# Patient Record
Sex: FEMALE | Race: WHITE | NOT HISPANIC OR LATINO | Employment: FULL TIME | URBAN - METROPOLITAN AREA
[De-identification: names, ages, dates, MRNs, and addresses within clinical notes are randomized per-mention and may not be internally consistent; named-entity substitution may affect disease eponyms.]

---

## 2017-11-06 ENCOUNTER — LAB VISIT (OUTPATIENT)
Dept: LAB | Facility: HOSPITAL | Age: 27
End: 2017-11-06
Attending: FAMILY MEDICINE
Payer: COMMERCIAL

## 2017-11-06 ENCOUNTER — OFFICE VISIT (OUTPATIENT)
Dept: FAMILY MEDICINE | Facility: CLINIC | Age: 27
End: 2017-11-06
Attending: FAMILY MEDICINE
Payer: COMMERCIAL

## 2017-11-06 ENCOUNTER — PATIENT MESSAGE (OUTPATIENT)
Dept: FAMILY MEDICINE | Facility: CLINIC | Age: 27
End: 2017-11-06

## 2017-11-06 VITALS
DIASTOLIC BLOOD PRESSURE: 70 MMHG | OXYGEN SATURATION: 98 % | SYSTOLIC BLOOD PRESSURE: 100 MMHG | BODY MASS INDEX: 25.07 KG/M2 | WEIGHT: 146.88 LBS | HEIGHT: 64 IN | HEART RATE: 67 BPM

## 2017-11-06 DIAGNOSIS — M22.2X1 PATELLOFEMORAL PAIN SYNDROME OF BOTH KNEES: ICD-10-CM

## 2017-11-06 DIAGNOSIS — Z00.00 ROUTINE GENERAL MEDICAL EXAMINATION AT A HEALTH CARE FACILITY: Primary | ICD-10-CM

## 2017-11-06 DIAGNOSIS — Z13.29 THYROID DISORDER SCREENING: ICD-10-CM

## 2017-11-06 DIAGNOSIS — M21.42 BILATERAL PES PLANUS: ICD-10-CM

## 2017-11-06 DIAGNOSIS — M21.069 ACQUIRED GENU VALGUM, UNSPECIFIED LATERALITY: ICD-10-CM

## 2017-11-06 DIAGNOSIS — M22.2X2 PATELLOFEMORAL PAIN SYNDROME OF BOTH KNEES: ICD-10-CM

## 2017-11-06 DIAGNOSIS — M21.41 BILATERAL PES PLANUS: ICD-10-CM

## 2017-11-06 PROCEDURE — 84439 ASSAY OF FREE THYROXINE: CPT

## 2017-11-06 PROCEDURE — 99385 PREV VISIT NEW AGE 18-39: CPT | Mod: S$GLB,,, | Performed by: FAMILY MEDICINE

## 2017-11-06 PROCEDURE — 84443 ASSAY THYROID STIM HORMONE: CPT

## 2017-11-06 PROCEDURE — 36415 COLL VENOUS BLD VENIPUNCTURE: CPT | Mod: PO

## 2017-11-06 PROCEDURE — 99999 PR PBB SHADOW E&M-NEW PATIENT-LVL III: CPT | Mod: PBBFAC,,, | Performed by: FAMILY MEDICINE

## 2017-11-06 NOTE — PROGRESS NOTES
Subjective:       Patient ID: Safia Morales is a 27 y.o. female.    Chief Complaint: Annual Exam    The patient presents to the office today requesting a routine periodic health examination.  This is her first visit with me.    Knee Pain    There was no injury mechanism. The pain is present in the right knee and left knee. The quality of the pain is described as aching. The pain is at a severity of 2/10. The pain is mild. The pain has been fluctuating since onset. Pertinent negatives include no inability to bear weight, loss of motion, loss of sensation, muscle weakness, numbness or tingling. The symptoms are aggravated by weight bearing and movement. She has tried acetaminophen, heat and NSAIDs for the symptoms. The treatment provided mild relief.      There is no problem list on file for this patient.      Past Surgical History:   Procedure Laterality Date    STRABISMUS SURGERY      x 2    WISDOM TOOTH EXTRACTION  2005       No current outpatient prescriptions on file.    Review of patient's allergies indicates:  No Known Allergies    Family History   Problem Relation Age of Onset    Diabetes Paternal Uncle     Cancer Paternal Uncle     Diabetes Maternal Grandfather     No Known Problems Mother     No Known Problems Father     No Known Problems Brother        Social History     Social History    Marital status: Single     Spouse name: N/A    Number of children: 0    Years of education: N/A     Occupational History          Social History Main Topics    Smoking status: Never Smoker    Smokeless tobacco: Never Used    Alcohol use 0.6 oz/week     1 Glasses of wine per week    Drug use: No    Sexual activity: No     Other Topics Concern    Not on file     Social History Narrative    The patient does exercise regularly (rides bike to work ~6mi/day).      She is not satisfied with weight.    Rates diet as fair to poor.    She does drink at least 1/2 gallon water daily.    She drinks 0-1  coffee/tea/caffeine-containing soft drinks daily.    Total sleep time at night is 8-9 hours.    She works 40 hours per week.    She does wear seat belts.    Hobbies include sewing, embroidery, reading           OB History      Para Term  AB Living    0 0 0 0 0 0    SAB TAB Ectopic Multiple Live Births    0 0 0 0 0        Obstetric Comments    Menarche age 10.   Menses normal and regular.  History of abnormal PAP smear: NO.  History of sexually transmitted disease:  NO            Patient Care Team:  Yonis Hall Jr., MD as PCP - General (Family Medicine)    Review of Systems   Constitutional: Negative for fatigue and unexpected weight change.   HENT: Negative for ear discharge, ear pain, hearing loss, tinnitus and voice change.    Respiratory: Negative for cough and shortness of breath.    Cardiovascular: Negative for chest pain, palpitations and leg swelling.   Gastrointestinal: Negative for abdominal pain, blood in stool, constipation, diarrhea, nausea and vomiting.   Genitourinary: Negative for difficulty urinating, dyspareunia, dysuria, frequency and hematuria.   Musculoskeletal: Negative for arthralgias, back pain and myalgias.   Skin: Negative for rash.   Neurological: Negative for dizziness, tingling, weakness, light-headedness, numbness and headaches.   Hematological: Does not bruise/bleed easily.   Psychiatric/Behavioral: Negative for dysphoric mood and sleep disturbance. The patient is not nervous/anxious.        Objective:      Physical Exam   Constitutional: She is oriented to person, place, and time. She appears well-developed and well-nourished. She is cooperative.   HENT:   Head: Normocephalic and atraumatic.   Nose: Nose normal.   Mouth/Throat: Oropharynx is clear and moist and mucous membranes are normal.   Eyes: Conjunctivae are normal. No scleral icterus.   Neck: Neck supple. No JVD present. Carotid bruit is not present. No thyromegaly present.   Cardiovascular: Normal rate, regular  "rhythm, normal heart sounds and normal pulses.  Exam reveals no gallop and no friction rub.    No murmur heard.  Pulmonary/Chest: Effort normal and breath sounds normal. She has no wheezes. She has no rhonchi. She has no rales.   Abdominal: Soft. Bowel sounds are normal. She exhibits no distension and no mass. There is no splenomegaly or hepatomegaly. There is no tenderness.   Musculoskeletal: Normal range of motion. She exhibits no edema or tenderness.        Right knee: She exhibits abnormal alignment. She exhibits normal range of motion. No tenderness found.        Left knee: She exhibits abnormal alignment. She exhibits normal range of motion. No tenderness found.   Mild genu valgus bilaterally.  Mild pes planus bilaterally. Crepitus w/ROM bilaterally.   Lymphadenopathy:     She has no cervical adenopathy.     She has no axillary adenopathy.   Neurological: She is alert and oriented to person, place, and time. She has normal strength and normal reflexes. No cranial nerve deficit or sensory deficit.   Skin: Skin is warm and dry.   Psychiatric: She has a normal mood and affect. Her speech is normal.   Vitals reviewed.      Assessment:       1. Routine general medical examination at a health care facility    2. Patellofemoral pain syndrome of both knees    3. Bilateral pes planus    4. Acquired genu valgum, unspecified laterality    5. Thyroid disorder screening        Plan:       Thyroid screening today.  Discussed proper footwear; recommended motion control shoes with possible prescription orthotics.  RICE.  Use compression sleeve or brace.  Patient's uncle as physical therapist; she will discuss her care with him for recommended exercises.  Also provided patient with exercise regimen.  OTC NSAIDs as recommended.    We will call the patient with results & make further recommendations at that time.  Follow-up in 2-3 weeks.      "This note will not be shared with the patient."  "

## 2017-11-06 NOTE — PATIENT INSTRUCTIONS
Understanding Patellofemoral Syndrome    Patellofemoral syndrome is a condition that causes pain on the front of the knee. The large bones of the upper and lower leg meet at the knee. This joint also includes a small triangle-shaped bone that rests on top of the leg bones. This is the kneecap (patella). Patellofemoral refers to the patella and the thigh bone (femur). These bones are surrounded by connective tissue and muscles. Patellofemoral pain is believed to come from stress on the tissues of and around the knee.  What causes patellofemoral syndrome?  No single cause for patellofemoral pain has been found. But many things are likely to contribute to this type of knee pain. These include:  · Actions that put repeated stress on the knee, such as running and squatting  · Overtraining at a sport  · Weak hip or thigh muscle  · Normal variations in the way body parts fit together  · Poor form during activities that stress the knee, such as running  · A fall or blow to the knee  Symptoms of patellofemoral syndrome  Pain is a common symptom. Its often on the front of the knee, but can be around the kneecap. Pain can occur at certain times, such as when you are:  · Running  · Sitting for a long time with your knees bent, such as at a movie  · Walking up or down stairs  · Squatting  Other symptoms may include:  · A feeling of the knee catching or locking  · A grinding or crackling noise in your knee  Treatment for patellofemoral syndrome  Treatment focuses on reducing pain and avoiding further injury. Treatments may include:  · Rest your leg. This gives your knee time to recover. You may need to avoid or change the activity that caused the problem, such as not running for a while.  · Prescription or over-the-counter pain medicines. These help reduce inflammation, swelling, and pain.  · Cold packs. These help reduce pain.  · Stretches and other exercises. These can improve balance, flexibility, and strength.  · A shoe  insert (orthotic). This can make your knee more stable.  · Elastic tape or a brace. These can make your knee more stable.  · Physical therapy. This may include exercises or other treatments.  · Surgery. In rare cases, if other treatments dont relieve symptoms, you may need surgery.  Possible complications of patellofemoral syndrome  If you dont give your knee time to heal, symptoms may return or get worse. Follow your healthcare providers instructions on resting and treating your knee.  When to call your healthcare provider  Call your healthcare provider right away if you have any of these:  · Fever of 100.4°F (38°C) or higher, or as directed  · Pain that gets worse  · Symptoms that dont get better, or get worse  · New symptoms   Date Last Reviewed: 3/10/2016  © 8302-3862 [employer]. [address] [place] [address], [place] [address]. All rights reserved. This information is not intended as a substitute for professional medical care. Always follow your healthcare professional's instructions.

## 2017-11-07 ENCOUNTER — PATIENT MESSAGE (OUTPATIENT)
Dept: FAMILY MEDICINE | Facility: CLINIC | Age: 27
End: 2017-11-07

## 2017-11-07 LAB
T4 FREE SERPL-MCNC: 0.85 NG/DL
TSH SERPL DL<=0.005 MIU/L-ACNC: 0.83 UIU/ML

## 2018-01-11 NOTE — TELEPHONE ENCOUNTER
Letter with recommendations from Dr. Hall as mailed to the patient's home. Patient has not yet responded to message she received from Dr. Hall via the patient portal. thanks

## 2018-08-22 ENCOUNTER — PATIENT OUTREACH (OUTPATIENT)
Dept: ADMINISTRATIVE | Facility: HOSPITAL | Age: 28
End: 2018-08-22

## 2018-08-23 ENCOUNTER — OFFICE VISIT (OUTPATIENT)
Dept: FAMILY MEDICINE | Facility: CLINIC | Age: 28
End: 2018-08-23
Attending: FAMILY MEDICINE
Payer: COMMERCIAL

## 2018-08-23 VITALS
BODY MASS INDEX: 26.01 KG/M2 | DIASTOLIC BLOOD PRESSURE: 80 MMHG | SYSTOLIC BLOOD PRESSURE: 120 MMHG | HEIGHT: 63 IN | HEART RATE: 75 BPM | WEIGHT: 146.81 LBS | OXYGEN SATURATION: 98 %

## 2018-08-23 DIAGNOSIS — Z00.00 ROUTINE GENERAL MEDICAL EXAMINATION AT A HEALTH CARE FACILITY: Primary | ICD-10-CM

## 2018-08-23 DIAGNOSIS — Z00.00 LABORATORY EXAM ORDERED AS PART OF ROUTINE GENERAL MEDICAL EXAMINATION: ICD-10-CM

## 2018-08-23 DIAGNOSIS — E01.0 THYROMEGALY: ICD-10-CM

## 2018-08-23 DIAGNOSIS — H53.9 VISUAL DISTURBANCE: ICD-10-CM

## 2018-08-23 PROCEDURE — 3008F BODY MASS INDEX DOCD: CPT | Mod: CPTII,S$GLB,, | Performed by: FAMILY MEDICINE

## 2018-08-23 PROCEDURE — 99999 PR PBB SHADOW E&M-EST. PATIENT-LVL III: CPT | Mod: PBBFAC,,, | Performed by: FAMILY MEDICINE

## 2018-08-23 PROCEDURE — 99214 OFFICE O/P EST MOD 30 MIN: CPT | Mod: S$GLB,,, | Performed by: FAMILY MEDICINE

## 2018-08-23 NOTE — PROGRESS NOTES
"Subjective:       Patient ID: Safia Morales is a 28 y.o. female.    Chief Complaint: Annual exam (Patient also states seeing black spots and sometime yellow stars.)    The patient presents to the office today requesting a routine periodic health examination.    Eye Problem    Both eyes are affected.This is a chronic problem. The current episode started more than 1 month ago. The problem occurs 2 to 4 times per day. The problem has been gradually worsening. There was no injury mechanism. The pain is at a severity of 0/10. The patient is experiencing no pain. There is no known exposure to pink eye. She wears contacts. Associated symptoms include double vision. Pertinent negatives include no blurred vision, eye discharge, eye redness, foreign body sensation, itching, photophobia, vomiting or weakness. She has tried eye drops for the symptoms. The treatment provided no relief.   She states she see "black spots and sometime yellow stars" in her field of vision.  She wears glasses due to diplopia.     Patient Active Problem List   Diagnosis    Patellofemoral pain syndrome of both knees       Past Surgical History:   Procedure Laterality Date    STRABISMUS SURGERY      x 2    WISDOM TOOTH EXTRACTION  2005       No current outpatient medications on file.    Review of patient's allergies indicates:  No Known Allergies    Family History   Problem Relation Age of Onset    Diabetes Paternal Uncle     Cancer Paternal Uncle     Diabetes Maternal Grandfather     No Known Problems Mother     No Known Problems Father     No Known Problems Brother        Social History     Socioeconomic History    Marital status: Single     Spouse name: Not on file    Number of children: 0    Years of education: Not on file    Highest education level: Not on file   Social Needs    Financial resource strain: Not on file    Food insecurity - worry: Not on file    Food insecurity - inability: Not on file    Transportation needs - " medical: Not on file    Transportation needs - non-medical: Not on file   Occupational History    Occupation:    Tobacco Use    Smoking status: Never Smoker    Smokeless tobacco: Never Used   Substance and Sexual Activity    Alcohol use: Yes     Alcohol/week: 0.6 oz     Types: 1 Glasses of wine per week    Drug use: No    Sexual activity: No     Partners: Male   Other Topics Concern    Not on file   Social History Narrative    The patient does exercise regularly (rides bike to work ~6mi/day).      She is not satisfied with weight.    Rates diet as fair to poor.    She does drink at least 1/2 gallon water daily.    She drinks 0-1 coffee/tea/caffeine-containing soft drinks daily.    Total sleep time at night is 8-9 hours.    She works 40 hours per week.    She does wear seat belts.    Hobbies include sewing, embroidery, reading       OB History      Para Term  AB Living    0 0 0 0 0 0    SAB TAB Ectopic Multiple Live Births    0 0 0 0 0        Obstetric Comments    Menarche age 10.   Menses normal and regular.  History of abnormal PAP smear: NO.  History of sexually transmitted disease:  NO            Patient Care Team:  Yonis Hall Jr., MD as PCP - General (Family Medicine)  Trinity Mead LPN as Care Coordinator  Luis Martin MD as Consulting Physician (Ophthalmology)      Review of Systems   Constitutional: Negative for activity change and unexpected weight change.   HENT: Negative for hearing loss, rhinorrhea and trouble swallowing.    Eyes: Positive for double vision and visual disturbance. Negative for blurred vision, photophobia, discharge, redness and itching.   Respiratory: Negative for chest tightness and wheezing.    Cardiovascular: Negative for chest pain and palpitations.   Gastrointestinal: Negative for blood in stool, constipation, diarrhea and vomiting.   Endocrine: Negative for polydipsia, polyphagia and polyuria.   Genitourinary: Negative for difficulty  "urinating, dysuria, hematuria and menstrual problem.   Musculoskeletal: Negative for arthralgias, joint swelling and neck pain.   Neurological: Negative for dizziness, syncope, weakness, light-headedness and headaches.   Psychiatric/Behavioral: Negative for confusion, dysphoric mood and sleep disturbance.       Objective:       /80 (BP Location: Left arm, Patient Position: Sitting, BP Method: Small (Manual))   Pulse 75   Ht 5' 3" (1.6 m)   Wt 66.6 kg (146 lb 12.8 oz)   SpO2 98%   BMI 26.00 kg/m²     Physical Exam   Constitutional: She is oriented to person, place, and time. She appears well-developed and well-nourished. She is cooperative.   HENT:   Head: Normocephalic and atraumatic.   Nose: Nose normal.   Mouth/Throat: Oropharynx is clear and moist and mucous membranes are normal.   Eyes: Conjunctivae and EOM are normal. Pupils are equal, round, and reactive to light. Right conjunctiva is not injected. Left conjunctiva is not injected. No scleral icterus.   Fundoscopic exam:       The right eye shows no hemorrhage and no papilledema.        The left eye shows no hemorrhage and no papilledema.   Neck: Neck supple. No JVD present. Carotid bruit is not present. No thyromegaly present.   Cardiovascular: Normal rate, regular rhythm, normal heart sounds and normal pulses. Exam reveals no gallop and no friction rub.   No murmur heard.  Pulmonary/Chest: Effort normal and breath sounds normal. She has no wheezes. She has no rhonchi. She has no rales.   Abdominal: Soft. Bowel sounds are normal. She exhibits no distension and no mass. There is no splenomegaly or hepatomegaly. There is no tenderness.   Musculoskeletal: Normal range of motion. She exhibits no edema or tenderness.   Lymphadenopathy:     She has no cervical adenopathy.     She has no axillary adenopathy.   Neurological: She is alert and oriented to person, place, and time. She has normal strength and normal reflexes. No cranial nerve deficit or " "sensory deficit.   Skin: Skin is warm and dry.   Psychiatric: She has a normal mood and affect. Her speech is normal.   Vitals reviewed.        Assessment:       1. Routine general medical examination at a health care facility    2. Visual disturbance    3. Thyromegaly    4. Laboratory exam ordered as part of routine general medical examination          Plan:       Recommend followup with Dr. Martin ("floaters?").    Orders Placed This Encounter    US Soft Tissue Head Neck Thyroid    CBC auto differential    Comprehensive metabolic panel    Lipid panel    TSH     We will call the patient with results & make further recommendations at that time.      "This note will not be shared with the patient."  "

## 2018-08-24 ENCOUNTER — HOSPITAL ENCOUNTER (OUTPATIENT)
Dept: RADIOLOGY | Facility: HOSPITAL | Age: 28
Discharge: HOME | End: 2018-08-24
Attending: FAMILY MEDICINE
Payer: COMMERCIAL

## 2018-08-24 DIAGNOSIS — E01.0 THYROMEGALY: ICD-10-CM

## 2018-08-24 PROCEDURE — 76536 US EXAM OF HEAD AND NECK: CPT | Mod: TC

## 2018-08-24 PROCEDURE — 76536 US EXAM OF HEAD AND NECK: CPT | Mod: 26,,, | Performed by: RADIOLOGY

## 2018-08-26 ENCOUNTER — PATIENT MESSAGE (OUTPATIENT)
Dept: FAMILY MEDICINE | Facility: CLINIC | Age: 28
End: 2018-08-26

## 2018-08-27 ENCOUNTER — PATIENT MESSAGE (OUTPATIENT)
Dept: FAMILY MEDICINE | Facility: CLINIC | Age: 28
End: 2018-08-27

## 2018-08-27 ENCOUNTER — LAB VISIT (OUTPATIENT)
Dept: LAB | Facility: HOSPITAL | Age: 28
End: 2018-08-27
Attending: FAMILY MEDICINE
Payer: COMMERCIAL

## 2018-08-27 DIAGNOSIS — Z00.00 LABORATORY EXAM ORDERED AS PART OF ROUTINE GENERAL MEDICAL EXAMINATION: ICD-10-CM

## 2018-08-27 DIAGNOSIS — E01.0 THYROMEGALY: ICD-10-CM

## 2018-08-27 LAB
ALBUMIN SERPL BCP-MCNC: 4 G/DL
ALP SERPL-CCNC: 75 U/L
ALT SERPL W/O P-5'-P-CCNC: 13 U/L
ANION GAP SERPL CALC-SCNC: 8 MMOL/L
AST SERPL-CCNC: 15 U/L
BASOPHILS # BLD AUTO: 0.05 K/UL
BASOPHILS NFR BLD: 0.6 %
BILIRUB SERPL-MCNC: 0.7 MG/DL
BUN SERPL-MCNC: 10 MG/DL
CALCIUM SERPL-MCNC: 9.4 MG/DL
CHLORIDE SERPL-SCNC: 106 MMOL/L
CHOLEST SERPL-MCNC: 200 MG/DL
CHOLEST/HDLC SERPL: 4.5 {RATIO}
CO2 SERPL-SCNC: 26 MMOL/L
CREAT SERPL-MCNC: 1 MG/DL
DIFFERENTIAL METHOD: ABNORMAL
EOSINOPHIL # BLD AUTO: 0.8 K/UL
EOSINOPHIL NFR BLD: 10.3 %
ERYTHROCYTE [DISTWIDTH] IN BLOOD BY AUTOMATED COUNT: 12.2 %
EST. GFR  (AFRICAN AMERICAN): >60 ML/MIN/1.73 M^2
EST. GFR  (NON AFRICAN AMERICAN): >60 ML/MIN/1.73 M^2
GLUCOSE SERPL-MCNC: 91 MG/DL
HCT VFR BLD AUTO: 42.1 %
HDLC SERPL-MCNC: 44 MG/DL
HDLC SERPL: 22 %
HGB BLD-MCNC: 14.3 G/DL
IMM GRANULOCYTES # BLD AUTO: 0.04 K/UL
IMM GRANULOCYTES NFR BLD AUTO: 0.5 %
LDLC SERPL CALC-MCNC: 132.4 MG/DL
LYMPHOCYTES # BLD AUTO: 2.8 K/UL
LYMPHOCYTES NFR BLD: 36.2 %
MCH RBC QN AUTO: 31.2 PG
MCHC RBC AUTO-ENTMCNC: 34 G/DL
MCV RBC AUTO: 92 FL
MONOCYTES # BLD AUTO: 0.8 K/UL
MONOCYTES NFR BLD: 10.4 %
NEUTROPHILS # BLD AUTO: 3.3 K/UL
NEUTROPHILS NFR BLD: 42 %
NONHDLC SERPL-MCNC: 156 MG/DL
NRBC BLD-RTO: 0 /100 WBC
PLATELET # BLD AUTO: 289 K/UL
PMV BLD AUTO: 10.7 FL
POTASSIUM SERPL-SCNC: 4 MMOL/L
PROT SERPL-MCNC: 7.3 G/DL
RBC # BLD AUTO: 4.58 M/UL
SODIUM SERPL-SCNC: 140 MMOL/L
TRIGL SERPL-MCNC: 118 MG/DL
TSH SERPL DL<=0.005 MIU/L-ACNC: 3.13 UIU/ML
WBC # BLD AUTO: 7.77 K/UL

## 2018-08-27 PROCEDURE — 80061 LIPID PANEL: CPT

## 2018-08-27 PROCEDURE — 36415 COLL VENOUS BLD VENIPUNCTURE: CPT

## 2018-08-27 PROCEDURE — 80053 COMPREHEN METABOLIC PANEL: CPT

## 2018-08-27 PROCEDURE — 85025 COMPLETE CBC W/AUTO DIFF WBC: CPT

## 2018-08-27 PROCEDURE — 84443 ASSAY THYROID STIM HORMONE: CPT

## 2018-08-28 ENCOUNTER — PATIENT MESSAGE (OUTPATIENT)
Dept: FAMILY MEDICINE | Facility: CLINIC | Age: 28
End: 2018-08-28

## 2018-09-05 ENCOUNTER — PATIENT MESSAGE (OUTPATIENT)
Dept: FAMILY MEDICINE | Facility: CLINIC | Age: 28
End: 2018-09-05

## 2018-09-06 ENCOUNTER — PATIENT MESSAGE (OUTPATIENT)
Dept: FAMILY MEDICINE | Facility: CLINIC | Age: 28
End: 2018-09-06

## 2018-09-11 ENCOUNTER — PATIENT MESSAGE (OUTPATIENT)
Dept: FAMILY MEDICINE | Facility: CLINIC | Age: 28
End: 2018-09-11

## 2018-09-26 ENCOUNTER — PATIENT MESSAGE (OUTPATIENT)
Dept: FAMILY MEDICINE | Facility: CLINIC | Age: 28
End: 2018-09-26

## 2018-10-04 ENCOUNTER — PATIENT MESSAGE (OUTPATIENT)
Dept: FAMILY MEDICINE | Facility: CLINIC | Age: 28
End: 2018-10-04

## 2018-10-05 ENCOUNTER — OFFICE VISIT (OUTPATIENT)
Dept: FAMILY MEDICINE | Facility: CLINIC | Age: 28
End: 2018-10-05
Attending: FAMILY MEDICINE
Payer: COMMERCIAL

## 2018-10-05 VITALS
DIASTOLIC BLOOD PRESSURE: 76 MMHG | BODY MASS INDEX: 25.48 KG/M2 | OXYGEN SATURATION: 94 % | HEIGHT: 63 IN | WEIGHT: 143.81 LBS | SYSTOLIC BLOOD PRESSURE: 112 MMHG | HEART RATE: 105 BPM

## 2018-10-05 DIAGNOSIS — D17.1 LIPOMA OF TORSO: Primary | ICD-10-CM

## 2018-10-05 DIAGNOSIS — M79.671 FOOT PAIN, RIGHT: ICD-10-CM

## 2018-10-05 PROCEDURE — 99214 OFFICE O/P EST MOD 30 MIN: CPT | Mod: S$GLB,,, | Performed by: FAMILY MEDICINE

## 2018-10-05 PROCEDURE — 73630 X-RAY EXAM OF FOOT: CPT | Mod: RT,S$GLB,, | Performed by: RADIOLOGY

## 2018-10-05 PROCEDURE — 99999 PR PBB SHADOW E&M-EST. PATIENT-LVL IV: CPT | Mod: PBBFAC,,, | Performed by: FAMILY MEDICINE

## 2018-10-05 PROCEDURE — 3008F BODY MASS INDEX DOCD: CPT | Mod: CPTII,S$GLB,, | Performed by: FAMILY MEDICINE

## 2018-10-05 RX ORDER — NAPROXEN 500 MG/1
500 TABLET ORAL 2 TIMES DAILY WITH MEALS
Qty: 60 TABLET | Refills: 0 | Status: SHIPPED | OUTPATIENT
Start: 2018-10-05 | End: 2018-11-13

## 2018-10-05 NOTE — MEDICAL/APP STUDENT
Subjective:       Patient ID: Safia Morales is a 28 y.o. female.    Chief Complaint: Mass (back and right foot)    Patient presents today for swelling on her foot and back.  In July, during an obstacle course event she jumped from about 8-10 ft and landed very hard on her right foot. The pain has been intermittent but over the past month has been increasing in frequency to daily. A week before last Monday the pain had increased to the point where she could not weight bear. Pain is mostly only present in the morning when she takes her first steps after getting up. She describes it as a shooting pain up her leg. 1 week ago she used ice to try to relieve the pain and noticed swelling in the area after icing. She has tried ibuprofen, aleve, and extra strength tylenol intermittently with relief. It is worse with her shoes on so she has been often walking around in just socks and an ankle brace. She also complains of tingling. She went to urgent care this past Sunday and they thought fracture was unlikely and recommended follow up with PCP.    She is also complaining of a mass over her right scapula since yesterday. She noticed it in her shower. No trauma to the area or insect bites. Denies pain or tenderness.      Review of Systems   Constitutional: Negative for chills, fatigue and fever.   HENT: Negative for congestion, rhinorrhea and sore throat.    Eyes: Negative for discharge and visual disturbance.   Respiratory: Negative for cough and shortness of breath.    Cardiovascular: Negative for chest pain and palpitations.   Gastrointestinal: Negative for abdominal pain, constipation, diarrhea, nausea and vomiting.   Genitourinary: Negative for dysuria and frequency.   Musculoskeletal: Negative for arthralgias and myalgias.   Skin: Negative for rash.   Neurological: Negative for dizziness and headaches.       No current outpatient medications on file prior to visit.     No current facility-administered medications on  "file prior to visit.      History reviewed. No pertinent past medical history.  Past Surgical History:   Procedure Laterality Date    STRABISMUS SURGERY      x 2    WISDOM TOOTH EXTRACTION  2005     Social History     Socioeconomic History    Marital status: Single     Spouse name: Not on file    Number of children: 0    Years of education: Not on file    Highest education level: Not on file   Social Needs    Financial resource strain: Not on file    Food insecurity - worry: Not on file    Food insecurity - inability: Not on file    Transportation needs - medical: Not on file    Transportation needs - non-medical: Not on file   Occupational History    Occupation:    Tobacco Use    Smoking status: Never Smoker    Smokeless tobacco: Never Used   Substance and Sexual Activity    Alcohol use: Yes     Alcohol/week: 0.6 oz     Types: 1 Glasses of wine per week    Drug use: No    Sexual activity: No     Partners: Male   Other Topics Concern    Not on file   Social History Narrative    The patient does exercise regularly (rides bike to work ~6mi/day).      She is not satisfied with weight.    Rates diet as fair to poor.    She does drink at least 1/2 gallon water daily.    She drinks 0-1 coffee/tea/caffeine-containing soft drinks daily.    Total sleep time at night is 8-9 hours.    She works 40 hours per week.    She does wear seat belts.    Hobbies include sewing, embroidery, reading       Objective:       Vitals:    10/05/18 1029   BP: 112/76   Pulse: 105   SpO2: (!) 94%   Weight: 65.2 kg (143 lb 12.8 oz)   Height: 5' 3" (1.6 m)       Physical Exam   Constitutional: She is oriented to person, place, and time. She appears well-developed and well-nourished. No distress.   HENT:   Head: Normocephalic and atraumatic.   Mouth/Throat: Oropharynx is clear and moist. No oropharyngeal exudate.   Eyes: Conjunctivae are normal. Pupils are equal, round, and reactive to light. Right eye exhibits no " discharge. Left eye exhibits no discharge. No scleral icterus.   Neck: No thyromegaly present.   Cardiovascular: Normal rate, regular rhythm, normal heart sounds and intact distal pulses.   No murmur heard.  Pulmonary/Chest: Effort normal and breath sounds normal. No respiratory distress. She has no wheezes.   Abdominal: Soft. Bowel sounds are normal. She exhibits no distension and no mass. There is no tenderness.   Musculoskeletal: She exhibits no edema or deformity.        Right foot: There is normal range of motion, no tenderness, no bony tenderness, no swelling and no deformity.        Feet:    Lymphadenopathy:     She has no cervical adenopathy.   Neurological: She is alert and oriented to person, place, and time.   Skin: Skin is warm and dry. Capillary refill takes less than 2 seconds. She is not diaphoretic. No erythema. No pallor.        Psychiatric: She has a normal mood and affect. Her behavior is normal.       Assessment:       1. Lipoma of torso    2. Foot pain, right        Plan:       Right foot XR ordered  Start Naprosyn 500 mg BID  Referral to Gen Surgery for lipoma  Patient to see podiatry on 10/10

## 2018-10-07 ENCOUNTER — PATIENT MESSAGE (OUTPATIENT)
Dept: FAMILY MEDICINE | Facility: CLINIC | Age: 28
End: 2018-10-07

## 2018-10-10 ENCOUNTER — OFFICE VISIT (OUTPATIENT)
Dept: PODIATRY | Facility: CLINIC | Age: 28
End: 2018-10-10
Payer: COMMERCIAL

## 2018-10-10 VITALS
HEIGHT: 64 IN | HEART RATE: 72 BPM | DIASTOLIC BLOOD PRESSURE: 80 MMHG | BODY MASS INDEX: 24.46 KG/M2 | WEIGHT: 143.31 LBS | SYSTOLIC BLOOD PRESSURE: 120 MMHG

## 2018-10-10 DIAGNOSIS — M79.671 RIGHT FOOT PAIN: Primary | ICD-10-CM

## 2018-10-10 DIAGNOSIS — G57.51 TARSAL TUNNEL SYNDROME OF RIGHT SIDE: ICD-10-CM

## 2018-10-10 PROCEDURE — 99999 PR PBB SHADOW E&M-EST. PATIENT-LVL III: CPT | Mod: PBBFAC,,, | Performed by: PODIATRIST

## 2018-10-10 PROCEDURE — 99203 OFFICE O/P NEW LOW 30 MIN: CPT | Mod: S$GLB,,, | Performed by: PODIATRIST

## 2018-10-10 PROCEDURE — 3008F BODY MASS INDEX DOCD: CPT | Mod: CPTII,S$GLB,, | Performed by: PODIATRIST

## 2018-10-10 RX ORDER — METHYLPREDNISOLONE 4 MG/1
TABLET ORAL
Qty: 1 PACKAGE | Refills: 0 | Status: SHIPPED | OUTPATIENT
Start: 2018-10-10 | End: 2018-11-13

## 2018-10-10 NOTE — PROGRESS NOTES
Subjective:      Patient ID: Safia Morales is a 28 y.o. female.    Chief Complaint: Foot Pain    Intermittent pain, tingling, and feeling like spiders in arch right.  Gradual onset, not over past several weeks since a cycling race, aggravated by increased weight bearing, shoe gear, pressure.  No previous medical treatment.  OTC pain med not helping.   Denies trauma, surgery.  nsaid helps some.  xrays normal 10/1/18  Review of Systems   Constitution: Negative for chills, diaphoresis, fever, malaise/fatigue and night sweats.   Cardiovascular: Negative for claudication, cyanosis, leg swelling and syncope.   Skin: Negative for color change, dry skin, nail changes, rash, suspicious lesions and unusual hair distribution.   Musculoskeletal: Negative for falls, joint pain, joint swelling, muscle cramps, muscle weakness and stiffness.   Gastrointestinal: Negative for constipation, diarrhea, nausea and vomiting.   Neurological: Positive for paresthesias. Negative for brief paralysis, disturbances in coordination, focal weakness, numbness, sensory change and tremors.           Objective:      Physical Exam   Constitutional: She is oriented to person, place, and time. She appears well-developed and well-nourished. She is cooperative. No distress.   Cardiovascular:   Pulses:       Popliteal pulses are 2+ on the right side, and 2+ on the left side.        Dorsalis pedis pulses are 2+ on the right side, and 2+ on the left side.        Posterior tibial pulses are 2+ on the right side, and 2+ on the left side.   Capillary refill 3 seconds all toes/distal feet, all toes/both feet warm to touch.      Negative lymphadenopathy bilateral popliteal fossa and tarsal tunnel.      Negavie lower extremity edema bilateral.     Musculoskeletal:        Right ankle: She exhibits normal range of motion, no swelling, no ecchymosis, no deformity, no laceration and normal pulse. Achilles tendon normal. Achilles tendon exhibits no pain, no defect  and normal Song's test results.   Ankle dorsiflexion decreased at <10 degrees bilateral with moderate increase with knee flexion bilateral.    Otherwise, Normal angle, base, station of gait. All ten toes without clubbing, cyanosis, or signs of ischemia.  No pain to palpation bilateral lower extremities.  Range of motion, stability, muscle strength, and muscle tone normal bilateral feet and legs.    Lymphadenopathy: No inguinal adenopathy noted on the right or left side.   Negative lymphadenopathy bilateral popliteal fossa and tarsal tunnel.    Negative lymphangitic streaking bilateral feet/ankles/legs.   Neurological: She is alert and oriented to person, place, and time. She has normal strength. She displays no atrophy and no tremor. No sensory deficit. She exhibits normal muscle tone. Gait normal.   Reflex Scores:       Patellar reflexes are 2+ on the right side and 2+ on the left side.       Achilles reflexes are 2+ on the right side and 2+ on the left side.  Negative tinel sign to percussion sural, superficial peroneal, deep peroneal, saphenous, and posterior tibial nerves right and left ankles and feet.     Skin: Skin is warm, dry and intact. Capillary refill takes 2 to 3 seconds. No abrasion, no bruising, no burn, no ecchymosis, no laceration, no lesion and no rash noted. She is not diaphoretic. No cyanosis or erythema. No pallor. Nails show no clubbing.     Skin is normal age and health appropriate color, turgor, texture, and temperature bilateral lower extremities without ulceration, hyperpigmentation, discoloration, masses nodules or cords palpated.  No ecchymosis, erythema, edema, or cardinal signs of infection bilateral lower extremities.     Psychiatric: She has a normal mood and affect.             Assessment:       Encounter Diagnoses   Name Primary?    Right foot pain Yes    Tarsal tunnel syndrome of right side          Plan:       Safia was seen today for foot pain.    Diagnoses and all orders  for this visit:    Right foot pain  -     Ambulatory consult to Physical Therapy  -     ORTHOTIC DEVICE (DME)    Tarsal tunnel syndrome of right side  -     Ambulatory consult to Physical Therapy  -     ORTHOTIC DEVICE (DME)    Other orders  -     methylPREDNISolone (MEDROL DOSEPACK) 4 mg tablet; use as directed      I counseled the patient on her conditions, their implications and medical management.        PT, custom orthotics, continue nsaid, Rx medrol dose pack.    Discussed conservative treatment with shoes of adequate dimensions, material, and style to alleviate symptoms and delay or prevent surgical intervention.    Fx boot, ambulate to tolerance weaning to shoes/inserts as symptoms allow.          Follow-up in about 6 weeks (around 11/21/2018).

## 2018-10-11 ENCOUNTER — OFFICE VISIT (OUTPATIENT)
Dept: SURGERY | Facility: CLINIC | Age: 28
End: 2018-10-11
Payer: COMMERCIAL

## 2018-10-11 VITALS
BODY MASS INDEX: 25.34 KG/M2 | HEART RATE: 77 BPM | WEIGHT: 143 LBS | DIASTOLIC BLOOD PRESSURE: 73 MMHG | HEIGHT: 63 IN | SYSTOLIC BLOOD PRESSURE: 121 MMHG

## 2018-10-11 PROCEDURE — 99999 PR PBB SHADOW E&M-EST. PATIENT-LVL III: CPT | Mod: PBBFAC,,, | Performed by: SURGERY

## 2018-10-11 PROCEDURE — 99203 OFFICE O/P NEW LOW 30 MIN: CPT | Mod: S$GLB,,, | Performed by: SURGERY

## 2018-10-11 PROCEDURE — 3008F BODY MASS INDEX DOCD: CPT | Mod: CPTII,S$GLB,, | Performed by: SURGERY

## 2018-10-11 NOTE — LETTER
October 11, 2018        Sana Colon MD  [address]  [address]  [facility] [address]             [facility] - General Surgery  [address]  [place] [address]  Phone: [phone]   Patient: Safia Morales   MR Number: [id number]   YOB: 1990   Date of Visit: 10/11/2018       Dear Dr. Colon:    Thank you for referring Safia Morales to me for evaluation. Attached you will find relevant portions of my assessment and plan of care.    ASSESSMENT/PLAN:     Likely lipoma    PLAN:    No need for treatment unless this grows.  She can follow up with her pcp.    If you have questions, please do not hesitate to call me. I look forward to following Safia Morales along with you.    Sincerely,      Abraham Coppola MD            CC  Yonis Hall Jr., MD    [facility]

## 2018-10-11 NOTE — Clinical Note
October 11, 2018      Sana Colon MD  [address]  [address]  [facility] [address]           [facility] - General Surgery  [address]  [place] [address]  Phone: [phone]          Patient: Safia Morales   MR Number: [id number]   YOB: 1990   Date of Visit: 10/11/2018       Dear Dr. Sana Colon:    Thank you for referring Safia Morales to me for evaluation. Attached you will find relevant portions of my assessment and plan of care.    If you have questions, please do not hesitate to call me. I look forward to following Safia Morales along with you.    Sincerely,    Abraham Coppola MD    Enclosure  CC:  No Recipients    If you would like to receive this communication electronically, please contact [email or web address] or [phone] to request more information on [facility] access.    For providers and/or their staff who would like to refer a patient to [facility], please contact us through our one-stop-shop provider referral line, [facility], at [phone].    If you feel you have received this communication in error or would no longer like to receive these types of communications, please e-mail [email or web address]

## 2018-10-11 NOTE — PROGRESS NOTES
Subjective:       Patient ID: Safia oMrales is a 28 y.o. female.    Chief Complaint: Mass and Foot Pain    HPI   Pt is here for c/o enlarging fatty area on her back shew oul dlike to have it removed no weight loss not sure how long it has been there  Pt has right foot pain several weeks generalized no acute event pain 4/10 takes nothing for this on a schedule   Review of Systems   Constitutional: Negative for activity change, chills, fatigue, fever and unexpected weight change.   HENT: Negative for hearing loss, rhinorrhea and trouble swallowing.    Respiratory: Negative for chest tightness, shortness of breath and wheezing.    Cardiovascular: Negative for chest pain and palpitations.   Gastrointestinal: Negative for blood in stool, constipation, diarrhea and vomiting.   Endocrine: Negative for polydipsia and polyuria.   Genitourinary: Negative for difficulty urinating, dysuria, hematuria and menstrual problem.   Musculoskeletal: Positive for arthralgias. Negative for joint swelling and neck pain.   Skin: Negative for color change, rash and wound.   Psychiatric/Behavioral: Negative for confusion and dysphoric mood.       Objective:      Physical Exam   Constitutional: She appears well-developed and well-nourished. No distress.   Cardiovascular: Normal rate and regular rhythm. Exam reveals no gallop.   Pulmonary/Chest: Effort normal and breath sounds normal. No respiratory distress. She has no wheezes.   Abdominal: Soft. Bowel sounds are normal. She exhibits no distension. There is no tenderness.   Musculoskeletal: Normal range of motion. She exhibits no tenderness or deformity.   Skin: Skin is warm and dry. No rash noted. No erythema. No pallor.   approx 4 cm prominence with arm elevation non tender no overlying skin changes        Assessment:       1. Lipoma of torso    2. Foot pain, right        Plan:        orders foot x-ray  Start medrol dose pack  nsaids on a schedule instead if desired  F/u gen surgery  rtc  "prn    "This note will not be shared with the patient."   "

## 2018-10-11 NOTE — PROGRESS NOTES
History & Physical    SUBJECTIVE:     History of Present Illness:  Patient is a 28 y.o. female presents with back mass.  The mass is on the lower right scapula and is only visible when she raises her hand.  She noticed it a week ago and it is asymptomatic.      Chief Complaint   Patient presents with    Lump     back/ shoulder       Review of patient's allergies indicates:   Allergen Reactions    Latex, natural rubber Rash       Current Outpatient Medications   Medication Sig Dispense Refill    methylPREDNISolone (MEDROL DOSEPACK) 4 mg tablet use as directed 1 Package 0    naproxen (NAPROSYN) 500 MG tablet Take 1 tablet (500 mg total) by mouth 2 (two) times daily with meals. prn 60 tablet 0     No current facility-administered medications for this visit.        History reviewed. No pertinent past medical history.  Past Surgical History:   Procedure Laterality Date    STRABISMUS SURGERY      x 2    WISDOM TOOTH EXTRACTION  2005     Family History   Problem Relation Age of Onset    Diabetes Paternal Uncle     Cancer Paternal Uncle     Diabetes Maternal Grandfather     No Known Problems Mother     No Known Problems Father     No Known Problems Brother      Social History     Tobacco Use    Smoking status: Never Smoker    Smokeless tobacco: Never Used   Substance Use Topics    Alcohol use: Yes     Alcohol/week: 0.6 oz     Types: 1 Glasses of wine per week    Drug use: No        Review of Systems:  Review of Systems   Constitutional: Negative for fever and unexpected weight change.   Respiratory: Negative for cough, chest tightness and shortness of breath.    Cardiovascular: Negative for chest pain and palpitations.   Gastrointestinal: Negative for abdominal pain, constipation, diarrhea, nausea and vomiting.   Genitourinary: Negative for difficulty urinating and dysuria.   Skin: Negative for rash and wound.   Neurological: Negative for seizures.        States she had vision changes of the left side of the  "left eye for an hour last week.  Was told this was a migraine.     Hematological: Does not bruise/bleed easily.       OBJECTIVE:     Vital Signs (Most Recent)  Pulse: 77 (10/11/18 0706)  BP: 121/73 (10/11/18 0706)  5' 3" (1.6 m)  64.9 kg (143 lb)     Physical Exam:  Physical Exam   Constitutional: She is oriented to person, place, and time. She appears well-developed and well-nourished.   Neck: Normal range of motion. Neck supple.   Cardiovascular: Normal rate, regular rhythm and normal heart sounds.   Pulmonary/Chest: Effort normal and breath sounds normal.   Abdominal: Soft. Bowel sounds are normal.   Neurological: She is alert and oriented to person, place, and time.   Skin: Skin is warm and dry.        Psychiatric: She has a normal mood and affect. Her behavior is normal. Judgment and thought content normal.   Vitals reviewed.      Laboratory  CBC: Reviewed  CMP: Reviewed    Diagnostic Results:  None applicable    ASSESSMENT/PLAN:     Likely lipoma    PLAN:    No need for treatment unless this grows.  She can follow up with her pcp.              "

## 2018-10-12 ENCOUNTER — TELEPHONE (OUTPATIENT)
Dept: PODIATRY | Facility: CLINIC | Age: 28
End: 2018-10-12

## 2018-10-12 NOTE — TELEPHONE ENCOUNTER
Pt stated she thinks she is running a temp, pt stated she did not take her temp, just had a co-worker feel her forehead. I advised her to take her temp and if she is running a temp to take OTC Tylenol or Ibuprofen. Pt agreed. Pt denied cold or sinus related symptoms.

## 2018-10-12 NOTE — TELEPHONE ENCOUNTER
Incoming call from phone room, states that she is running fever. Advised that she would need to talk to nurse, forward message to Hetal Tatum LPN

## 2018-10-17 ENCOUNTER — CLINICAL SUPPORT (OUTPATIENT)
Dept: REHABILITATION | Facility: HOSPITAL | Age: 28
End: 2018-10-17
Attending: PODIATRIST
Payer: COMMERCIAL

## 2018-10-17 DIAGNOSIS — M79.671 RIGHT FOOT PAIN: ICD-10-CM

## 2018-10-17 DIAGNOSIS — M25.671 STIFFNESS OF ANKLE JOINT, RIGHT: ICD-10-CM

## 2018-10-17 PROCEDURE — 97110 THERAPEUTIC EXERCISES: CPT

## 2018-10-17 PROCEDURE — 97161 PT EVAL LOW COMPLEX 20 MIN: CPT

## 2018-10-17 NOTE — PLAN OF CARE
"[facility]  Physical Therapy Initial Evaluation    Name: Safia Morales  Clinic Number: [id number]    Therapy Diagnosis:   Encounter Diagnosis   Name Primary?    Right foot pain      Physician: Vamsi Estevez DPM    Physician Orders: PT Eval and Treat  Medical Diagnosis:   M79.671 (ICD-10-CM) - Right foot pain   G57.51 (ICD-10-CM) - Tarsal tunnel syndrome of right side   Evaluation Date: 10/17/2018  Authorization Period Expiration: 12-  Plan of Care Certification Period: 12-  Visit # / Visits authorized: 1/20  Date of Surgery: N/A  Precautions: Standard    Time In: 4:00 PM  Time Out: 5:18 PM  Total Billable Time: 60 minutes    Subjective     Date of onset:  07-  History of current condition - Safia reports: that she first injured her R ankle during an [employer] competition when jumping from one platform to another and missed the landing. She stated that she had a shooting pain going up her R leg immediately after hitting the ground but the pain almost immediately subsided and went away for awhile. She stated that around July 28th - 29th she stepped on a receipt to stop it from flying away and she felt the same shooting pain up her leg. After this she began to feel pain on September 9th, the 24th, and the 28th. She iced her ankle on the 28th and realized at this time that her ankle was swollen. She states that she would feel the sensation of "spider crawling on her foot" when she would get this more consistent pain in September. She received a walking boot to wear on 10-. Since wearing the boot she states that she's been experiencing pain in the bottom of the foot that she describes as an aching pain. She does experience numbness at times in the lower leg. Prior to wearing the boot she wasn't having too much difficulty with walking or other daily activities. Now with wearing the boot she is limited with walking and other daily activities. Pt doesn't report any red " flag symptoms with questioning.     No past medical history on file.  Safia Morales  has a past surgical history that includes Strabismus surgery and [place] tooth extraction (2005).    Safia has a current medication list which includes the following prescription(s): methylprednisolone and naproxen.    Review of patient's allergies indicates:   Allergen Reactions    Latex, natural rubber Rash        Prior Therapy:  No prior treatment for her current condition  Social History: Pt lives at home lives alone  Prior Level of Function: Independent with all prior activities  Current Level of Function: Pt is currently in the walking boot and is limited with walking and other WB activities    Pain:  Current 0/10, worst 2/10, best 0/10   Location: right ankles  Description: Aching and Numb    Pts goals: Pt would like to return to riding her bike and being able to drive herself independently.    Objective   Observation:  - Localized swelling just distal to the R tarsal tunnel at the medial calcaneus  - Localized swelling the size of a dime approximately on the dorsal aspect of the foot by the Cuneiform bones  - With figure 8 measurements there is no noticeable swelling however  - R Mid-Calf = 36.8 cm  - L Mid-Calf = 37.7 cm    Ankle Active Range of Motion:    Right Left   Dorsiflexion 8 degrees 12 degrees   Plantarflexion 60 degrees 60 degrees   Inversion 34 degrees 42 degrees   Eversion 20 degrees 20 degrees   - Shaking/juttering during all AROM on R ankle, no pain reported however     Strength:   Right Left   Knee Extension 5/5 5/5   Knee Flexion 5/5 5/5   Ankle Dorsiflexion 5/5 5/5   Ankle Plantarflexion 5/5 5/5   Ankle Inversion 5/5 5/5   Ankle Eversion 5/5 5/5     Joint Mobility:   Joint Mobilization Right End-feel Left End-Feel   A/P Talar Glide 2/6 Tight capsule end-feel no pain 3/6 Normal capsule end-feel no pain   P/A Talar Glide 2/6 Tight capsule end-feel no pain 3/6 Normal capsule end-feel no pain   Long-Axis  Talocrural Distraction 2/6 Tight capsule end-feel no pain 3/6 Normal capsule end-feel no pain   Medial Calcaneal Arc Glide 3/6 Normal capsule end-feel no pain 3/6 Normal capsule end-feel no pain   Lateral Calcaneal Arc Glide 3/6 Normal capsule end-feel no pain 3/6 Normal capsule end-feel no pain     Special Tests:   Right Left   Tarsal Tunnel Stress Test Negative Negative   Tinel's @ Tarsal Tunnel Negative Negative   Anterior Drawer of Ankle Negative Negative   Sciatic Nerve Glide Negative Negative   SLR Negative Negative   Piriformis Test Negative Negative    Fibular Nerve Compression Negative Negative     Pulses Right Left   Dorsalis Pedis A. 2+ 2+   Tibial A. 2+ 2+     Palpation:   - No pain or tenderness with palpation to any of the structures of the lower leg, foot, or ankle    Gait & Balance:  Not tested secondary to MD orders to slowly ween patient from wearing boot during WB activities    CMS Impairment/Limitation/Restriction for FOTO Ankle Survey    Therapist reviewed FOTO scores for Safia Morales on 10/17/2018.   FOTO documents entered into [employer] - see Media section.    Limitation Score: 67%  Category: Mobility    Current : CL = least 60% but < 80% impaired, limited or restricted  Goal: CH = 0 % impaired, limited or restricted         TREATMENT     Treatment Time In: 5:00 PM  Treatment Time Out: 5:18 PM  Total Treatment time separate from Evaluation time:18 minutes    Home Exercises and Patient Education Provided    Written Home Exercises Provided: Yes  Exercises were reviewed and Safia was able to demonstrate them prior to the end of the session.   Pt received a written copy of exercises to perform at home. Safia demonstrated good  understanding of the education provided.     See EMR under patient instructions for exercises given.     Assessment     Safia is a 28 y.o. female referred to outpatient Physical Therapy with a medical diagnosis of Right Foot Pain and Tarsal Tunnel Syndrome of Right  Ankle. Pt presents with objective deficits in edema, calf atrophy, ankle joint hypomobility, decreased ankle AROM, and decreased motor control that limits functional ability to walk without a boot.    Pt prognosis is Excellent.   Pt will benefit from skilled outpatient Physical Therapy to address the deficits stated above and in the chart below, provide pt/family education, and to maximize pt's level of independence.     Plan of care discussed with patient: Yes  Pt's spiritual, cultural and educational needs considered and patient is agreeable to the plan of care and goals as stated below:     Anticipated Barriers for therapy: None    Medical Necessity is demonstrated by the following  History  Co-morbidities and personal factors that may impact the plan of care Co-morbidities:   None    Personal Factors:   no deficits     low   Examination  Body Structures and Functions, activity limitations and participation restrictions that may impact the plan of care Body Regions:   lower extremities    Body Systems:    ROM  gait  motor control  edema    Participation Restrictions:   None    Activity limitations:   Learning and applying knowledge  no deficits    General Tasks and Commands  no deficits    Communication  no deficits    Mobility  walking    Self care  no deficits    Domestic Life  no deficits    Interactions/Relationships  no deficits    Life Areas  no deficits    Community and Social Life  no deficits         low   Clinical Presentation evolving clinical presentation with changing clinical characteristics moderate   Decision Making/ Complexity Score: low       Goals:  Short Term Goals: 4 weeks   1. Patient will demonstrate improved R Ankle AROM to:   Right   Dorsiflexion 10 degrees   Inversion 35 degrees   2. Patient will demonstrate improved balance ability by being able to perform SLS on R for 1 minute without boot and without increased range.  3. Patient will demonstrate improved walking ability by ambulating  for at least 1 hour without the walking boot on and without increased pain.  4. Patient will demonstrate improved functional ability by showing 40% or less limitation according to the FOTO.    Long Term Goals: 8 weeks   1. Patient will demonstrate improved R Ankle AROM to:   Right   Dorsiflexion 15 degrees   Inversion 40 degrees   2. Patient will demonstrate improved balance ability by being able to perform SLS on R for 2 minute without boot and without increased range.  3. Patient will demonstrate improved walking ability by ambulating for at least 3 hour without the walking boot on and without increased pain.  4. Patient will demonstrate improved functional ability by self-reporting being able to ride her bike for her normal distance/time without increased pain.  5. Patient will demonstrate improved functional ability by showing 20% or less limitation according to the FOTO.    Plan   Certification Period/Plan of care expiration: 10/17/2018 to 12-.    Outpatient Physical Therapy 1 times weekly for 8 weeks to include the following interventions: manual therapy as needed, therapeutic exercises to address functional deficits, modalities prn, wound care prn, dry needling prn, treatment by a skilled PTA at times.    George Montes DPT

## 2018-10-25 ENCOUNTER — CLINICAL SUPPORT (OUTPATIENT)
Dept: REHABILITATION | Facility: HOSPITAL | Age: 28
End: 2018-10-25
Attending: PODIATRIST
Payer: COMMERCIAL

## 2018-10-25 DIAGNOSIS — M25.671 STIFFNESS OF ANKLE JOINT, RIGHT: ICD-10-CM

## 2018-10-25 DIAGNOSIS — M79.671 RIGHT FOOT PAIN: ICD-10-CM

## 2018-10-25 PROCEDURE — 97110 THERAPEUTIC EXERCISES: CPT

## 2018-10-25 NOTE — PROGRESS NOTES
Physical Therapy Daily Treatment Note     Name: Safia Morales  Clinic Number: [id number]    Therapy Diagnosis:   Encounter Diagnoses   Name Primary?    Right foot pain     Stiffness of ankle joint, right      Physician: Vamsi Estevez DPM    Visit Date: 10/25/2018    Physician Orders: PT Eval and Treat  Medical Diagnosis:   M79.671 (ICD-10-CM) - Right foot pain   G57.51 (ICD-10-CM) - Tarsal tunnel syndrome of right side   Evaluation Date: 10/17/2018  Authorization Period Expiration: 12-  Plan of Care Certification Period: 12-  Visit # / Visits authorized: 2/20  Precautions: Standard    Time In: 5:04 PM  Time Out: 6:05 PM  Total Billable Time: 54 minutes    Precautions: Standard    Subjective     Pt reports: that she hasn't had any pain since her initial evaluation. She also stated that she has recently walked around without her boot on and had no pain or difficulty2.  She was compliant with home exercise program.  Response to previous treatment: No adverse impact  Functional change: Improved WB tolerance    Pain: 0/10  Location: right ankles     Objective     Safia received therapeutic exercises to develop strength, endurance, ROM and flexibility for 54 minutes including:  - Standing Gastroc Stretch on Slant Board - 3x30 sec.  - Standing Soleus Stretch on Slant Board - 3x30 sec.'  - 3-Way Ankle DF/INV/EV - 2x10 each w/ Green TB  - Supine Bridges w/ Toes Up - 3x12  - Double-Leg Calf Raises - 3x15  - RLE Single-Leg Calf Raises - 3x8  - RLE SLS with Ball Toss on Rebounder - 4x10 w/ Blue MedBall  - Fwd Lunges onto BOSU - 1x10 BLE  - Lateral Lunges onto BOSU - 1x10 BLE    Home Exercises Provided and Patient Education Provided     Written Home Exercises Provided: yes.  Exercises were reviewed and Safia was able to demonstrate them prior to the end of the session.  Safia demonstrated good  understanding of the education provided.     See EMR under Media for exercises provided prior  visit.    Assessment     - Patient had significant difficulty and signs of decreased motor control when first attempting to perform single-leg heel raises on the RLE. She was then instructed to perform double-leg heel raises for multiple sets in order to activate the calf musculature more on the R-side. After these were performed and she re-attempted single-leg heel raises she was able to perform them with no signs of compensation. Fatigue and weakness was still present but she was able to perform them after double-leg heel raises.    Safia is progressing well towards her goals.   Pt prognosis is Excellent.     Pt will continue to benefit from skilled outpatient physical therapy to address the deficits listed in the problem list box on initial evaluation, provide pt/family education and to maximize pt's level of independence in the home and community environment.     Pt's spiritual, cultural and educational needs considered and pt agreeable to plan of care and goals.    Anticipated barriers to physical therapy: None    Goals:   Short Term Goals: 4 weeks   1. Patient will demonstrate improved R Ankle AROM to:    Right   Dorsiflexion 10 degrees   Inversion 35 degrees   2. Patient will demonstrate improved balance ability by being able to perform SLS on R for 1 minute without boot and without increased range.  3. Patient will demonstrate improved walking ability by ambulating for at least 1 hour without the walking boot on and without increased pain.  4. Patient will demonstrate improved functional ability by showing 40% or less limitation according to the FOTO.     Long Term Goals: 8 weeks   1. Patient will demonstrate improved R Ankle AROM to:    Right   Dorsiflexion 15 degrees   Inversion 40 degrees   2. Patient will demonstrate improved balance ability by being able to perform SLS on R for 2 minute without boot and without increased range.  3. Patient will demonstrate improved walking ability by ambulating for at  least 3 hour without the walking boot on and without increased pain.  4. Patient will demonstrate improved functional ability by self-reporting being able to ride her bike for her normal distance/time without increased pain.  5. Patient will demonstrate improved functional ability by showing 20% or less limitation according to the FOTO.    Plan     Continue with Current POC. Progress WB activities and dynamic balance activities as well.    George Montes, PT

## 2018-10-29 ENCOUNTER — CLINICAL SUPPORT (OUTPATIENT)
Dept: REHABILITATION | Facility: HOSPITAL | Age: 28
End: 2018-10-29
Attending: PODIATRIST
Payer: COMMERCIAL

## 2018-10-29 DIAGNOSIS — M25.671 STIFFNESS OF ANKLE JOINT, RIGHT: ICD-10-CM

## 2018-10-29 DIAGNOSIS — M79.671 RIGHT FOOT PAIN: ICD-10-CM

## 2018-10-29 PROCEDURE — 97110 THERAPEUTIC EXERCISES: CPT

## 2018-10-29 NOTE — PROGRESS NOTES
"  Physical Therapy Daily Treatment Note     Name: Safia Morales  Clinic Number: [id number]    Therapy Diagnosis:   Encounter Diagnoses   Name Primary?    Right foot pain     Stiffness of ankle joint, right      Physician: Vamsi Estevez DPM    Visit Date: 10/29/2018    Physician Orders: PT Eval and Treat  Medical Diagnosis:   M79.671 (ICD-10-CM) - Right foot pain   G57.51 (ICD-10-CM) - Tarsal tunnel syndrome of right side   Evaluation Date: 10/17/2018  Authorization Period Expiration: 12-  Plan of Care Certification Period: 12-  Visit # / Visits authorized: 3/20  Precautions: Standard    Time In: 4:00 PM  Time Out: 5:05 PM  Total Billable Time: 54 minutes    Precautions: Standard    Subjective     Pt reports: that she increased her activity level this weekend and has mostly experienced soreness the last few days. However, she had intermittent bouts of pain Saturday, Sunday, and Today but her pain has come and gone. She did state she had some "spider-like" sensation on the medial part of her foot this weekend but hasn't experienced that sensation since.  She was compliant with home exercise program.  Response to previous treatment: No increase in pain or symptoms  Functional change: Improved WB tolerance    Pain: 0/10  Location: right ankles     Objective     Safia received therapeutic exercises to develop strength, endurance, ROM and flexibility for 54 minutes including:  - Recumbent Bike - 6 min.  - Standing Gastroc Stretch on Slant Board - 3x30 sec.  - Standing Soleus Stretch on Slant Board - 3x30 sec.  - HS Stretch - 3x30 sec.  - Fwd/Lateral/Bkwd Step-ups on 6-Inch Step - 2x15 each on RLE  - Single-Leg Heel Raises - 1x15 on RLE  - Double-Leg Balance on Wobble Board - 1xfatigue  - SLS Ball Toss on Rebounder - 2x20 w/ Yellow MedBall BLE  - Standing Alt. Marching on Rebounder - 2x15 each    Bethany received cold pack to decrease pain, swelling, and improve recovery time for 10 minutes to R " Ankle.  Home Exercises Provided and Patient Education Provided     Written Home Exercises Provided: yes.  Exercises were reviewed and Safia was able to demonstrate them prior to the end of the session.  Safia demonstrated good  understanding of the education provided.     See EMR under Media for exercises provided prior visit.    Assessment     - During reverse step-ups the patient had an instance where she fell into ankle eversion position and immediately reported slight numbness in medial mid-foot and forefoot that only lasted for a brief moment. This may be a sign that the patient is having intermittent pain/symptoms caused by a tibial nerve impairment that wasn't reproduced at her initial evaluation with many nerve testing procedures. This will continue to be monitored. The patient also still demonstrates R calf weakness as she had difficulty with the single-leg calf raises half-way through her exercises.    Safia is progressing well towards her goals.   Pt prognosis is Excellent.     Pt will continue to benefit from skilled outpatient physical therapy to address the deficits listed in the problem list box on initial evaluation, provide pt/family education and to maximize pt's level of independence in the home and community environment.     Pt's spiritual, cultural and educational needs considered and pt agreeable to plan of care and goals.    Anticipated barriers to physical therapy: None    Goals:   Short Term Goals: 4 weeks   1. Patient will demonstrate improved R Ankle AROM to:    Right   Dorsiflexion 10 degrees   Inversion 35 degrees   2. Patient will demonstrate improved balance ability by being able to perform SLS on R for 1 minute without boot and without increased range.  3. Patient will demonstrate improved walking ability by ambulating for at least 1 hour without the walking boot on and without increased pain.  4. Patient will demonstrate improved functional ability by showing 40% or less  "limitation according to the FOTO.     Long Term Goals: 8 weeks   1. Patient will demonstrate improved R Ankle AROM to:    Right   Dorsiflexion 15 degrees   Inversion 40 degrees   2. Patient will demonstrate improved balance ability by being able to perform SLS on R for 2 minute without boot and without increased range.  3. Patient will demonstrate improved walking ability by ambulating for at least 3 hour without the walking boot on and without increased pain.  4. Patient will demonstrate improved functional ability by self-reporting being able to ride her bike for her normal distance/time without increased pain.  5. Patient will demonstrate improved functional ability by showing 20% or less limitation according to the FOTO.    Plan     Continue with Current POC. Continue to monitor the numbness and "spider-like" sensations the patient experienced over the weekend.    George Montes, PT   "

## 2018-11-05 ENCOUNTER — CLINICAL SUPPORT (OUTPATIENT)
Dept: REHABILITATION | Facility: HOSPITAL | Age: 28
End: 2018-11-05
Attending: PODIATRIST
Payer: COMMERCIAL

## 2018-11-05 DIAGNOSIS — M25.671 STIFFNESS OF ANKLE JOINT, RIGHT: ICD-10-CM

## 2018-11-05 DIAGNOSIS — M79.671 RIGHT FOOT PAIN: ICD-10-CM

## 2018-11-05 PROCEDURE — 97140 MANUAL THERAPY 1/> REGIONS: CPT

## 2018-11-05 PROCEDURE — 97110 THERAPEUTIC EXERCISES: CPT

## 2018-11-05 NOTE — PROGRESS NOTES
Physical Therapy Daily Treatment Note     Name: Safia Morales  Clinic Number: [id number]    Therapy Diagnosis:   Encounter Diagnoses   Name Primary?    Right foot pain     Stiffness of ankle joint, right      Physician: Vamsi Estevez DPM    Visit Date: 11/5/2018    Physician Orders: PT Eval and Treat  Medical Diagnosis:   M79.671 (ICD-10-CM) - Right foot pain   G57.51 (ICD-10-CM) - Tarsal tunnel syndrome of right side   Evaluation Date: 10/17/2018  Authorization Period Expiration: 12-  Plan of Care Certification Period: 12-  Visit # / Visits authorized: 3/20  Precautions: Standard    Time In: 4:06 PM  Time Out: 5:08 PM  Total Billable Time: 45 minutes    Precautions: Standard    Subjective     Pt reports: that she had an unusual event this weekend. She noticed when she took off her boot at some point this weekend she had very localized swelling on the dorsal aspect of her R foot. She said this area didn't hurt, it was just swollen and this swelling was gone by the next morning. She states she plans on going to get a second opinion tomorrow.    She was compliant with home exercise program.  Response to previous treatment: No adverse impact  Functional change: Improved balance ability    Pain:  0/10  Location: right ankles     Objective     Safia received manual therapy to improve joint mobility and soft-tissue extensibility for 10 minutes to the R Ankle including:  - Side-Lying Lateral Calcaneal Arc Glide with Ankle DF Mobilization - Grade IV  - Supine Lateral Calcaneal Arc Glides - Grade IV  - Ankle DF w/ Global Toe Extension Stretch    Safia received therapeutic exercises to develop strength, endurance, ROM and flexibility for 35 minutes including:  - Recumbent Bike - 8 min.  - Standing Gastroc Stretch on Slant Board - 3x30 sec.  - Standing Soleus Stretch on Slant Board - 3x30 sec.  - Single-Leg Heel Raises - 3x8 on RLE  - Walking Alt. Fwd Lunges - 2 Turf Lengths  - SLS Ball Toss on  Rebounder - 2x20 w/ Orange MedBall on RLE  - Fwd Step-ups on 12-Inch Step - 3x8 on RLE    Bethany received cold pack to decrease pain, swelling, and improve recovery time for 10 minutes to R Ankle.    Home Exercises Provided and Patient Education Provided     Written Home Exercises Provided: yes.  Exercises were reviewed and Safia was able to demonstrate them prior to the end of the session.  Safia demonstrated good  understanding of the education provided.     See EMR under Media for exercises provided prior visit.    Assessment     - There is significant hypomobility present during calcaneal lateral arc glides to the R subtalar joint with a hard capsule end-feel and no pain. However, after continually performing this mobilization to end-range the patient does state that she felt some numbness on the bottom of the big toe. This numbness only lasted temporarily and couldn't be reproduced afterwards. About half-way through the patient's exercise program she did state she had some medial joint line pain in the subtalar joint. Her primary issue may be a joint capsule restriction at the medial subtalar joint or a potential Deltoid Ligament impairment from her traumatic injury several months ago.    Safia is progressing well towards her goals.   Pt prognosis is Excellent.     Pt will continue to benefit from skilled outpatient physical therapy to address the deficits listed in the problem list box on initial evaluation, provide pt/family education and to maximize pt's level of independence in the home and community environment.     Pt's spiritual, cultural and educational needs considered and pt agreeable to plan of care and goals.    Anticipated barriers to physical therapy: None    Goals:   Short Term Goals: 4 weeks   1. Patient will demonstrate improved R Ankle AROM to: (Progressing, NOT MET)    Right   Dorsiflexion 10 degrees   Inversion 35 degrees   2. Patient will demonstrate improved balance ability by being  able to perform SLS on R for 1 minute without boot and without increased range. (Progressing, NOT MET)  3. Patient will demonstrate improved walking ability by ambulating for at least 1 hour without the walking boot on and without increased pain. (Progressing, NOT MET)  4. Patient will demonstrate improved functional ability by showing 40% or less limitation according to the FOTO. (Progressing, NOT MET)     Long Term Goals: 8 weeks   1. Patient will demonstrate improved R Ankle AROM to: (Progressing, NOT MET)    Right   Dorsiflexion 15 degrees   Inversion 40 degrees   2. Patient will demonstrate improved balance ability by being able to perform SLS on R for 2 minute without boot and without increased range. (Progressing, NOT MET)  3. Patient will demonstrate improved walking ability by ambulating for at least 3 hour without the walking boot on and without increased pain. (Progressing, NOT MET)  4. Patient will demonstrate improved functional ability by self-reporting being able to ride her bike for her normal distance/time without increased pain. (Progressing, NOT MET)  5. Patient will demonstrate improved functional ability by showing 20% or less limitation according to the FOTO. (Progressing, NOT MET)    Plan     Continue with Current POC. Focus on improving subtalar joint mobility as well as exercises promoting improved ankle stability.    George Montes, PT

## 2018-11-13 ENCOUNTER — OFFICE VISIT (OUTPATIENT)
Dept: UROGYNECOLOGY | Facility: CLINIC | Age: 28
End: 2018-11-13
Payer: COMMERCIAL

## 2018-11-13 VITALS
HEIGHT: 63 IN | BODY MASS INDEX: 25.78 KG/M2 | WEIGHT: 145.5 LBS | DIASTOLIC BLOOD PRESSURE: 80 MMHG | SYSTOLIC BLOOD PRESSURE: 120 MMHG

## 2018-11-13 DIAGNOSIS — Z01.419 WELL WOMAN EXAM: Primary | ICD-10-CM

## 2018-11-13 DIAGNOSIS — Z12.4 ENCOUNTER FOR SCREENING FOR CERVICAL CANCER: ICD-10-CM

## 2018-11-13 PROCEDURE — 99385 PREV VISIT NEW AGE 18-39: CPT | Mod: S$GLB,,, | Performed by: NURSE PRACTITIONER

## 2018-11-13 PROCEDURE — 87624 HPV HI-RISK TYP POOLED RSLT: CPT

## 2018-11-13 PROCEDURE — 88175 CYTOPATH C/V AUTO FLUID REDO: CPT

## 2018-11-13 PROCEDURE — 99999 PR PBB SHADOW E&M-EST. PATIENT-LVL III: CPT | Mod: PBBFAC,,, | Performed by: NURSE PRACTITIONER

## 2018-11-13 NOTE — PROGRESS NOTES
"2018    SUBJECTIVE:   28 y.o. female for annual exam.    History reviewed. No pertinent past medical history.    Past Surgical History:   Procedure Laterality Date    STRABISMUS SURGERY      x 2    WISDOM TOOTH EXTRACTION         No current outpatient medications on file.     No current facility-administered medications for this visit.      Allergies: Latex, natural rubber   Patient's last menstrual period was 2018.       Regular periods- last 3-4 days.  Around every 28 days      Well Woman:  Pap: 9 years ago, per pt WNL        Family History  Family History   Problem Relation Age of Onset    Diabetes Paternal Uncle     Cancer Paternal Uncle     Diabetes Maternal Grandfather     No Known Problems Mother     No Known Problems Father     No Known Problems Brother       Colon CA: No  Breast CA: No  GYN CA: No   CA: No      ROS:  Feeling well.   No dyspnea or chest pain on exertion.    No abdominal pain, change in bowel habits, black or bloody stools.    No urinary tract symptoms.   GYN ROS: normal menses, no abnormal bleeding, pelvic pain or discharge, no breast pain or new or enlarging lumps on self exam.   No neurological complaints.    OBJECTIVE:   The patient appears well, alert, oriented x 3, in no distress.  /80 (BP Location: Left arm, Patient Position: Sitting, BP Method: Medium (Manual))   Ht 5' 3" (1.6 m)   Wt 66 kg (145 lb 8.1 oz)   LMP 2018   BMI 25.77 kg/m²   ENT normal.  Neck supple. No adenopathy or thyromegaly. RY.   Lungs are clear, good air entry, no wheezes, rhonchi or rales.   S1 and S2 normal, no murmurs, regular rate and rhythm.   Abdomen soft without tenderness, guarding, mass or organomegaly.   Extremities show no edema, normal peripheral pulses.   Neurological is normal, no focal findings.    BREAST EXAM: breasts appear normal, no suspicious masses, no skin or nipple changes or axillary nodes    PELVIC EXAM:   VULVA: normal appearing vulva with no " masses, tenderness or lesions,   VAGINA: normal appearing vagina with normal color and discharge, no lesions,  CERVIX: normal appearing cervix without discharge or lesions,   UTERUS: uterus is normal size, shape, consistency and nontender,   ADNEXA: no masses,   RECTAL: deferred    ASSESSMENT:   1. Well woman exam     2. Encounter for screening for cervical cancer   Liquid-based pap smear, screening    HPV High Risk Genotypes, PCR       PLAN:     1. Well woman  -- pap smear today  -- 2-3 weeks for results    2.  RTC 1 year for annual      30 minutes were spent in face to face time with this patient  90 % of this time was spent in counseling and/or coordination of care  Lolis PINA Luiz  [facility]  Division of Female Pelvic Medicine and Reconstructive Surgery  Department of Obstetrics & Gynecology

## 2018-11-16 LAB
HPV HR 12 DNA CVX QL NAA+PROBE: NEGATIVE
HPV16 AG SPEC QL: NEGATIVE
HPV18 DNA SPEC QL NAA+PROBE: NEGATIVE

## 2018-11-19 ENCOUNTER — CLINICAL SUPPORT (OUTPATIENT)
Dept: REHABILITATION | Facility: HOSPITAL | Age: 28
End: 2018-11-19
Attending: PODIATRIST
Payer: COMMERCIAL

## 2018-11-19 DIAGNOSIS — M79.671 RIGHT FOOT PAIN: ICD-10-CM

## 2018-11-19 DIAGNOSIS — M25.671 STIFFNESS OF ANKLE JOINT, RIGHT: ICD-10-CM

## 2018-11-19 PROCEDURE — 97110 THERAPEUTIC EXERCISES: CPT

## 2018-11-19 NOTE — PROGRESS NOTES
Physical Therapy Daily Treatment Note     Name: Safia Morales  Clinic Number: [id number]    Therapy Diagnosis:   Encounter Diagnoses   Name Primary?    Right foot pain     Stiffness of ankle joint, right      Physician: Vamsi Estevez DPM    Visit Date: 11/19/2018    Physician Orders: PT Eval and Treat  Medical Diagnosis:   M79.671 (ICD-10-CM) - Right foot pain   G57.51 (ICD-10-CM) - Tarsal tunnel syndrome of right side   Evaluation Date: 10/17/2018  Authorization Period Expiration: 12-  Plan of Care Certification Period: 12-  Visit # / Visits authorized: 5/20  Precautions: Standard    Time In: 4:00 PM  Time Out: 5:00 PM  Total Billable Time: 50 minutes    Precautions: Standard    Subjective     Pt reports: increased swelling in right foot. She reports seeing another MD who states to wean off her boot. She states her swelling has been worse since.    She was compliant with home exercise program.  Response to previous treatment: No adverse impact  Functional change: Improved balance ability    Pain:  0/10  Location: right ankles     Objective   FOTO: 54% on 11/19/2018    Safia received manual therapy to improve joint mobility and soft-tissue extensibility for 10 minutes to the R Ankle including: - NP  - Side-Lying Lateral Calcaneal Arc Glide with Ankle DF Mobilization - Grade IV  - Supine Lateral Calcaneal Arc Glides - Grade IV  - Ankle DF w/ Global Toe Extension Stretch    Safia received therapeutic exercises to develop strength, endurance, ROM and flexibility for 45 minutes including:  - Recumbent Bike - 10 min.  - BLE SLR 2x15  - BLE sidelying hip abduction 2x15  - Standing Gastroc Stretch on Slant Board - 2x60 sec.  - Standing Soleus Stretch on Slant Board - 2x60 sec.  - Bilateral Calf Raise 2x15  - Single-Leg Heel Raises - 2x5 on RLE  - Wobble Board Medial/Lateral x 20  - Wobble Board Anterior/Posterior x 20  - Static Standing on Wobble Board Medial/Lateral x 20  - Static Standing on  Wobble Board Anterior/Posterior x 20  - Step-ups on 14.5inch box BLE x 10    - Walking Alt. Fwd Lunges - 2 Turf Lengths - NP  - SLS Ball Toss on Rebounder - 2x20 w/ Orange MedBall on RLE - NP  - Fwd Step-ups on 12-Inch Step - 3x8 on RLE - NP    Bethany received cold pack to decrease pain, swelling, and improve recovery time for 10 minutes to R Ankle.    Home Exercises Provided and Patient Education Provided     Written Home Exercises Provided: yes.  Exercises were reviewed and Safia was able to demonstrate them prior to the end of the session.  Safia demonstrated good  understanding of the education provided.     See EMR under Media for exercises provided prior visit.    Assessment     Patient continues to demonstrate poor RLE single leg static and dynamic balance with poor ankle stability. Worked on hip strengthening to normalize mobility.    Safia is progressing well towards her goals.   Pt prognosis is Excellent.     Pt will continue to benefit from skilled outpatient physical therapy to address the deficits listed in the problem list box on initial evaluation, provide pt/family education and to maximize pt's level of independence in the home and community environment.     Pt's spiritual, cultural and educational needs considered and pt agreeable to plan of care and goals.    Anticipated barriers to physical therapy: None    Goals:   Short Term Goals: 4 weeks   1. Patient will demonstrate improved R Ankle AROM to: (Progressing, NOT MET)    Right   Dorsiflexion 10 degrees   Inversion 35 degrees   2. Patient will demonstrate improved balance ability by being able to perform SLS on R for 1 minute without boot and without increased range. (Progressing, NOT MET)  3. Patient will demonstrate improved walking ability by ambulating for at least 1 hour without the walking boot on and without increased pain. (Progressing, NOT MET)  4. Patient will demonstrate improved functional ability by showing 40% or less  limitation according to the FOTO. (Progressing, NOT MET)     Long Term Goals: 8 weeks   1. Patient will demonstrate improved R Ankle AROM to: (Progressing, NOT MET)    Right   Dorsiflexion 15 degrees   Inversion 40 degrees   2. Patient will demonstrate improved balance ability by being able to perform SLS on R for 2 minute without boot and without increased range. (Progressing, NOT MET)  3. Patient will demonstrate improved walking ability by ambulating for at least 3 hour without the walking boot on and without increased pain. (Progressing, NOT MET)  4. Patient will demonstrate improved functional ability by self-reporting being able to ride her bike for her normal distance/time without increased pain. (Progressing, NOT MET)  5. Patient will demonstrate improved functional ability by showing 20% or less limitation according to the FOTO. (Progressing, NOT MET)    Plan     Continue with Current POC. Focus on improving subtalar joint mobility as well as exercises promoting improved ankle stability.    Shalini Montes, PT, DPT

## 2018-11-21 ENCOUNTER — PATIENT MESSAGE (OUTPATIENT)
Dept: UROGYNECOLOGY | Facility: CLINIC | Age: 28
End: 2018-11-21

## 2018-11-29 ENCOUNTER — CLINICAL SUPPORT (OUTPATIENT)
Dept: REHABILITATION | Facility: HOSPITAL | Age: 28
End: 2018-11-29
Attending: PODIATRIST
Payer: COMMERCIAL

## 2018-11-29 DIAGNOSIS — M25.671 STIFFNESS OF ANKLE JOINT, RIGHT: ICD-10-CM

## 2018-11-29 DIAGNOSIS — M79.671 RIGHT FOOT PAIN: ICD-10-CM

## 2018-11-29 PROCEDURE — 97110 THERAPEUTIC EXERCISES: CPT

## 2018-11-29 PROCEDURE — 97140 MANUAL THERAPY 1/> REGIONS: CPT

## 2018-11-29 NOTE — PROGRESS NOTES
"  Physical Therapy Daily Treatment Note     Name: Safia Morales  Clinic Number: [id number]    Therapy Diagnosis:   Encounter Diagnoses   Name Primary?    Right foot pain     Stiffness of ankle joint, right      Physician: Vamsi Estevez DPM    Visit Date: 11/29/2018    Physician Orders: PT Eval and Treat  Medical Diagnosis:   M79.671 (ICD-10-CM) - Right foot pain   G57.51 (ICD-10-CM) - Tarsal tunnel syndrome of right side   Evaluation Date: 10/17/2018  Authorization Period Expiration: 12-  Plan of Care Certification Period: 12-    Visit # / Visits authorized: 6/20    Precautions: Standard    Time In: 7:55 AM  Time Out: 9:15 AM  Total Billable Time: 55 minutes    Precautions: Standard    Subjective     Pt reports: that she has some pictures to show me that show some of the intermittent swelling that she's been having. She has had no pain but continues to experience this unusual swelling.   - Patient brought in 4 pictures  1. Thursday Picture after riding bike approximately 4 miles, walking a trail twice, and walking to beach shows dorsal and lateral midfoot and rear foot swelling.  2. Friday Picture after riding bike 7 miles and walking in the "cold" [facility] shows the same type of swelling as picture 1 but not as diffuse, however there is a noticeable blue coloration in there lower leg at the distal Tibia/Fibula area (this coloration doesn't continue into the foot).  3. Saturday Morning Picture upon waking shows no discoloration or swelling and a normal appearance.  4. Saturday Afternoon Picture shows slight swelling on the dorsal aspect of the midfoot close to the Dorsalis Pedis Artery and slight swelling in close proximity to the distal fibular head.    She was compliant with home exercise program.    Response to previous treatment: No adverse impact  Functional change: Patient continues to demonstrate improvement in balance ability    Pain:  0/10  Location: right ankles     Objective "     Safia received manual therapy to improve joint mobility and soft-tissue extensibility for 25 minutes to the R Ankle including: - NP  - MWM's Supine Lateral Calcaneal Arc Glide w/ Ankle DF Mobilization - Grade IV  - Side-Lying Lateral Calcaneal Arc Glide with Ankle DF Mobilization - Grade IV  - Supine Lateral Calcaneal Arc Glides - Grade IV  - Extensive period of time spent assessing Dorsalis Pedis Artery and Tibial Artery in standing and un-weighted positions. See Assessment area for findings.    Safia received therapeutic exercises to develop strength, endurance, ROM and flexibility for 30 minutes including:  - Stationary Bike - 10 min. @ Lv. 5  - R Standing Gastroc Stretch on Slant Board - 3x30 sec.  - R Standing Soleus Stretch on Slant Board - 3x30 sec.  - R Half-Kneeling Ankle DF Stretch/Mobilization - 10x10 sec.  - R SLS on Airex w/ Rebounder Ball Toss - 2x20 w/ Blue MedBall  - R SLS w/ Multi-Directions Cone Touching - 3 min.  - B Reverse Lunges - 1x10 Each    Bethany received moist hot pack to decrease pain, swelling, and improve recovery time for 10 minutes to R Ankle.    Home Exercises Provided and Patient Education Provided     Written Home Exercises Provided: yes.  Exercises were reviewed and Safia was able to demonstrate them prior to the end of the session.  Safia demonstrated good  understanding of the education provided.     See EMR under Media for exercises provided prior visit.    Assessment     - While treating the patient today and after standing for a couple of minutes, I noticed that her R foot became red in appearance compared to her L. She also began to have some minor swelling in the dorsal and lateral aspects of the mid-foot. I checked both her Dorsalis Pedis Artery and Tibial Artery while standing in the RLE and had difficulty finding both pulses. However, when checking her in a relaxed seated position I was able to feel her Dorsalis Pedis Artery but still had difficulty finding  the Tibial Artery. In an un-weighted position her R foot also returned to it's normal coloration compared to the L. I kept my fingers on the Dorsalis Pedis Artery location and had her stand back up. It appeared that over time her pulse slowly diminished and her foot did became more red in coloration as she stood for an extended period. Other than the swelling, she experiences no neurological or pain symptoms. I messaged the referring MD about these findings and my description of the photos the patient brought in.    Safia is progressing well towards her goals.     Pt prognosis is Excellent.     Pt will continue to benefit from skilled outpatient physical therapy to address the deficits listed in the problem list box on initial evaluation, provide pt/family education and to maximize pt's level of independence in the home and community environment.     Pt's spiritual, cultural and educational needs considered and pt agreeable to plan of care and goals.    Anticipated barriers to physical therapy: None    Goals:   Short Term Goals: 4 weeks   1. Patient will demonstrate improved R Ankle AROM to: (Progressing, NOT MET)    Right   Dorsiflexion 10 degrees   Inversion 35 degrees   2. Patient will demonstrate improved balance ability by being able to perform SLS on R for 1 minute without boot and without increased range. (Progressing, NOT MET)  3. Patient will demonstrate improved walking ability by ambulating for at least 1 hour without the walking boot on and without increased pain. (Progressing, NOT MET)  4. Patient will demonstrate improved functional ability by showing 40% or less limitation according to the FOTO. (Progressing, NOT MET)     Long Term Goals: 8 weeks   1. Patient will demonstrate improved R Ankle AROM to: (Progressing, NOT MET)    Right   Dorsiflexion 15 degrees   Inversion 40 degrees   2. Patient will demonstrate improved balance ability by being able to perform SLS on R for 2 minute without boot and  without increased range. (Progressing, NOT MET)  3. Patient will demonstrate improved walking ability by ambulating for at least 3 hour without the walking boot on and without increased pain. (Progressing, NOT MET)  4. Patient will demonstrate improved functional ability by self-reporting being able to ride her bike for her normal distance/time without increased pain. (Progressing, NOT MET)  5. Patient will demonstrate improved functional ability by showing 20% or less limitation according to the FOTO. (Progressing, NOT MET)    Plan     Continue with Current POC. Focus on improving subtalar joint mobility as well as exercises promoting improved ankle stability.    George Montes, PT, DPT

## 2018-11-30 ENCOUNTER — TELEPHONE (OUTPATIENT)
Dept: PODIATRY | Facility: CLINIC | Age: 28
End: 2018-11-30

## 2018-11-30 NOTE — TELEPHONE ENCOUNTER
Left message that we can work her in sooner in [place] if needed. Otherwise she will be seen at [facility].

## 2018-11-30 NOTE — TELEPHONE ENCOUNTER
"----- Message from Vamsi Estevez DPM sent at 11/29/2018  6:21 PM CST -----  Please schedule patient for a visit.  ----- Message -----  From: George Montes PT  Sent: 11/29/2018   9:01 AM  To: KENYA Montemayor Dr.,    I've been seeing Mrs. Morales for several weeks now and she has been doing well. She isn't having pain with any of her daily activities and from an activity tolerance and strength perspective is progressing well. However, she continues to have unusual swelling in the R ankle/foot post-activity. She even brought in pictures of her foot on a day-to-day basis and what it looked like post-activity. The following are my descriptions in my treatment note of the photos she brought in:  "1. Thursday Picture after riding bike approximately 4 miles, walking a trail twice, and walking to beach shows dorsal and lateral midfoot and rear foot swelling.  2. Friday Picture after riding bike 7 miles and walking in the "cold" [facility] shows the same type of swelling as picture 1 but not as diffuse, however there is a noticeable blue coloration in the lower leg at the distal Tibia/Fibula area (this coloration doesn't extend into the foot).  3. Saturday Morning Picture upon waking shows no discoloration or swelling and a normal appearance.  4. Saturday Afternoon Picture shows slight swelling on the dorsal aspect of the midfoot close to the Dorsalis Pedis Artery and slight swelling in close proximity to the distal fibular head."    Also, while treating her today and after standing for a couple of minutes, I noticed that her R foot became red in appearance compared to her L. She also began to have some minor swelling in the dorsal and lateral aspects of the mid-foot. I checked both her Dorsalis Pedis Artery and Tibial Artery while standing in the RLE and had difficulty finding both pulses. However, when checking her in a relaxed seated position I was able to feel her Dorsalis Pedis Artery but still " had difficulty finding the Tibial Artery. I kept my fingers on the Dorsalis Pedis Artery location and had her stand back up. It appeared that over time her pulse slowly diminished and her foot did become more red again as she stood for an extended period. Other than the swelling, she experiences no neurological or pain symptoms. At this point I'm not quite sure what's going on but it seems there may be potential for some type of minor vascular compromise. I wanted to inform you of the things the patient has been reporting and what I saw during treatment today so you could make a determination of what you would like the patient to do. I also told the patient to give you a call and schedule an appointment. Please let me know if there is anything else I can do for you in regards to this patient.    Sincerely,    George Montes, PT, DPT

## 2018-12-05 ENCOUNTER — HOSPITAL ENCOUNTER (OUTPATIENT)
Dept: RADIOLOGY | Facility: HOSPITAL | Age: 28
Discharge: HOME | End: 2018-12-05
Attending: PODIATRIST
Payer: COMMERCIAL

## 2018-12-05 ENCOUNTER — OFFICE VISIT (OUTPATIENT)
Dept: PODIATRY | Facility: CLINIC | Age: 28
End: 2018-12-05
Payer: COMMERCIAL

## 2018-12-05 VITALS
WEIGHT: 145 LBS | DIASTOLIC BLOOD PRESSURE: 86 MMHG | BODY MASS INDEX: 24.75 KG/M2 | HEIGHT: 64 IN | SYSTOLIC BLOOD PRESSURE: 135 MMHG | HEART RATE: 82 BPM

## 2018-12-05 DIAGNOSIS — M79.671 RIGHT FOOT PAIN: ICD-10-CM

## 2018-12-05 DIAGNOSIS — G57.51 TARSAL TUNNEL SYNDROME OF RIGHT SIDE: ICD-10-CM

## 2018-12-05 DIAGNOSIS — M79.671 RIGHT FOOT PAIN: Primary | ICD-10-CM

## 2018-12-05 PROCEDURE — 99999 PR PBB SHADOW E&M-EST. PATIENT-LVL III: CPT | Mod: PBBFAC,,, | Performed by: PODIATRIST

## 2018-12-05 PROCEDURE — 3008F BODY MASS INDEX DOCD: CPT | Mod: CPTII,S$GLB,, | Performed by: PODIATRIST

## 2018-12-05 PROCEDURE — 73718 MRI LOWER EXTREMITY W/O DYE: CPT | Mod: TC,RT

## 2018-12-05 PROCEDURE — 99212 OFFICE O/P EST SF 10 MIN: CPT | Mod: S$GLB,,, | Performed by: PODIATRIST

## 2018-12-05 PROCEDURE — 73718 MRI LOWER EXTREMITY W/O DYE: CPT | Mod: 26,RT,, | Performed by: RADIOLOGY

## 2018-12-05 NOTE — PROGRESS NOTES
Subjective:      Patient ID: Safia Morales is a 28 y.o. female.    Chief Complaint: Foot Problem (swelling)    Intermittent pain, tingling, and feeling like spiders in arch right.  Gradual onset, not over past several weeks since a cycling race, aggravated by increased weight bearing, shoe gear, pressure.  No previous medical treatment.  OTC pain med not helping.   Denies trauma, surgery.  nsaid helps some.  xrays normal 10/1/18    PT relates some swelling with WB and Color change with change in palpability of pulses.  Review of Systems   Constitution: Negative for chills, diaphoresis, fever, malaise/fatigue and night sweats.   Cardiovascular: Negative for claudication, cyanosis, leg swelling and syncope.   Skin: Negative for color change, dry skin, nail changes, rash, suspicious lesions and unusual hair distribution.   Musculoskeletal: Negative for falls, joint pain, joint swelling, muscle cramps, muscle weakness and stiffness.   Gastrointestinal: Negative for constipation, diarrhea, nausea and vomiting.   Neurological: Positive for paresthesias. Negative for brief paralysis, disturbances in coordination, focal weakness, numbness, sensory change and tremors.           Objective:      Physical Exam   Constitutional: She is oriented to person, place, and time. She appears well-developed and well-nourished. She is cooperative. No distress.   Cardiovascular:   Pulses:       Popliteal pulses are 2+ on the right side, and 2+ on the left side.        Dorsalis pedis pulses are 2+ on the right side, and 2+ on the left side.        Posterior tibial pulses are 2+ on the right side, and 2+ on the left side.   Capillary refill 3 seconds all toes/distal feet, all toes/both feet warm to touch.      Negative lymphadenopathy bilateral popliteal fossa and tarsal tunnel.      Negavie lower extremity edema bilateral.     Musculoskeletal:        Right ankle: She exhibits normal range of motion, no swelling, no ecchymosis, no  deformity, no laceration and normal pulse. Achilles tendon normal. Achilles tendon exhibits no pain, no defect and normal Song's test results.   Ankle dorsiflexion decreased at <10 degrees bilateral with moderate increase with knee flexion bilateral.    Otherwise, Normal angle, base, station of gait. All ten toes without clubbing, cyanosis, or signs of ischemia.  No pain to palpation bilateral lower extremities.  Range of motion, stability, muscle strength, and muscle tone normal bilateral feet and legs.    Lymphadenopathy: No inguinal adenopathy noted on the right or left side.   Negative lymphadenopathy bilateral popliteal fossa and tarsal tunnel.    Negative lymphangitic streaking bilateral feet/ankles/legs.   Neurological: She is alert and oriented to person, place, and time. She has normal strength. She displays no atrophy and no tremor. No sensory deficit. She exhibits normal muscle tone. Gait normal.   Reflex Scores:       Patellar reflexes are 2+ on the right side and 2+ on the left side.       Achilles reflexes are 2+ on the right side and 2+ on the left side.  Negative tinel sign to percussion sural, superficial peroneal, deep peroneal, saphenous, and posterior tibial nerves right and left ankles and feet.     Skin: Skin is warm, dry and intact. Capillary refill takes 2 to 3 seconds. No abrasion, no bruising, no burn, no ecchymosis, no laceration, no lesion and no rash noted. She is not diaphoretic. No cyanosis or erythema. No pallor. Nails show no clubbing.     Skin is normal age and health appropriate color, turgor, texture, and temperature bilateral lower extremities without ulceration, hyperpigmentation, discoloration, masses nodules or cords palpated.  No ecchymosis, erythema, edema, or cardinal signs of infection bilateral lower extremities.     Psychiatric: She has a normal mood and affect.             Assessment:       Encounter Diagnoses   Name Primary?    Right foot pain Yes    Tarsal  tunnel syndrome of right side          Plan:       Safia was seen today for foot problem.    Diagnoses and all orders for this visit:    Right foot pain  -     MRI Foot (Hindfoot) Right Without Contrast; Future    Tarsal tunnel syndrome of right side  -     MRI Foot (Hindfoot) Right Without Contrast; Future      I counseled the patient on her conditions, their implications and medical management.    Stop PT, fill  custom orthotics, continue nsaid prn.    Discussed conservative treatment with shoes of adequate dimensions, material, and style to alleviate symptoms and delay or prevent surgical intervention.    Fx boot, ambulate to tolerance weaning to shoes/inserts as symptoms allow.    Discussed that while these symptoms are frustrating, they do not seem acute or dangerous.    Rx MRI - space occupying lesion, structural anomaly?    Declines NCV.  Consider vascular consultation if symptoms persist without explanation.          Follow-up in about 1 month (around 1/5/2019).

## 2018-12-06 ENCOUNTER — TELEPHONE (OUTPATIENT)
Dept: PODIATRY | Facility: CLINIC | Age: 28
End: 2018-12-06

## 2018-12-06 ENCOUNTER — CLINICAL SUPPORT (OUTPATIENT)
Dept: REHABILITATION | Facility: HOSPITAL | Age: 28
End: 2018-12-06
Attending: PODIATRIST
Payer: COMMERCIAL

## 2018-12-06 DIAGNOSIS — M25.671 STIFFNESS OF ANKLE JOINT, RIGHT: ICD-10-CM

## 2018-12-06 DIAGNOSIS — M79.671 RIGHT FOOT PAIN: ICD-10-CM

## 2018-12-06 PROCEDURE — 97110 THERAPEUTIC EXERCISES: CPT

## 2018-12-06 PROCEDURE — 97140 MANUAL THERAPY 1/> REGIONS: CPT

## 2018-12-06 NOTE — PROGRESS NOTES
Physical Therapy Daily Treatment Note     [facility]  Clinic Number: [id number]    Therapy Diagnosis:   Encounter Diagnoses   Name Primary?    Right foot pain     Stiffness of ankle joint, right      Physician: Vamsi Estevez DPM    Visit Date: 12/6/2018    Physician Orders: PT Eval and Treat  Medical Diagnosis:   M79.671 (ICD-10-CM) - Right foot pain   G57.51 (ICD-10-CM) - Tarsal tunnel syndrome of right side   Evaluation Date: 10/17/2018  Authorization Period Expiration: 12-    Plan of Care Certification Period: 12-  Visit # / Visits authorized: 7/20    Precautions: Standard    Time In: 4:00 PM  Time Out: 5:10 PM  Total Billable Time: 40 minutes    Precautions: Standard    Subjective     Pt reports: that she had swelling the other day in her foot with no activity. She said she went to the MD and had an MRI performed. She will find out the results soon.    She was compliant with home exercise program.    Response to previous treatment: No adverse impact  Functional change:  Improved balance    Pain:  0/10  Location: right ankles     Objective     Safia received manual therapy to improve joint mobility and soft-tissue extensibility for 10 minutes to the R Ankle including:  - Side-Lying Lateral Calcaneal Arc Glide with Ankle DF Mobilization - Grade IV  - A/P Talar Glides - Grade IV  - Long-Axis Talocrural Distraction - Grade III    Safia received therapeutic exercises to develop strength, endurance, ROM and flexibility for 30 minutes including:  - Stationary Bike - 8 min. @ Lv. 5  - R Standing Gastroc Stretch on Slant Board - 3x30 sec.  - R Standing Soleus Stretch on Slant Board - 3x30 sec.  - R Single-Leg Heel Raises on Slant Board - 4x8  - R SLS on Airex w/ Rebounder Ball Toss Facing 3 Diff. Directions - 1x20 w/ Blue MedBall in Each Direction  - Alt. Fwd Walking Lunges - 2 Turf Lengths  - B Lateral Lunges onto Soft-Side of BOSU - 1x20 Each    Bethany received moist hot pack to  decrease pain, swelling, and improve recovery time for 10 minutes to R Ankle.    Home Exercises Provided and Patient Education Provided     Written Home Exercises Provided: yes.  Exercises were reviewed and Safia was able to demonstrate them prior to the end of the session.  Safia demonstrated good  understanding of the education provided.     See EMR under Media for exercises provided prior visit.    Assessment     - Improved quantity of motion with lateral arc glide mobilizations of the calcaneous with a softer end-feel and no pain compared to previous treatment sessions. The patient had no pain or signs of swelling today during her treatment session. The patient is demonstrating improved dynamic balance as she can  multiple planes with perturbations from the ball toss without a LOB. This is an activity the patient was previously unable to perform.     Safia is progressing well towards her goals.     Pt prognosis is Excellent.     Pt will continue to benefit from skilled outpatient physical therapy to address the deficits listed in the problem list box on initial evaluation, provide pt/family education and to maximize pt's level of independence in the home and community environment.     Pt's spiritual, cultural and educational needs considered and pt agreeable to plan of care and goals.    Anticipated barriers to physical therapy: None    Goals:   Short Term Goals: 4 weeks   1. Patient will demonstrate improved R Ankle AROM to: (Progressing, NOT MET)    Right   Dorsiflexion 10 degrees   Inversion 35 degrees   2. Patient will demonstrate improved balance ability by being able to perform SLS on R for 1 minute without boot and without increased range. (Progressing, NOT MET)  3. Patient will demonstrate improved walking ability by ambulating for at least 1 hour without the walking boot on and without increased pain. (Progressing, NOT MET)  4. Patient will demonstrate improved functional ability by  showing 40% or less limitation according to the FOTO. (Progressing, NOT MET)     Long Term Goals: 8 weeks   1. Patient will demonstrate improved R Ankle AROM to: (Progressing, NOT MET)    Right   Dorsiflexion 15 degrees   Inversion 40 degrees   2. Patient will demonstrate improved balance ability by being able to perform SLS on R for 2 minute without boot and without increased range. (Progressing, NOT MET)  3. Patient will demonstrate improved walking ability by ambulating for at least 3 hour without the walking boot on and without increased pain. (Progressing, NOT MET)  4. Patient will demonstrate improved functional ability by self-reporting being able to ride her bike for her normal distance/time without increased pain. (Progressing, NOT MET)  5. Patient will demonstrate improved functional ability by showing 20% or less limitation according to the FOTO. (Progressing, NOT MET)    Plan     Continue with Current POC. Focus on improving subtalar joint mobility as well as exercises promoting improved ankle stability.    George Montes, PT, DPT

## 2018-12-06 NOTE — TELEPHONE ENCOUNTER
----- Message from Vamsi Estevez DPM sent at 12/6/2018 11:16 AM CST -----  Please schedule patient an appointment for follow up next Wednesday.

## 2018-12-07 NOTE — PROGRESS NOTES
Physical Therapy Daily Treatment Note     Name: Safia [facility], [place]  Clinic Number: [id number]    Therapy Diagnosis:   Encounter Diagnoses   Name Primary?    Right foot pain     Stiffness of ankle joint, right      Physician: Vamsi Estevez DPM    Visit Date: 12/6/2018    Physician Orders: PT Eval and Treat  Medical Diagnosis:   M79.671 (ICD-10-CM) - Right foot pain   G57.51 (ICD-10-CM) - Tarsal tunnel syndrome of right side   Evaluation Date: 10/17/2018  Authorization Period Expiration: 12-    Plan of Care Certification Period: 12-  Visit # / Visits authorized: 7/20    Precautions: Standard    Time In: 4:00 PM  Time Out: 5:10 PM  Total Billable Time: 40 minutes    Precautions: Standard    Subjective     Pt reports: that she had swelling the other day in her foot with no activity. She said she went to the MD and had an MRI performed. She will find out the results soon.    She was compliant with home exercise program.    Response to previous treatment: No adverse impact  Functional change:  Improved balance    Pain:  0/10  Location: right ankles     Objective     Safia received manual therapy to improve joint mobility and soft-tissue extensibility for 10 minutes to the R Ankle including:  - Side-Lying Lateral Calcaneal Arc Glide with Ankle DF Mobilization - Grade IV  - A/P Talar Glides - Grade IV  - Long-Axis Talocrural Distraction - Grade III    Safia received therapeutic exercises to develop strength, endurance, ROM and flexibility for 30 minutes including:  - Stationary Bike - 8 min. @ Lv. 5  - R Standing Gastroc Stretch on Slant Board - 3x30 sec.  - R Standing Soleus Stretch on Slant Board - 3x30 sec.  - R Single-Leg Heel Raises on Slant Board - 4x8  - R SLS on Airex w/ Rebounder Ball Toss Facing 3 Diff. Directions - 1x20 w/ Blue MedBall in Each Direction  - Alt. Fwd Walking Lunges - 2 Turf Lengths  - B Lateral Lunges onto Soft-Side of BOSU - 1x20 Each    Bethany received moist hot pack to  decrease pain, swelling, and improve recovery time for 10 minutes to R Ankle.    Home Exercises Provided and Patient Education Provided     Written Home Exercises Provided: yes.  Exercises were reviewed and Safia was able to demonstrate them prior to the end of the session.  Safia demonstrated good  understanding of the education provided.     See EMR under Media for exercises provided prior visit.    Assessment     - Improved quantity of motion with lateral arc glide mobilizations of the calcaneous with a softer end-feel and no pain compared to previous treatment sessions. The patient had no pain or signs of swelling today during her treatment session. The patient is demonstrating improved dynamic balance as she can  multiple planes with perturbations from the ball toss without a LOB. This is an activity the patient was previously unable to perform.     Safia is progressing well towards her goals.     Pt prognosis is Excellent.     Pt will continue to benefit from skilled outpatient physical therapy to address the deficits listed in the problem list box on initial evaluation, provide pt/family education and to maximize pt's level of independence in the home and community environment.     Pt's spiritual, cultural and educational needs considered and pt agreeable to plan of care and goals.    Anticipated barriers to physical therapy: None    Goals:   Short Term Goals: 4 weeks   1. Patient will demonstrate improved R Ankle AROM to: (Progressing, NOT MET)    Right   Dorsiflexion 10 degrees   Inversion 35 degrees   2. Patient will demonstrate improved balance ability by being able to perform SLS on R for 1 minute without boot and without increased range. (Progressing, NOT MET)  3. Patient will demonstrate improved walking ability by ambulating for at least 1 hour without the walking boot on and without increased pain. (Progressing, NOT MET)  4. Patient will demonstrate improved functional ability by  showing 40% or less limitation according to the FOTO. (Progressing, NOT MET)     Long Term Goals: 8 weeks   1. Patient will demonstrate improved R Ankle AROM to: (Progressing, NOT MET)    Right   Dorsiflexion 15 degrees   Inversion 40 degrees   2. Patient will demonstrate improved balance ability by being able to perform SLS on R for 2 minute without boot and without increased range. (Progressing, NOT MET)  3. Patient will demonstrate improved walking ability by ambulating for at least 3 hour without the walking boot on and without increased pain. (Progressing, NOT MET)  4. Patient will demonstrate improved functional ability by self-reporting being able to ride her bike for her normal distance/time without increased pain. (Progressing, NOT MET)  5. Patient will demonstrate improved functional ability by showing 20% or less limitation according to the FOTO. (Progressing, NOT MET)    Plan     Continue with Current POC. Focus on improving subtalar joint mobility as well as exercises promoting improved ankle stability.    George Montes, PT, DPT

## 2018-12-08 ENCOUNTER — PATIENT MESSAGE (OUTPATIENT)
Dept: PODIATRY | Facility: CLINIC | Age: 28
End: 2018-12-08

## 2018-12-12 ENCOUNTER — OFFICE VISIT (OUTPATIENT)
Dept: PODIATRY | Facility: CLINIC | Age: 28
End: 2018-12-12
Payer: COMMERCIAL

## 2018-12-12 VITALS
BODY MASS INDEX: 24.75 KG/M2 | WEIGHT: 145 LBS | HEIGHT: 64 IN | DIASTOLIC BLOOD PRESSURE: 68 MMHG | SYSTOLIC BLOOD PRESSURE: 112 MMHG | HEART RATE: 85 BPM

## 2018-12-12 DIAGNOSIS — G57.51 TARSAL TUNNEL SYNDROME OF RIGHT SIDE: ICD-10-CM

## 2018-12-12 DIAGNOSIS — M79.671 RIGHT FOOT PAIN: Primary | ICD-10-CM

## 2018-12-12 DIAGNOSIS — Q79.8 ACCESSORY SKELETAL MUSCLE: ICD-10-CM

## 2018-12-12 PROCEDURE — 99213 OFFICE O/P EST LOW 20 MIN: CPT | Mod: 25,S$GLB,, | Performed by: PODIATRIST

## 2018-12-12 PROCEDURE — 3008F BODY MASS INDEX DOCD: CPT | Mod: CPTII,S$GLB,, | Performed by: PODIATRIST

## 2018-12-12 PROCEDURE — 64450 NJX AA&/STRD OTHER PN/BRANCH: CPT | Mod: RT,S$GLB,, | Performed by: PODIATRIST

## 2018-12-12 PROCEDURE — 99999 PR PBB SHADOW E&M-EST. PATIENT-LVL III: CPT | Mod: PBBFAC,,, | Performed by: PODIATRIST

## 2018-12-12 NOTE — PROGRESS NOTES
Subjective:      Patient ID: Safia Morales is a 28 y.o. female.    Chief Complaint: Follow-up (discuss MRI)    Intermittent pain, tingling, and feeling like spiders in arch right.  Gradual onset, not over past several weeks since a cycling race, aggravated by increased weight bearing, shoe gear, pressure. PT not doing much.  Denies trauma, surgery.  nsaid helps some.  xrays normal 10/1/18    PT relates some swelling with WB and Color change with change in palpability of pulses.  MRI shows accessory FHL muscle right tarsal tunnel  Review of Systems   Constitution: Negative for chills, diaphoresis, fever, malaise/fatigue and night sweats.   Cardiovascular: Negative for claudication, cyanosis, leg swelling and syncope.   Skin: Negative for color change, dry skin, nail changes, rash, suspicious lesions and unusual hair distribution.   Musculoskeletal: Negative for falls, joint pain, joint swelling, muscle cramps, muscle weakness and stiffness.   Gastrointestinal: Negative for constipation, diarrhea, nausea and vomiting.   Neurological: Positive for paresthesias. Negative for brief paralysis, disturbances in coordination, focal weakness, numbness, sensory change and tremors.           Objective:      Physical Exam   Constitutional: She is oriented to person, place, and time. She appears well-developed and well-nourished. She is cooperative. No distress.   Cardiovascular:   Pulses:       Popliteal pulses are 2+ on the right side, and 2+ on the left side.        Dorsalis pedis pulses are 2+ on the right side, and 2+ on the left side.        Posterior tibial pulses are 2+ on the right side, and 2+ on the left side.   Capillary refill 3 seconds all toes/distal feet, all toes/both feet warm to touch.      Negative lymphadenopathy bilateral popliteal fossa and tarsal tunnel.      Negavie lower extremity edema bilateral.     Musculoskeletal:        Right ankle: She exhibits normal range of motion, no swelling, no ecchymosis,  no deformity, no laceration and normal pulse. Achilles tendon normal. Achilles tendon exhibits no pain, no defect and normal Song's test results.   Ankle dorsiflexion decreased at <10 degrees bilateral with moderate increase with knee flexion bilateral.    Otherwise, Normal angle, base, station of gait. All ten toes without clubbing, cyanosis, or signs of ischemia.  No pain to palpation bilateral lower extremities.  Range of motion, stability, muscle strength, and muscle tone normal bilateral feet and legs.    Lymphadenopathy: No inguinal adenopathy noted on the right or left side.   Negative lymphadenopathy bilateral popliteal fossa and tarsal tunnel.    Negative lymphangitic streaking bilateral feet/ankles/legs.   Neurological: She is alert and oriented to person, place, and time. She has normal strength. She displays no atrophy and no tremor. No sensory deficit. She exhibits normal muscle tone. Gait normal.   Reflex Scores:       Patellar reflexes are 2+ on the right side and 2+ on the left side.       Achilles reflexes are 2+ on the right side and 2+ on the left side.  Negative tinel sign to percussion sural, superficial peroneal, deep peroneal, saphenous, and posterior tibial nerves right and left ankles and feet.     Skin: Skin is warm, dry and intact. Capillary refill takes 2 to 3 seconds. No abrasion, no bruising, no burn, no ecchymosis, no laceration, no lesion and no rash noted. She is not diaphoretic. No cyanosis or erythema. No pallor. Nails show no clubbing.     Skin is normal age and health appropriate color, turgor, texture, and temperature bilateral lower extremities without ulceration, hyperpigmentation, discoloration, masses nodules or cords palpated.  No ecchymosis, erythema, edema, or cardinal signs of infection bilateral lower extremities.     Psychiatric: She has a normal mood and affect.             Assessment:       Encounter Diagnoses   Name Primary?    Right foot pain Yes    Tarsal  tunnel syndrome of right side     Accessory skeletal muscle          Plan:       Safia was seen today for follow-up.    Diagnoses and all orders for this visit:    Right foot pain  -     ORTHOTIC DEVICE (DME)  -     Ambulatory consult to Physical Therapy    Tarsal tunnel syndrome of right side  -     ORTHOTIC DEVICE (DME)  -     Ambulatory consult to Physical Therapy    Accessory skeletal muscle  -     ORTHOTIC DEVICE (DME)  -     Ambulatory consult to Physical Therapy      I counseled the patient on her conditions, their implications and medical management.    Stop PT, fill  custom orthotics, continue nsaid prn.    Discussed conservative treatment with shoes of adequate dimensions, material, and style to alleviate symptoms and delay or prevent surgical intervention.    Fx boot, ambulate to tolerance weaning to shoes/inserts as symptoms allow.    Discussed that while these symptoms are frustrating, they do not seem acute or dangerous.    Counseled the patient on the potential complications of local injection of corticosteroid including, but not limited to:  Discoloration of skin, erosion of soft tissue, and increased likelihood of rupture of a soft tissue structure (ie. Plantar fascia, muscle, tendon, ligament, or capsule in the area of injection).  Patient indicates understanding of my explanation, that any questions have been answered to patient satisfaction, and patient gives verbal consent for injection of affected area.    After sterile skin prep, steroid injection was performed with patient verbal consent and timeout procedure with patient identifiers, site markings, and procedures in agreement to all present, at right posterior tibial nerve in tarsal tunnel using  4mg dexamethazone sodium phosphate, and 1mL 1% Lidocaine plain. This was well tolerated.                No Follow-up on file.

## 2018-12-13 ENCOUNTER — CLINICAL SUPPORT (OUTPATIENT)
Dept: REHABILITATION | Facility: HOSPITAL | Age: 28
End: 2018-12-13
Attending: PODIATRIST
Payer: COMMERCIAL

## 2018-12-13 DIAGNOSIS — M25.671 STIFFNESS OF ANKLE JOINT, RIGHT: ICD-10-CM

## 2018-12-13 DIAGNOSIS — M79.671 RIGHT FOOT PAIN: ICD-10-CM

## 2018-12-13 PROCEDURE — 97110 THERAPEUTIC EXERCISES: CPT

## 2018-12-13 NOTE — PROGRESS NOTES
Physical Therapy Daily Treatment Note     Name: Safia Morales  Clinic Number: [id number]    Therapy Diagnosis:   Encounter Diagnoses   Name Primary?    Right foot pain     Stiffness of ankle joint, right      Physician: Vamsi Estevez DPM    Visit Date: 12/6/2018    Physician Orders: PT Eval and Treat  Medical Diagnosis:   M79.671 (ICD-10-CM) - Right foot pain   G57.51 (ICD-10-CM) - Tarsal tunnel syndrome of right side   Evaluation Date: 10/17/2018  Authorization Period Expiration: 12-    Updated Plan of Care Certification Period: 01/18/2019  Visit # / Visits authorized: 8/20    Precautions: Standard    Time In: 5:00 PM  Time Out: 5:50 PM  Total Billable Time: 45 minutes    Precautions: Standard    Subjective     Pt reports: seeing MD yesterday, receiving cortizone injection yesterday. Discussed discharge vs continuing sessions. She reports some pain today in foot.    She was compliant with home exercise program.    Response to previous treatment: No adverse impact  Functional change:  Improved balance    Pain:  2/10  Location: right ankle    Objective     Safia received manual therapy to improve joint mobility and soft-tissue extensibility for 10 minutes to the R Ankle including: - NP  - Side-Lying Lateral Calcaneal Arc Glide with Ankle DF Mobilization - Grade IV  - A/P Talar Glides - Grade IV  - Long-Axis Talocrural Distraction - Grade III    Safia received therapeutic exercises to develop strength, endurance, ROM and flexibility for 45 minutes including:  - Stationary Bike - 10 min. @ Lv. 5  - R Standing Gastroc Stretch on Slant Board - 3x30 sec.  - Rockerboard medial/lateral anterior/posterior x20  - Rockerboard medial/lateral anterior/posterior balance x 60 seconds  - Leg press BLE 60# 2x15  - Leg press RLE only 40# x 15, plyo jumps x 15  - Single leg balance on trampoline x 10 seconds (too painful along incision, terminated)    Not Performed:  - R Standing Soleus Stretch on Slant Board - 3x30  sec.  - R Single-Leg Heel Raises on Slant Board - 4x8  - R SLS on Airex w/ Rebounder Ball Toss Facing 3 Diff. Directions - 1x20 w/ Blue MedBall in Each Direction  - Alt. Fwd Walking Lunges - 2 Turf Lengths  - B Lateral Lunges onto Soft-Side of BOSU - 1x20 Each    Bethany received cold pack to decrease pain, swelling, and improve recovery time for 10 minutes to R Ankle. - NP    Bethany received moist hot pack to decrease pain, swelling, and improve recovery time for 10 minutes to R Ankle. - NP    Ankle Active Range of Motion:     Right Left   Dorsiflexion 12 degrees 12 degrees   Plantarflexion 60 degrees 60 degrees   Inversion 50 degrees 42 degrees   Eversion 20 degrees 20 degrees     SLS: RLE (no boot) 2 minutes with no pain    Home Exercises Provided and Patient Education Provided     Written Home Exercises Provided: yes.  Exercises were reviewed and Safia was able to demonstrate them prior to the end of the session.  Safia demonstrated good  understanding of the education provided.     See EMR under Media for exercises provided prior visit.    Assessment     - Patient had cortizone injection yesterday, joint mobilizations were held today. Reported some pain where injection was during exercises. Patient demonstrated improved AROM with right ankle and single leg balance. Patient continues to have deficits and would benefit from continued PT at this time to address these. Requesting additional visits.    Safia is progressing well towards her goals.     Pt prognosis is Excellent.     Pt will continue to benefit from skilled outpatient physical therapy to address the deficits listed in the problem list box on initial evaluation, provide pt/family education and to maximize pt's level of independence in the home and community environment.     Pt's spiritual, cultural and educational needs considered and pt agreeable to plan of care and goals.    Anticipated barriers to physical therapy: None    Goals:   Short Term  Goals: 4 weeks   1. Patient will demonstrate improved R Ankle AROM to: (MET) 12 degrees DF, inversion 50 degrees    Right   Dorsiflexion 10 degrees   Inversion 35 degrees   2. Patient will demonstrate improved balance ability by being able to perform SLS on R for 1 minute without boot and without increased range. (MET)  3. Patient will demonstrate improved walking ability by ambulating for at least 1 hour without the walking boot on and without increased pain. (Progressing, NOT MET)  4. Patient will demonstrate improved functional ability by showing 40% or less limitation according to the FOTO. (Progressing, NOT MET)     Long Term Goals: 8 weeks   1. Patient will demonstrate improved R Ankle AROM to: (Progressing, NOT MET)    Right   Dorsiflexion 15 degrees   Inversion 40 degrees   2. Patient will demonstrate improved balance ability by being able to perform SLS on R for 2 minute without boot and without increased range. (MET)  3. Patient will demonstrate improved walking ability by ambulating for at least 3 hour without the walking boot on and without increased pain. (Progressing, NOT MET)  4. Patient will demonstrate improved functional ability by self-reporting being able to ride her bike for her normal distance/time without increased pain. (Progressing, NOT MET)  5. Patient will demonstrate improved functional ability by showing 20% or less limitation according to the FOTO. (Progressing, NOT MET)    Plan     Certification Period: 12/13/2018 to 01/18/2019  Recommended Treatment Plan: 1 times per week for 4 weeks: Gait Training, Manual Therapy, Moist Heat/ Ice, Neuromuscular Re-ed, Patient Education, Therapeutic Activites and Therapeutic Exercise  Other Recommendations: modalities prn, ASTYM prn, kinesiotape prn, Functional Dry Needling prn       Therapist: Shalini Montes, PT    I CERTIFY THE NEED FOR THESE SERVICES FURNISHED UNDER THIS PLAN OF TREATMENT AND WHILE UNDER MY CARE    Physician's comments:  ________________________________________________________________________________________________________________________________________________      Physician's Name: ___________________________________    Shalinicathy Montes, PT, DPT

## 2018-12-20 ENCOUNTER — CLINICAL SUPPORT (OUTPATIENT)
Dept: REHABILITATION | Facility: HOSPITAL | Age: 28
End: 2018-12-20
Attending: PODIATRIST
Payer: COMMERCIAL

## 2018-12-20 DIAGNOSIS — M79.671 RIGHT FOOT PAIN: ICD-10-CM

## 2018-12-20 DIAGNOSIS — M25.671 STIFFNESS OF ANKLE JOINT, RIGHT: ICD-10-CM

## 2018-12-20 PROCEDURE — 97110 THERAPEUTIC EXERCISES: CPT

## 2018-12-20 NOTE — PROGRESS NOTES
Physical Therapy Daily Treatment Note     Name: Safia Morales  Clinic Number: [id number]    Therapy Diagnosis:   Encounter Diagnoses   Name Primary?    Right foot pain     Stiffness of ankle joint, right      Physician: Vamsi Estevez DPM    Visit Date: 12/6/2018    Physician Orders: PT Eval and Treat  Medical Diagnosis:   M79.671 (ICD-10-CM) - Right foot pain   G57.51 (ICD-10-CM) - Tarsal tunnel syndrome of right side   Evaluation Date: 10/17/2018  Authorization Period Expiration: 12-    Updated Plan of Care Certification Period: 01/18/2019  Visit # / Visits authorized: 9/20    Precautions: Standard    Time In: 5:04 PM  Time Out: 6:01 PM  Total Billable Time: 54  minutes    Precautions: Standard    Subjective     Pt reports: that she hasn't had any pain and her swelling is actually starting to improve.    She was compliant with home exercise program.    Response to previous treatment: No adverse impact  Functional change:  Improved balance    Pain:  0/10  Location: right ankle    Objective     Safia received manual therapy to improve joint mobility and soft-tissue extensibility for 0 minutes to the R Ankle including:  BELOW NOT PERFORMED TODAY  - Side-Lying Lateral Calcaneal Arc Glide with Ankle DF Mobilization - Grade IV  - A/P Talar Glides - Grade IV  - Long-Axis Talocrural Distraction - Grade III    Safia received therapeutic exercises to develop strength, endurance, ROM and flexibility for 54 minutes including:  - Stationary Bike - 8 min. [place] @ Lv. 3  - R SLS w/ Multi-Directional Cone Touches - 2x10 Each Cone w/ 3000 Gram Blue Ball  - R Fwd Step-Ups on 12-inch Box - 3x10 w/ Blue MedBall  - R Cross-Over Step-Ups on 12-inch Box - 3x10  - Fwd Alt. Walking Lunges - 2 Turf Lengths  - B Standing Gastroc Stretch on Slant Board - 3x30 sec.  - B Standing Soleus Stretch on Slant Board - 3x30 sec.  - Toe Walking - 2 Turf Lengths  - Lateral Squat Walks - 2 Turf Lengths w/ Orange TB  BELOW NOT PERFORMED  TODAY  - R Standing Soleus Stretch on Slant Board - 3x30 sec.  - R Single-Leg Heel Raises on Slant Board - 4x8  - R SLS on Airex w/ Rebounder Ball Toss Facing 3 Diff. Directions - 1x20 w/ Blue MedBall in Each Direction  - Alt. Fwd Walking Lunges - 2 Turf Lengths  - B Lateral Lunges onto Soft-Side of BOSU - 1x20 Each  - Rockerboard medial/lateral anterior/posterior x20  - Rockerboard medial/lateral anterior/posterior balance x 60 seconds  - Leg Press BLE - 60# 2x15  - Leg Press RLE only - 1x15 @ 40#    Bethany received cold pack to decrease pain, swelling, and improve recovery time for 0 minutes to R Ankle. (NOT PERFORMED TODAY)    Bethany received moist hot pack to decrease pain, swelling, and improve recovery time for 0 minutes to R Ankle. (NOT PERFORMED TODAY)    Home Exercises Provided and Patient Education Provided     Written Home Exercises Provided: yes.  Exercises were reviewed and Safia was able to demonstrate them prior to the end of the session.  Safia demonstrated good  understanding of the education provided.     See EMR under Media for exercises provided prior visit.    Assessment     - Patient had no pain with any of her activities today. She also had no increases in swelling during her session, which has occurred in previous sessions. She does still have poor balance with dynamic activities and would benefit from continued exercises focusing on her balance.    Safia is progressing well towards her goals.     Pt prognosis is Excellent.     Pt will continue to benefit from skilled outpatient physical therapy to address the deficits listed in the problem list box on initial evaluation, provide pt/family education and to maximize pt's level of independence in the home and community environment.     Pt's spiritual, cultural and educational needs considered and pt agreeable to plan of care and goals.    Anticipated barriers to physical therapy: None    Goals:   Short Term Goals: 4 weeks   1. Patient  will demonstrate improved R Ankle AROM to: (MET) 12 degrees DF, inversion 50 degrees    Right   Dorsiflexion 10 degrees   Inversion 35 degrees   2. Patient will demonstrate improved balance ability by being able to perform SLS on R for 1 minute without boot and without increased range. (MET)  3. Patient will demonstrate improved walking ability by ambulating for at least 1 hour without the walking boot on and without increased pain. (Progressing, NOT MET)  4. Patient will demonstrate improved functional ability by showing 40% or less limitation according to the FOTO. (Progressing, NOT MET)     Long Term Goals: 8 weeks   1. Patient will demonstrate improved R Ankle AROM to: (Progressing, NOT MET)    Right   Dorsiflexion 15 degrees   Inversion 40 degrees   2. Patient will demonstrate improved balance ability by being able to perform SLS on R for 2 minute without boot and without increased range. (MET)  3. Patient will demonstrate improved walking ability by ambulating for at least 3 hour without the walking boot on and without increased pain. (Progressing, NOT MET)  4. Patient will demonstrate improved functional ability by self-reporting being able to ride her bike for her normal distance/time without increased pain. (Progressing, NOT MET)  5. Patient will demonstrate improved functional ability by showing 20% or less limitation according to the FOTO. (Progressing, NOT MET)    Plan     Continue with current POC. Continue to progress balance activities and ankle stability activities.     George Montes, PT, DPT

## 2018-12-26 ENCOUNTER — CLINICAL SUPPORT (OUTPATIENT)
Dept: REHABILITATION | Facility: HOSPITAL | Age: 28
End: 2018-12-26
Attending: PODIATRIST
Payer: COMMERCIAL

## 2018-12-26 DIAGNOSIS — M79.671 RIGHT FOOT PAIN: ICD-10-CM

## 2018-12-26 DIAGNOSIS — M25.671 STIFFNESS OF ANKLE JOINT, RIGHT: ICD-10-CM

## 2018-12-26 PROCEDURE — 97140 MANUAL THERAPY 1/> REGIONS: CPT

## 2018-12-26 PROCEDURE — 97110 THERAPEUTIC EXERCISES: CPT

## 2018-12-26 NOTE — PROGRESS NOTES
Physical Therapy Daily Treatment Note     Name: Safia Morales  Clinic Number: [id number]    Therapy Diagnosis:   Encounter Diagnoses   Name Primary?    Right foot pain     Stiffness of ankle joint, right      Physician: Vamsi Estevez DPM    Visit Date: 12/6/2018    Physician Orders: PT Eval and Treat  Medical Diagnosis:   M79.671 (ICD-10-CM) - Right foot pain   G57.51 (ICD-10-CM) - Tarsal tunnel syndrome of right side   Evaluation Date: 10/17/2018  Authorization Period Expiration: 12-    Updated Plan of Care Certification Period: 01/18/2019  Visit # / Visits authorized: 10/20    Precautions: Standard    Time In: 4:00pm  Time Out: 5:10pm  Total Billable Time: 60  minutes    Precautions: Standard    Subjective     Pt reports: continued pain and swelling again. She states little improvement in pain levels since start of therapy.    She was compliant with home exercise program.    Response to previous treatment: No adverse impact  Functional change:  Improved balance    Pain:  0/10  Location: right ankle    Objective     Safia received manual therapy to improve joint mobility and soft-tissue extensibility for 10 minutes to the R Ankle including:  - A/P Talar Glides - Grade IV  - Long-Axis Talocrural Distraction - Grade III  - RLE nerve flossing    Safia received therapeutic exercises to develop strength, endurance, ROM and flexibility for 50 minutes including:  - Stationary Bike - 10 min  - B Standing Gastroc Stretch on Slant Board - 3x30 sec.  - B Standing Soleus Stretch on Slant Board - 3x30 sec.  - B SLR 3x15  - B sidelying hip abduction 3x15  - B sidelying hip adduction 3x15    BELOW NOT PERFORMED TODAY  - R SLS w/ Multi-Directional Cone Touches - 2x10 Each Cone w/ 3000 Gram Blue Ball  - R Fwd Step-Ups on 12-inch Box - 3x10 w/ Blue MedBall  - R Cross-Over Step-Ups on 12-inch Box - 3x10  - Fwd Alt. Walking Lunges - 2 Turf Lengths  - Toe Walking - 2 Turf Lengths  - Lateral Squat Walks - 2 Turf Lengths  w/ [place] TB  - R Standing Soleus Stretch on Slant Board - 3x30 sec.  - R Single-Leg Heel Raises on Slant Board - 4x8  - R SLS on Airex w/ Rebounder Ball Toss Facing 3 Diff. Directions - 1x20 w/ Blue MedBall in Each Direction  - Alt. Fwd Walking Lunges - 2 Turf Lengths  - B Lateral Lunges onto Soft-Side of BOSU - 1x20 Each  - Rockerboard medial/lateral anterior/posterior x20  - Rockerboard medial/lateral anterior/posterior balance x 60 seconds  - Leg Press BLE - 60# 2x15  - Leg Press RLE only - 1x15 @ 40#    Bethany received cold pack to decrease pain, swelling, and improve recovery time for 10 minutes to R Ankle.    Bethany received moist hot pack to decrease pain, swelling, and improve recovery time for 0 minutes to R Ankle. (NOT PERFORMED TODAY)    FOTO: 32% on 12/26/2018    Home Exercises Provided and Patient Education Provided     Written Home Exercises Provided: yes.  Exercises were reviewed and Safia was able to demonstrate them prior to the end of the session.  Safia demonstrated good  understanding of the education provided.     See EMR under Media for exercises provided prior visit.    Assessment     - Patient had pain infrequently during session. She wants to follow-up with MD before next session stating she has not felt much improvement since start of evaluation.    Safia is progressing well towards her goals.     Pt prognosis is Excellent.     Pt will continue to benefit from skilled outpatient physical therapy to address the deficits listed in the problem list box on initial evaluation, provide pt/family education and to maximize pt's level of independence in the home and community environment.     Pt's spiritual, cultural and educational needs considered and pt agreeable to plan of care and goals.    Anticipated barriers to physical therapy: None    Goals:   Short Term Goals: 4 weeks   1. Patient will demonstrate improved R Ankle AROM to: (MET) 12 degrees DF, inversion 50 degrees    Right    Dorsiflexion 10 degrees   Inversion 35 degrees   2. Patient will demonstrate improved balance ability by being able to perform SLS on R for 1 minute without boot and without increased range. (MET)  3. Patient will demonstrate improved walking ability by ambulating for at least 1 hour without the walking boot on and without increased pain. (Progressing, NOT MET)  4. Patient will demonstrate improved functional ability by showing 40% or less limitation according to the FOTO. (MET)     Long Term Goals: 8 weeks   1. Patient will demonstrate improved R Ankle AROM to: (Progressing, NOT MET)    Right   Dorsiflexion 15 degrees   Inversion 40 degrees   2. Patient will demonstrate improved balance ability by being able to perform SLS on R for 2 minute without boot and without increased range. (MET)  3. Patient will demonstrate improved walking ability by ambulating for at least 3 hour without the walking boot on and without increased pain. (Progressing, NOT MET)  4. Patient will demonstrate improved functional ability by self-reporting being able to ride her bike for her normal distance/time without increased pain. (Progressing, NOT MET)  5. Patient will demonstrate improved functional ability by showing 20% or less limitation according to the FOTO. (Progressing, NOT MET)    Plan     Continue with current POC. Patient wants to follow-up with MD before next session.     Shalini Montes, PT, DPT

## 2019-01-10 ENCOUNTER — PATIENT MESSAGE (OUTPATIENT)
Dept: PODIATRY | Facility: CLINIC | Age: 29
End: 2019-01-10

## 2019-08-28 ENCOUNTER — PATIENT MESSAGE (OUTPATIENT)
Dept: FAMILY MEDICINE | Facility: CLINIC | Age: 29
End: 2019-08-28

## 2019-08-30 ENCOUNTER — PATIENT MESSAGE (OUTPATIENT)
Dept: UROGYNECOLOGY | Facility: CLINIC | Age: 29
End: 2019-08-30

## 2019-08-30 ENCOUNTER — TELEPHONE (OUTPATIENT)
Dept: UROGYNECOLOGY | Facility: CLINIC | Age: 29
End: 2019-08-30

## 2019-08-30 NOTE — TELEPHONE ENCOUNTER
Called pt to schedule an apt, no answer, Left voice message for pt to give the office a call back at [phone].

## 2019-09-03 ENCOUNTER — OFFICE VISIT (OUTPATIENT)
Dept: UROGYNECOLOGY | Facility: CLINIC | Age: 29
End: 2019-09-03
Payer: COMMERCIAL

## 2019-09-03 VITALS
SYSTOLIC BLOOD PRESSURE: 110 MMHG | HEIGHT: 63 IN | BODY MASS INDEX: 25.86 KG/M2 | WEIGHT: 145.94 LBS | DIASTOLIC BLOOD PRESSURE: 70 MMHG

## 2019-09-03 DIAGNOSIS — Z01.419 WELL WOMAN EXAM: Primary | ICD-10-CM

## 2019-09-03 DIAGNOSIS — R23.2 HOT FLASHES: ICD-10-CM

## 2019-09-03 PROCEDURE — 99999 PR PBB SHADOW E&M-EST. PATIENT-LVL III: CPT | Mod: PBBFAC,,, | Performed by: NURSE PRACTITIONER

## 2019-09-03 PROCEDURE — 99395 PR PREVENTIVE VISIT,EST,18-39: ICD-10-PCS | Mod: S$GLB,,, | Performed by: NURSE PRACTITIONER

## 2019-09-03 PROCEDURE — 99395 PREV VISIT EST AGE 18-39: CPT | Mod: S$GLB,,, | Performed by: NURSE PRACTITIONER

## 2019-09-03 PROCEDURE — 99999 PR PBB SHADOW E&M-EST. PATIENT-LVL III: ICD-10-PCS | Mod: PBBFAC,,, | Performed by: NURSE PRACTITIONER

## 2019-09-03 NOTE — PROGRESS NOTES
"2019    SUBJECTIVE:   29 y.o. female for annual exam.  Complains of weight loss, "gray spots" in her vision (in past diagnosed with ocular migraines) and hot flashes.    History reviewed. No pertinent past medical history.    Past Surgical History:   Procedure Laterality Date    STRABISMUS SURGERY      x 2    WISDOM TOOTH EXTRACTION         No current outpatient medications on file.     No current facility-administered medications for this visit.      Allergies: Latex, natural rubber   Patient's last menstrual period was 2019.       Regular periods- last 3-4 days.  Around every 28 days      Well Woman:  Pap:2018 normal, negative HPV        Family History  Family History   Problem Relation Age of Onset    Diabetes Paternal Uncle     Cancer Paternal Uncle     Diabetes Maternal Grandfather     No Known Problems Mother     No Known Problems Father     No Known Problems Brother       Colon CA: No  Breast CA: No  GYN CA: No   CA: No      ROS:  Feeling well.   No dyspnea or chest pain on exertion.    No abdominal pain, change in bowel habits, black or bloody stools.    No urinary tract symptoms.   GYN ROS: normal menses, no abnormal bleeding, pelvic pain or discharge, no breast pain or new or enlarging lumps on self exam. Complains of weight loss, "gray spots" in her vision (in past diagnosed with ocular migraines) and hot flashes.  No neurological complaints.    OBJECTIVE:   The patient appears well, alert, oriented x 3, in no distress.  /70 (BP Location: Left arm, Patient Position: Sitting, BP Method: Medium (Manual))   Ht 5' 3" (1.6 m)   Wt 66.2 kg (145 lb 15.1 oz)   LMP 2019   BMI 25.85 kg/m²   ENT normal.  Neck supple. No adenopathy, thyroid is enlarged. RY.   Lungs are clear, good air entry, no wheezes, rhonchi or rales.   S1 and S2 normal, no murmurs, regular rate and rhythm.   Abdomen soft without tenderness, guarding, mass or organomegaly.   Extremities show no " edema, normal peripheral pulses.   Neurological is normal, no focal findings.    BREAST EXAM: breasts appear normal, no suspicious masses, no skin or nipple changes or axillary nodes    PELVIC EXAM:   VULVA: normal appearing vulva with no masses, tenderness or lesions,   VAGINA: normal appearing vagina with normal color and discharge, no lesions,  CERVIX: normal appearing cervix without discharge or lesions,   UTERUS: uterus is normal size, shape, consistency and nontender,   ADNEXA: no masses,   RECTAL: deferred    ASSESSMENT:   1. Well woman exam     2. Hot flashes         PLAN:     1. Well woman  --up to date    2. Hot flashes  --labs ordered    3.  RTC 1 year for annual      30 minutes were spent in face to face time with this patient  90 % of this time was spent in counseling and/or coordination of care  Lolis PINA [facility]  Division of Female Pelvic Medicine and Reconstructive Surgery  Department of Obstetrics & Gynecology

## 2019-09-04 ENCOUNTER — PATIENT MESSAGE (OUTPATIENT)
Dept: FAMILY MEDICINE | Facility: CLINIC | Age: 29
End: 2019-09-04

## 2019-09-05 ENCOUNTER — LAB VISIT (OUTPATIENT)
Dept: LAB | Facility: HOSPITAL | Age: 29
End: 2019-09-05
Payer: COMMERCIAL

## 2019-09-05 DIAGNOSIS — R23.2 HOT FLASHES: ICD-10-CM

## 2019-09-05 LAB
ALBUMIN SERPL BCP-MCNC: 4.2 G/DL (ref 3.5–5.2)
ALP SERPL-CCNC: 74 U/L (ref 55–135)
ALT SERPL W/O P-5'-P-CCNC: 21 U/L (ref 10–44)
ANION GAP SERPL CALC-SCNC: 9 MMOL/L (ref 8–16)
AST SERPL-CCNC: 19 U/L (ref 10–40)
BILIRUB SERPL-MCNC: 1.1 MG/DL (ref 0.1–1)
BUN SERPL-MCNC: 8 MG/DL (ref 6–20)
CALCIUM SERPL-MCNC: 9.8 MG/DL (ref 8.7–10.5)
CHLORIDE SERPL-SCNC: 105 MMOL/L (ref 95–110)
CHOLEST SERPL-MCNC: 218 MG/DL (ref 120–199)
CHOLEST/HDLC SERPL: 4.6 {RATIO} (ref 2–5)
CO2 SERPL-SCNC: 27 MMOL/L (ref 23–29)
CREAT SERPL-MCNC: 1 MG/DL (ref 0.5–1.4)
EST. GFR  (AFRICAN AMERICAN): >60 ML/MIN/1.73 M^2
EST. GFR  (NON AFRICAN AMERICAN): >60 ML/MIN/1.73 M^2
GLUCOSE SERPL-MCNC: 87 MG/DL (ref 70–110)
HDLC SERPL-MCNC: 47 MG/DL (ref 40–75)
HDLC SERPL: 21.6 % (ref 20–50)
LDLC SERPL CALC-MCNC: 145.4 MG/DL (ref 63–159)
LH SERPL-ACNC: 14.7 MIU/ML
NONHDLC SERPL-MCNC: 171 MG/DL
POTASSIUM SERPL-SCNC: 4 MMOL/L (ref 3.5–5.1)
PROT SERPL-MCNC: 7.6 G/DL (ref 6–8.4)
SODIUM SERPL-SCNC: 141 MMOL/L (ref 136–145)
T4 FREE SERPL-MCNC: 0.98 NG/DL (ref 0.71–1.51)
TRIGL SERPL-MCNC: 128 MG/DL (ref 30–150)
TSH SERPL DL<=0.005 MIU/L-ACNC: 1.69 UIU/ML (ref 0.4–4)

## 2019-09-05 PROCEDURE — 84439 ASSAY OF FREE THYROXINE: CPT

## 2019-09-05 PROCEDURE — 80061 LIPID PANEL: CPT

## 2019-09-05 PROCEDURE — 84443 ASSAY THYROID STIM HORMONE: CPT

## 2019-09-05 PROCEDURE — 36415 COLL VENOUS BLD VENIPUNCTURE: CPT

## 2019-09-05 PROCEDURE — 83002 ASSAY OF GONADOTROPIN (LH): CPT

## 2019-09-05 PROCEDURE — 80053 COMPREHEN METABOLIC PANEL: CPT

## 2019-10-02 ENCOUNTER — PATIENT MESSAGE (OUTPATIENT)
Dept: UROGYNECOLOGY | Facility: CLINIC | Age: 29
End: 2019-10-02

## 2019-10-25 ENCOUNTER — PATIENT MESSAGE (OUTPATIENT)
Dept: INTERNAL MEDICINE | Facility: CLINIC | Age: 29
End: 2019-10-25

## 2019-10-28 ENCOUNTER — PATIENT MESSAGE (OUTPATIENT)
Dept: FAMILY MEDICINE | Facility: CLINIC | Age: 29
End: 2019-10-28

## 2019-11-04 ENCOUNTER — CLINICAL SUPPORT (OUTPATIENT)
Dept: INTERNAL MEDICINE | Facility: CLINIC | Age: 29
End: 2019-11-04
Payer: COMMERCIAL

## 2019-11-04 ENCOUNTER — OFFICE VISIT (OUTPATIENT)
Dept: INTERNAL MEDICINE | Facility: CLINIC | Age: 29
End: 2019-11-04
Attending: FAMILY MEDICINE
Payer: COMMERCIAL

## 2019-11-04 VITALS
OXYGEN SATURATION: 98 % | HEART RATE: 83 BPM | WEIGHT: 143.31 LBS | TEMPERATURE: 98 F | DIASTOLIC BLOOD PRESSURE: 64 MMHG | HEIGHT: 64 IN | BODY MASS INDEX: 24.46 KG/M2 | SYSTOLIC BLOOD PRESSURE: 102 MMHG

## 2019-11-04 DIAGNOSIS — M22.2X1 PATELLOFEMORAL PAIN SYNDROME OF BOTH KNEES: ICD-10-CM

## 2019-11-04 DIAGNOSIS — M22.2X2 PATELLOFEMORAL PAIN SYNDROME OF BOTH KNEES: ICD-10-CM

## 2019-11-04 DIAGNOSIS — Z00.00 ROUTINE GENERAL MEDICAL EXAMINATION AT A HEALTH CARE FACILITY: Primary | ICD-10-CM

## 2019-11-04 DIAGNOSIS — Z00.00 ANNUAL PHYSICAL EXAM: Primary | ICD-10-CM

## 2019-11-04 PROBLEM — M79.671 RIGHT FOOT PAIN: Status: RESOLVED | Noted: 2018-10-17 | Resolved: 2019-11-04

## 2019-11-04 LAB
ALBUMIN SERPL BCP-MCNC: 4 G/DL (ref 3.5–5.2)
ALP SERPL-CCNC: 68 U/L (ref 55–135)
ALT SERPL W/O P-5'-P-CCNC: 29 U/L (ref 10–44)
ANION GAP SERPL CALC-SCNC: 7 MMOL/L (ref 8–16)
AST SERPL-CCNC: 22 U/L (ref 10–40)
BILIRUB SERPL-MCNC: 0.9 MG/DL (ref 0.1–1)
BUN SERPL-MCNC: 6 MG/DL (ref 6–20)
CALCIUM SERPL-MCNC: 9.1 MG/DL (ref 8.7–10.5)
CHLORIDE SERPL-SCNC: 105 MMOL/L (ref 95–110)
CHOLEST SERPL-MCNC: 208 MG/DL (ref 120–199)
CHOLEST/HDLC SERPL: 4.8 {RATIO} (ref 2–5)
CO2 SERPL-SCNC: 28 MMOL/L (ref 23–29)
CREAT SERPL-MCNC: 0.9 MG/DL (ref 0.5–1.4)
ERYTHROCYTE [DISTWIDTH] IN BLOOD BY AUTOMATED COUNT: 11.8 % (ref 11.5–14.5)
EST. GFR  (AFRICAN AMERICAN): >60 ML/MIN/1.73 M^2
EST. GFR  (NON AFRICAN AMERICAN): >60 ML/MIN/1.73 M^2
ESTIMATED AVG GLUCOSE: 97 MG/DL (ref 68–131)
GLUCOSE SERPL-MCNC: 89 MG/DL (ref 70–110)
HBA1C MFR BLD HPLC: 5 % (ref 4–5.6)
HCT VFR BLD AUTO: 43.8 % (ref 37–48.5)
HDLC SERPL-MCNC: 43 MG/DL (ref 40–75)
HDLC SERPL: 20.7 % (ref 20–50)
HGB BLD-MCNC: 14.7 G/DL (ref 12–16)
HIV 1+2 AB+HIV1 P24 AG SERPL QL IA: NEGATIVE
LDLC SERPL CALC-MCNC: 136.6 MG/DL (ref 63–159)
MCH RBC QN AUTO: 30.8 PG (ref 27–31)
MCHC RBC AUTO-ENTMCNC: 33.6 G/DL (ref 32–36)
MCV RBC AUTO: 92 FL (ref 82–98)
NONHDLC SERPL-MCNC: 165 MG/DL
PLATELET # BLD AUTO: 291 K/UL (ref 150–350)
PMV BLD AUTO: 10.8 FL (ref 9.2–12.9)
POTASSIUM SERPL-SCNC: 4 MMOL/L (ref 3.5–5.1)
PROT SERPL-MCNC: 7.1 G/DL (ref 6–8.4)
RBC # BLD AUTO: 4.77 M/UL (ref 4–5.4)
SODIUM SERPL-SCNC: 140 MMOL/L (ref 136–145)
TRIGL SERPL-MCNC: 142 MG/DL (ref 30–150)
WBC # BLD AUTO: 5.67 K/UL (ref 3.9–12.7)

## 2019-11-04 PROCEDURE — 99999 PR PBB SHADOW E&M-EST. PATIENT-LVL IV: ICD-10-PCS | Mod: PBBFAC,,, | Performed by: FAMILY MEDICINE

## 2019-11-04 PROCEDURE — 80053 COMPREHEN METABOLIC PANEL: CPT

## 2019-11-04 PROCEDURE — 99385 PR PREVENTIVE VISIT,NEW,18-39: ICD-10-PCS | Mod: S$GLB,,, | Performed by: FAMILY MEDICINE

## 2019-11-04 PROCEDURE — 99999 PR PBB SHADOW E&M-EST. PATIENT-LVL IV: CPT | Mod: PBBFAC,,, | Performed by: FAMILY MEDICINE

## 2019-11-04 PROCEDURE — 85027 COMPLETE CBC AUTOMATED: CPT

## 2019-11-04 PROCEDURE — 83036 HEMOGLOBIN GLYCOSYLATED A1C: CPT

## 2019-11-04 PROCEDURE — 99385 PREV VISIT NEW AGE 18-39: CPT | Mod: S$GLB,,, | Performed by: FAMILY MEDICINE

## 2019-11-04 PROCEDURE — 86703 HIV-1/HIV-2 1 RESULT ANTBDY: CPT

## 2019-11-04 PROCEDURE — 36415 COLL VENOUS BLD VENIPUNCTURE: CPT

## 2019-11-04 PROCEDURE — 80061 LIPID PANEL: CPT

## 2019-11-04 NOTE — PROGRESS NOTES
Subjective:       Patient ID: Safia Morales is a 29 y.o. female.    Chief Complaint: Executive Health    New patient to establish care and annual wellness check, physical exam.  P, S, Fm, Soc Hx's; Meds, allergies reviewed and reconciled.  Health maintenance file reviewed and addressed items due.    Review of Systems   Constitutional: Negative for chills, fatigue, fever and unexpected weight change.   HENT: Negative for congestion and trouble swallowing.    Eyes: Negative for redness and visual disturbance.   Respiratory: Negative for cough, chest tightness and shortness of breath.    Cardiovascular: Negative for chest pain, palpitations and leg swelling.   Gastrointestinal: Negative for abdominal pain and blood in stool.   Genitourinary: Negative for difficulty urinating, hematuria and menstrual problem.   Musculoskeletal: Positive for arthralgias. Negative for back pain, gait problem, joint swelling, myalgias and neck pain.   Skin: Negative for color change and rash.   Neurological: Negative for tremors, speech difficulty, weakness, numbness and headaches.   Hematological: Negative for adenopathy. Does not bruise/bleed easily.   Psychiatric/Behavioral: Negative for behavioral problems, confusion and sleep disturbance. The patient is not nervous/anxious.        Objective:      Physical Exam   Constitutional: She is oriented to person, place, and time. She appears well-developed and well-nourished.   HENT:   Head: Normocephalic.   Right Ear: Tympanic membrane, external ear and ear canal normal.   Left Ear: Tympanic membrane, external ear and ear canal normal.   Mouth/Throat: Oropharynx is clear and moist.   Eyes: Pupils are equal, round, and reactive to light. No scleral icterus.   Neck: Normal range of motion. Neck supple. Carotid bruit is not present. No thyromegaly present.   Cardiovascular: Normal rate, regular rhythm, normal heart sounds and intact distal pulses. Exam reveals no gallop and no friction rub.    No murmur heard.  Pulmonary/Chest: Effort normal and breath sounds normal. She exhibits no tenderness.   Abdominal: Soft. She exhibits no distension and no mass. There is no tenderness. There is no rebound and no guarding.   Musculoskeletal: Normal range of motion. She exhibits no edema or tenderness.        Right knee: She exhibits normal range of motion. No tenderness found.        Left knee: She exhibits normal range of motion. No tenderness found.   Lymphadenopathy:     She has no cervical adenopathy.   Neurological: She is alert and oriented to person, place, and time. She has normal strength. She displays normal reflexes. No cranial nerve deficit or sensory deficit. Coordination and gait normal.   Skin: Skin is warm and dry. No rash noted.   Psychiatric: She has a normal mood and affect. Her behavior is normal. Judgment and thought content normal.   Nursing note and vitals reviewed.      Assessment:       1. Annual physical exam    2. Patellofemoral pain syndrome of both knees        Plan:     Safia was seen today for Pending sale to [facility].    Diagnoses and all orders for this visit:    Annual physical exam    Patellofemoral pain syndrome of both knees      See meds, orders, follow up, routing and instructions sections of encounter.    Discussed diet and exercise discussed and links provided on AVS for detailed information.

## 2019-11-05 ENCOUNTER — OFFICE VISIT (OUTPATIENT)
Dept: FAMILY MEDICINE | Facility: CLINIC | Age: 29
End: 2019-11-05
Attending: FAMILY MEDICINE
Payer: COMMERCIAL

## 2019-11-05 ENCOUNTER — PATIENT MESSAGE (OUTPATIENT)
Dept: FAMILY MEDICINE | Facility: CLINIC | Age: 29
End: 2019-11-05

## 2019-11-05 VITALS
SYSTOLIC BLOOD PRESSURE: 100 MMHG | HEART RATE: 82 BPM | OXYGEN SATURATION: 99 % | HEIGHT: 63 IN | DIASTOLIC BLOOD PRESSURE: 60 MMHG | BODY MASS INDEX: 25.18 KG/M2 | WEIGHT: 142.13 LBS

## 2019-11-05 DIAGNOSIS — F84.5 ASPERGER'S SYNDROME: ICD-10-CM

## 2019-11-05 DIAGNOSIS — F41.8 ANXIETY WITH DEPRESSION: ICD-10-CM

## 2019-11-05 PROCEDURE — 99214 OFFICE O/P EST MOD 30 MIN: CPT | Mod: S$GLB,,, | Performed by: FAMILY MEDICINE

## 2019-11-05 PROCEDURE — 99214 PR OFFICE/OUTPT VISIT, EST, LEVL IV, 30-39 MIN: ICD-10-PCS | Mod: S$GLB,,, | Performed by: FAMILY MEDICINE

## 2019-11-05 PROCEDURE — 99999 PR PBB SHADOW E&M-EST. PATIENT-LVL III: ICD-10-PCS | Mod: PBBFAC,,, | Performed by: FAMILY MEDICINE

## 2019-11-05 PROCEDURE — 3008F PR BODY MASS INDEX (BMI) DOCUMENTED: ICD-10-PCS | Mod: CPTII,S$GLB,, | Performed by: FAMILY MEDICINE

## 2019-11-05 PROCEDURE — 99999 PR PBB SHADOW E&M-EST. PATIENT-LVL III: CPT | Mod: PBBFAC,,, | Performed by: FAMILY MEDICINE

## 2019-11-05 PROCEDURE — 3008F BODY MASS INDEX DOCD: CPT | Mod: CPTII,S$GLB,, | Performed by: FAMILY MEDICINE

## 2019-11-05 RX ORDER — SERTRALINE HYDROCHLORIDE 25 MG/1
25 TABLET, FILM COATED ORAL DAILY
Qty: 30 TABLET | Refills: 1 | Status: SHIPPED | OUTPATIENT
Start: 2019-11-05 | End: 2019-12-18 | Stop reason: SDUPTHER

## 2019-11-05 RX ORDER — ALPRAZOLAM 0.25 MG/1
0.25 TABLET ORAL 3 TIMES DAILY PRN
Qty: 10 TABLET | Refills: 0 | Status: SHIPPED | OUTPATIENT
Start: 2019-11-05 | End: 2023-07-12

## 2019-11-05 NOTE — PROGRESS NOTES
"Subjective:       Patient ID: Safia Morales is a 29 y.o. female.    Chief Complaint: Panic Attack    Anxiety   Presents for initial visit. Onset was at an unknown time. The problem has been gradually worsening. Symptoms include decreased concentration, depressed mood, excessive worry, insomnia, irritability, malaise, nervous/anxious behavior and panic (one attack). Patient reports no chest pain, confusion, dizziness, hyperventilation, nausea, palpitations or shortness of breath. Symptoms occur most days. The severity of symptoms is severe. Nothing aggravates the symptoms. The quality of sleep is poor. Nighttime awakenings: one to two.     There is no history of anemia, anxiety/panic attacks, bipolar disorder, depression or hyperthyroidism. (Diagnosed with Asperger's at age 9) Past treatments include SSRIs and counseling (CBT). Compliance with prior treatments has been good.     She was recently informed by her mother that she was diagnosed with Asperger's syndrome at age 9.  She remembers being in counseling.  She states that "this now explains so much."    She is now seeing aRchell Atkins.    Patient Active Problem List   Diagnosis    Patellofemoral pain syndrome of both knees    Mass    Stiffness of ankle joint, right    Asperger's syndrome    Anxiety with depression     No current outpatient medications on file.    The following portions of the patient's history were reviewed and updated as appropriate: allergies, past family history, past medical history, past social history and past surgical history.    Review of Systems   Constitutional: Positive for activity change and irritability. Negative for unexpected weight change.   HENT: Negative for hearing loss, rhinorrhea and trouble swallowing.    Eyes: Positive for visual disturbance. Negative for discharge.   Respiratory: Negative for chest tightness, shortness of breath and wheezing.    Cardiovascular: Negative for chest pain and palpitations. "   Gastrointestinal: Negative for blood in stool, constipation, diarrhea, nausea and vomiting.   Endocrine: Negative for polydipsia and polyuria.   Genitourinary: Negative for difficulty urinating, dysuria, hematuria and menstrual problem.   Musculoskeletal: Positive for arthralgias. Negative for joint swelling and neck pain.   Neurological: Negative for dizziness, weakness and headaches.   Psychiatric/Behavioral: Positive for decreased concentration and dysphoric mood. Negative for confusion. The patient is nervous/anxious and has insomnia.        PHQ-9 Questionnaire    Over the last two weeks, how often have you been bothered by the following problems:  0 Not at all   1 Several days  2 More than half the days  3 Nearly every day    Little interest or pleasure in doing things  1    Feeling down, depressed, or hopeless  3    Trouble falling or staying asleep, or sleeping too much  3    Feeling tired or having little energy  3    Poor appetite or overeating  3    Feeling bad about yourself -- or that you are a failure or  have let yourself or your family down  3    Trouble concentrating on things, such as reading the  newspaper or watching television  0    Moving or speaking so slowly that other people could  have noticed? Or the opposite -- being so fidgety or   restless that you have been moving around a lot   more than usual 3    Thoughts that you would be better off dead   or of hurtingyourself in some way  0      Total Score: 19  Symptom Count: 6    Total Score Depression Severity  1-4  Minimal depression  5-9  Mild depression  10-14  Moderate depression  15-19  Moderately severe depression  20-27  Severe depression      MICHELLE-7 Questionnaire    Feeling nervous, anxious, or on edge 3    Not being able to stop or control worrying  3    Worrying too much about different things  3    Trouble relaxing  0    Being so restless that it's hard to sit still 1    Becoming easily annoyed or irritable  3    Feeling afraid as  if something awful might happen 3      How difficult have these problems made it for you   to do your work,  take care of things at home, or   get along with other people? Extremely difficult    Total Score: 16    Total Score Anxiety Severity  1-4  Minimal anxiety  5-9  Mild anxiety  10-14  Moderate anxiety  15-21  Severe anxiety        Results for JOVI STRAUSS (MRN [id number]) as of 11/5/2019 15:52   Ref. Range 9/5/2019 07:18 11/4/2019 08:19   WBC Latest Ref Range: 3.90 - 12.70 K/uL  5.67   RBC Latest Ref Range: 4.00 - 5.40 M/uL  4.77   Hemoglobin Latest Ref Range: 12.0 - 16.0 g/dL  14.7   Hematocrit Latest Ref Range: 37.0 - 48.5 %  43.8   MCV Latest Ref Range: 82 - 98 fL  92   MCH Latest Ref Range: 27.0 - 31.0 pg  30.8   MCHC Latest Ref Range: 32.0 - 36.0 g/dL  33.6   RDW Latest Ref Range: 11.5 - 14.5 %  11.8   Platelets Latest Ref Range: 150 - 350 K/uL  291   MPV Latest Ref Range: 9.2 - 12.9 fL  10.8   Sodium Latest Ref Range: 136 - 145 mmol/L 141 140   Potassium Latest Ref Range: 3.5 - 5.1 mmol/L 4.0 4.0   Chloride Latest Ref Range: 95 - 110 mmol/L 105 105   CO2 Latest Ref Range: 23 - 29 mmol/L 27 28   Anion Gap Latest Ref Range: 8 - 16 mmol/L 9 7 (L)   BUN, Bld Latest Ref Range: 6 - 20 mg/dL 8 6   Creatinine Latest Ref Range: 0.5 - 1.4 mg/dL 1.0 0.9   eGFR non-AA Latest Ref Range: >60 mL/min/1.73 m^2 >60.0 >60.0   eGFR if AA Latest Ref Range: >60 mL/min/1.73 m^2 >60.0 >60.0   Glucose Latest Ref Range: 70 - 110 mg/dL 87 89   Calcium Latest Ref Range: 8.7 - 10.5 mg/dL 9.8 9.1   Alk Phos Latest Ref Range: 55 - 135 U/L 74 68   Total Protein Latest Ref Range: 6.0 - 8.4 g/dL 7.6 7.1   Albumin Latest Ref Range: 3.5 - 5.2 g/dL 4.2 4.0   Total Bilirubin Latest Ref Range: 0.1 - 1.0 mg/dL 1.1 (H) 0.9   AST Latest Ref Range: 10 - 40 U/L 19 22   ALT Latest Ref Range: 10 - 44 U/L 21 29   Triglycerides Latest Ref Range: 30 - 150 mg/dL 128 142   Cholesterol Latest Ref Range: 120 - 199 mg/dL 218 (H) 208 (H)   HDL Latest Ref  "Range: 40 - 75 mg/dL 47 43   Hdl/Chol Ratio Latest Ref Range: 20.0 - 50.0 % 21.6 20.7   LDL Chol Latest Ref Range: 63.0 - 159.0 mg/dL 145.4 136.6   Non-HDL Chol Latest Units: mg/dL 171 165   Chol/HDL Ratio Latest Ref Range: 2.0 - 5.0  4.6 4.8   Hgb A1C  Latest Ref Range: 4.0 - 5.6 %  5.0   TSH Latest Ref Range: 0.400 - 4.000 uIU/mL 1.694    Free T4 Latest Ref Range: 0.71 - 1.51 ng/dL 0.98    LH Latest Ref Range: See Text mIU/mL 14.7    HIV 1/2 Ag/Ab Latest Ref Range: Negative   Negative         Objective:      /60   Pulse 82   Ht 5' 3" (1.6 m)   Wt 64.5 kg (142 lb 1.6 oz)   LMP 10/14/2019   SpO2 99%   BMI 25.17 kg/m²     Physical Exam    The entirety of today's visit was devoted to counseling.  The visit lasted 20 minutes.    Assessment:       1. Asperger's syndrome    2. Anxiety with depression        Plan:       Request copy of initial evaluation.  Trial of sertraline.  Alprazolam prn panic attack.  Discuss with Ms. Atkins.    Further recommendations to follow after above.      "This note will not be shared with the patient."  "

## 2019-11-16 ENCOUNTER — PATIENT MESSAGE (OUTPATIENT)
Dept: PSYCHIATRY | Facility: CLINIC | Age: 29
End: 2019-11-16

## 2019-11-21 ENCOUNTER — PATIENT MESSAGE (OUTPATIENT)
Dept: FAMILY MEDICINE | Facility: CLINIC | Age: 29
End: 2019-11-21

## 2019-11-22 ENCOUNTER — OFFICE VISIT (OUTPATIENT)
Dept: PSYCHIATRY | Facility: CLINIC | Age: 29
End: 2019-11-22
Payer: COMMERCIAL

## 2019-11-22 DIAGNOSIS — F41.9 ANXIETY: ICD-10-CM

## 2019-11-22 DIAGNOSIS — F84.5 ASPERGER'S SYNDROME: Primary | ICD-10-CM

## 2019-11-22 PROCEDURE — 99999 PR PBB SHADOW E&M-EST. PATIENT-LVL I: ICD-10-PCS | Mod: PBBFAC,,, | Performed by: SOCIAL WORKER

## 2019-11-22 PROCEDURE — 99999 PR PBB SHADOW E&M-EST. PATIENT-LVL I: CPT | Mod: PBBFAC,,, | Performed by: SOCIAL WORKER

## 2019-11-22 PROCEDURE — 90791 PSYCH DIAGNOSTIC EVALUATION: CPT | Mod: S$GLB,,, | Performed by: SOCIAL WORKER

## 2019-11-22 PROCEDURE — 90791 PR PSYCHIATRIC DIAGNOSTIC EVALUATION: ICD-10-PCS | Mod: S$GLB,,, | Performed by: SOCIAL WORKER

## 2019-11-26 NOTE — PROGRESS NOTES
Psychiatry Initial Visit (PhD/LCSW)  Diagnostic Interview - CPT 25007    Date: 11/22/2019    Site: [facility]    Referral source: PCP- Dr. Hall    Clinical status of patient: Outpatient    Safia Morales, a 29 y.o. female, for initial evaluation visit.  Met with patient.    Chief complaint/reason for encounter: anxiety, sleep and interpersonal    History of present illness: Pt presented to scheduled initial session which lasted for 60 minutes. Session focused on building rapport, establishing a therapeutic relationship and history of mental health treatment. Clinician went over limits to confidentiality, including mandated reporting and safety during potential crisis. Pt presents to establish care, reports she had a mental breakdown two months ago after getting fired from her job. Pt panicked and scratched her face. Since then, pt was told she had a dx of Aspergers syndrome. Pt shows me the paperwork from 2001 evaluation. Discussed how she relates to this information- notes she has always had a hard time connecting to others emotionally and difficulty understanding directions at work. She is interested in learning effective coping skills to manage dx and ongoing stressors. Pt reports she struggles to get restful sleep, wakes often throughout the night.  There are no current indicators of raya, hallucinations, delusions, bizarre behaviors or other indicators of psychotic process. Pt is forward thinking and goal directed, notes no SI/HI.    Pain: noncontributory    Symptoms:   · Mood: depressed mood, diminished interest and social isolation  · Anxiety: excessive anxiety/worry and restlessness/keyed up  · Substance abuse: denied  · Cognitive functioning: denied  · Health behaviors: noncontributory    Psychiatric history: psychotropic management by PCP and has participated in counseling/psychotherapy on an outpatient basis in the past    Medical history: noncontributory- pt reports some stomach problems and  lactose intolerance.     Family history of psychiatric illness: none known- reports her father has similar tendencies to her, but no formal diagnosis that she knows of.     Social history (marriage, employment, etc.): Pt currently lives with her parents. She is very close to her mother. She has one younger brother with whom she is not close. 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 She notes multiple disagreements with coworkers who often bullied her. Pt was working at [employer] for 3 years, but left after not receiving a raise. 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 Was dating previous boyfriend for 3 years- they did not share the same interests and boyfriend started smoking marijuana. She has limited peer supports, recently joined a facebook group for individuals with Aspergers. She is very involved in crafting, shows pictures of cards, coasters and placemats she has made. She enjoys reading/watching [employer] shows and riding her bike.     Substance use:   Alcohol: limited   Drugs: none   Tobacco: none   Caffeine: none    Current medications and drug reactions (include OTC, herbal): see medication list. Pt started Xanax and Zoloft two weeks ago, is unsure if they are helping yet.     Strengths and liabilities: Strength: Patient accepts guidance/feedback, Strength: Patient is expressive/articulate., Strength: Patient is intelligent., Strength: Patient is motivated for change., Strength: Patient is physically healthy., Strength: Patient has positive support network., Strength: Patient has reasonable judgment., Strength: Patient is stable., Liability: Patient lacks coping skills.    Current Evaluation:     Mental Status Exam:  General Appearance:  unremarkable, age appropriate, normal weight, well nourished, casually dressed, neatly groomed    Speech: normal tone, normal rate, normal pitch, normal volume      Level of Cooperation: cooperative      Thought Processes: normal and logical   Mood: anxious      Thought Content: normal, no suicidality, no homicidality, delusions, or paranoia   Affect: congruent and appropriate   Orientation: Oriented x3   Memory: recent >  intact   Attention Span & Concentration: intact   Fund of General Knowledge: intact and appropriate to age and level of education   Abstract Reasoning: Not assessed   Judgment & Insight: fair, limited     Language  intact     Diagnostic Impression - Plan:       ICD-10-CM ICD-9-CM   1. Asperger's syndrome F84.5 299.80   2. Anxiety F41.9 300.00       Plan:individual psychotherapy and medication management by physician    Return to Clinic: as scheduled    Length of Service (minutes): 60

## 2019-12-16 ENCOUNTER — TELEPHONE (OUTPATIENT)
Dept: FAMILY MEDICINE | Facility: CLINIC | Age: 29
End: 2019-12-16

## 2019-12-16 RX ORDER — SERTRALINE HYDROCHLORIDE 25 MG/1
25 TABLET, FILM COATED ORAL DAILY
Qty: 30 TABLET | Refills: 1 | Status: CANCELLED | OUTPATIENT
Start: 2019-12-16 | End: 2020-12-15

## 2019-12-18 RX ORDER — SERTRALINE HYDROCHLORIDE 25 MG/1
25 TABLET, FILM COATED ORAL DAILY
Qty: 30 TABLET | Refills: 1 | Status: SHIPPED | OUTPATIENT
Start: 2020-01-04 | End: 2020-02-26

## 2019-12-30 RX ORDER — SERTRALINE HYDROCHLORIDE 25 MG/1
TABLET, FILM COATED ORAL
Qty: 30 TABLET | Refills: 1 | OUTPATIENT
Start: 2019-12-30

## 2020-01-19 ENCOUNTER — PATIENT MESSAGE (OUTPATIENT)
Dept: FAMILY MEDICINE | Facility: CLINIC | Age: 30
End: 2020-01-19

## 2020-01-20 ENCOUNTER — PATIENT MESSAGE (OUTPATIENT)
Dept: FAMILY MEDICINE | Facility: CLINIC | Age: 30
End: 2020-01-20

## 2020-02-01 ENCOUNTER — PATIENT MESSAGE (OUTPATIENT)
Dept: PSYCHIATRY | Facility: CLINIC | Age: 30
End: 2020-02-01

## 2020-02-26 RX ORDER — SERTRALINE HYDROCHLORIDE 25 MG/1
TABLET, FILM COATED ORAL
Qty: 30 TABLET | Refills: 1 | Status: SHIPPED | OUTPATIENT
Start: 2020-02-26 | End: 2020-04-30

## 2020-04-30 RX ORDER — SERTRALINE HYDROCHLORIDE 25 MG/1
TABLET, FILM COATED ORAL
Qty: 90 TABLET | Refills: 0 | Status: SHIPPED | OUTPATIENT
Start: 2020-04-30 | End: 2020-07-23

## 2021-01-04 ENCOUNTER — PATIENT MESSAGE (OUTPATIENT)
Dept: ADMINISTRATIVE | Facility: HOSPITAL | Age: 31
End: 2021-01-04

## 2021-01-11 RX ORDER — SERTRALINE HYDROCHLORIDE 25 MG/1
TABLET, FILM COATED ORAL
Qty: 30 TABLET | Refills: 0 | Status: SHIPPED | OUTPATIENT
Start: 2021-01-11 | End: 2021-02-08

## 2021-02-08 RX ORDER — SERTRALINE HYDROCHLORIDE 25 MG/1
TABLET, FILM COATED ORAL
Qty: 10 TABLET | Refills: 0 | Status: SHIPPED | OUTPATIENT
Start: 2021-02-08 | End: 2021-02-22 | Stop reason: SDUPTHER

## 2021-02-22 ENCOUNTER — OFFICE VISIT (OUTPATIENT)
Dept: FAMILY MEDICINE | Facility: CLINIC | Age: 31
End: 2021-02-22
Attending: FAMILY MEDICINE
Payer: COMMERCIAL

## 2021-02-22 VITALS
WEIGHT: 128 LBS | HEART RATE: 86 BPM | DIASTOLIC BLOOD PRESSURE: 62 MMHG | HEIGHT: 63 IN | BODY MASS INDEX: 22.68 KG/M2 | SYSTOLIC BLOOD PRESSURE: 102 MMHG | OXYGEN SATURATION: 97 %

## 2021-02-22 DIAGNOSIS — Z00.00 LABORATORY EXAM ORDERED AS PART OF ROUTINE GENERAL MEDICAL EXAMINATION: ICD-10-CM

## 2021-02-22 DIAGNOSIS — F41.8 ANXIETY WITH DEPRESSION: ICD-10-CM

## 2021-02-22 DIAGNOSIS — F41.8 ANXIETY WITH DEPRESSION: Primary | ICD-10-CM

## 2021-02-22 DIAGNOSIS — Z00.00 ROUTINE GENERAL MEDICAL EXAMINATION AT A HEALTH CARE FACILITY: Primary | ICD-10-CM

## 2021-02-22 DIAGNOSIS — F84.5 ASPERGER'S SYNDROME: ICD-10-CM

## 2021-02-22 DIAGNOSIS — Z11.59 NEED FOR HEPATITIS C SCREENING TEST: ICD-10-CM

## 2021-02-22 PROCEDURE — 1126F PR PAIN SEVERITY QUANTIFIED, NO PAIN PRESENT: ICD-10-PCS | Mod: S$GLB,,, | Performed by: FAMILY MEDICINE

## 2021-02-22 PROCEDURE — 99395 PREV VISIT EST AGE 18-39: CPT | Mod: S$GLB,,, | Performed by: FAMILY MEDICINE

## 2021-02-22 PROCEDURE — 99999 PR PBB SHADOW E&M-EST. PATIENT-LVL III: ICD-10-PCS | Mod: PBBFAC,,, | Performed by: FAMILY MEDICINE

## 2021-02-22 PROCEDURE — 1126F AMNT PAIN NOTED NONE PRSNT: CPT | Mod: S$GLB,,, | Performed by: FAMILY MEDICINE

## 2021-02-22 PROCEDURE — 3008F BODY MASS INDEX DOCD: CPT | Mod: CPTII,S$GLB,, | Performed by: FAMILY MEDICINE

## 2021-02-22 PROCEDURE — 3008F PR BODY MASS INDEX (BMI) DOCUMENTED: ICD-10-PCS | Mod: CPTII,S$GLB,, | Performed by: FAMILY MEDICINE

## 2021-02-22 PROCEDURE — 99395 PR PREVENTIVE VISIT,EST,18-39: ICD-10-PCS | Mod: S$GLB,,, | Performed by: FAMILY MEDICINE

## 2021-02-22 PROCEDURE — 99999 PR PBB SHADOW E&M-EST. PATIENT-LVL III: CPT | Mod: PBBFAC,,, | Performed by: FAMILY MEDICINE

## 2021-02-22 RX ORDER — SERTRALINE HYDROCHLORIDE 25 MG/1
25 TABLET, FILM COATED ORAL DAILY
Qty: 90 TABLET | Refills: 1 | Status: SHIPPED | OUTPATIENT
Start: 2021-02-22 | End: 2021-07-13 | Stop reason: SDUPTHER

## 2021-02-23 ENCOUNTER — LAB VISIT (OUTPATIENT)
Dept: LAB | Facility: HOSPITAL | Age: 31
End: 2021-02-23
Attending: FAMILY MEDICINE
Payer: COMMERCIAL

## 2021-02-23 DIAGNOSIS — Z00.00 LABORATORY EXAM ORDERED AS PART OF ROUTINE GENERAL MEDICAL EXAMINATION: ICD-10-CM

## 2021-02-23 DIAGNOSIS — Z11.59 NEED FOR HEPATITIS C SCREENING TEST: ICD-10-CM

## 2021-02-23 LAB — HCV AB SERPL QL IA: NEGATIVE

## 2021-02-23 PROCEDURE — 86803 HEPATITIS C AB TEST: CPT

## 2021-02-23 PROCEDURE — 80061 LIPID PANEL: CPT

## 2021-02-23 PROCEDURE — 80053 COMPREHEN METABOLIC PANEL: CPT

## 2021-02-23 PROCEDURE — 84443 ASSAY THYROID STIM HORMONE: CPT

## 2021-02-23 PROCEDURE — 36415 COLL VENOUS BLD VENIPUNCTURE: CPT

## 2021-02-24 ENCOUNTER — PATIENT MESSAGE (OUTPATIENT)
Dept: FAMILY MEDICINE | Facility: CLINIC | Age: 31
End: 2021-02-24

## 2021-02-24 DIAGNOSIS — Z91.89 FRAMINGHAM CARDIAC RISK <10% IN NEXT 10 YEARS: ICD-10-CM

## 2021-02-24 LAB
ALBUMIN SERPL BCP-MCNC: 3.9 G/DL (ref 3.5–5.2)
ALP SERPL-CCNC: 58 U/L (ref 55–135)
ALT SERPL W/O P-5'-P-CCNC: 17 U/L (ref 10–44)
ANION GAP SERPL CALC-SCNC: 11 MMOL/L (ref 8–16)
AST SERPL-CCNC: 16 U/L (ref 10–40)
BILIRUB SERPL-MCNC: 0.9 MG/DL (ref 0.1–1)
BUN SERPL-MCNC: 7 MG/DL (ref 6–20)
CALCIUM SERPL-MCNC: 8.8 MG/DL (ref 8.7–10.5)
CHLORIDE SERPL-SCNC: 104 MMOL/L (ref 95–110)
CHOLEST SERPL-MCNC: 200 MG/DL (ref 120–199)
CHOLEST/HDLC SERPL: 4.3 {RATIO} (ref 2–5)
CO2 SERPL-SCNC: 24 MMOL/L (ref 23–29)
CREAT SERPL-MCNC: 0.8 MG/DL (ref 0.5–1.4)
EST. GFR  (AFRICAN AMERICAN): >60 ML/MIN/1.73 M^2
EST. GFR  (NON AFRICAN AMERICAN): >60 ML/MIN/1.73 M^2
GLUCOSE SERPL-MCNC: 72 MG/DL (ref 70–110)
HDLC SERPL-MCNC: 46 MG/DL (ref 40–75)
HDLC SERPL: 23 % (ref 20–50)
LDLC SERPL CALC-MCNC: 132.8 MG/DL (ref 63–159)
NONHDLC SERPL-MCNC: 154 MG/DL
POTASSIUM SERPL-SCNC: 4.4 MMOL/L (ref 3.5–5.1)
PROT SERPL-MCNC: 6.9 G/DL (ref 6–8.4)
SODIUM SERPL-SCNC: 139 MMOL/L (ref 136–145)
TRIGL SERPL-MCNC: 106 MG/DL (ref 30–150)
TSH SERPL DL<=0.005 MIU/L-ACNC: 1.85 UIU/ML (ref 0.4–4)

## 2021-03-04 ENCOUNTER — PATIENT MESSAGE (OUTPATIENT)
Dept: FAMILY MEDICINE | Facility: CLINIC | Age: 31
End: 2021-03-04

## 2021-03-31 ENCOUNTER — PATIENT MESSAGE (OUTPATIENT)
Dept: FAMILY MEDICINE | Facility: CLINIC | Age: 31
End: 2021-03-31

## 2021-07-13 ENCOUNTER — TELEPHONE (OUTPATIENT)
Dept: INTERNAL MEDICINE | Facility: CLINIC | Age: 31
End: 2021-07-13

## 2021-07-13 ENCOUNTER — PATIENT MESSAGE (OUTPATIENT)
Dept: INTERNAL MEDICINE | Facility: CLINIC | Age: 31
End: 2021-07-13

## 2021-07-13 DIAGNOSIS — F41.8 ANXIETY WITH DEPRESSION: Primary | ICD-10-CM

## 2021-07-13 RX ORDER — SERTRALINE HYDROCHLORIDE 25 MG/1
25 TABLET, FILM COATED ORAL DAILY
Qty: 30 TABLET | Refills: 0 | Status: SHIPPED | OUTPATIENT
Start: 2021-07-13 | End: 2021-08-06 | Stop reason: SDUPTHER

## 2021-07-18 ENCOUNTER — PATIENT MESSAGE (OUTPATIENT)
Dept: INTERNAL MEDICINE | Facility: CLINIC | Age: 31
End: 2021-07-18

## 2021-08-06 ENCOUNTER — OFFICE VISIT (OUTPATIENT)
Dept: INTERNAL MEDICINE | Facility: CLINIC | Age: 31
End: 2021-08-06
Payer: COMMERCIAL

## 2021-08-06 VITALS
SYSTOLIC BLOOD PRESSURE: 112 MMHG | OXYGEN SATURATION: 99 % | BODY MASS INDEX: 23.09 KG/M2 | TEMPERATURE: 99 F | HEIGHT: 63 IN | HEART RATE: 81 BPM | DIASTOLIC BLOOD PRESSURE: 72 MMHG | WEIGHT: 130.31 LBS

## 2021-08-06 DIAGNOSIS — J30.9 ALLERGIC RHINITIS, UNSPECIFIED SEASONALITY, UNSPECIFIED TRIGGER: ICD-10-CM

## 2021-08-06 DIAGNOSIS — Z00.00 VISIT FOR ANNUAL HEALTH EXAMINATION: Primary | ICD-10-CM

## 2021-08-06 DIAGNOSIS — F41.8 ANXIETY WITH DEPRESSION: ICD-10-CM

## 2021-08-06 PROCEDURE — 99203 OFFICE O/P NEW LOW 30 MIN: CPT | Mod: S$GLB,,, | Performed by: INTERNAL MEDICINE

## 2021-08-06 PROCEDURE — 99999 PR PBB SHADOW E&M-EST. PATIENT-LVL IV: CPT | Mod: PBBFAC,,, | Performed by: INTERNAL MEDICINE

## 2021-08-06 PROCEDURE — 1159F PR MEDICATION LIST DOCUMENTED IN MEDICAL RECORD: ICD-10-PCS | Mod: CPTII,S$GLB,, | Performed by: INTERNAL MEDICINE

## 2021-08-06 PROCEDURE — 3074F SYST BP LT 130 MM HG: CPT | Mod: CPTII,S$GLB,, | Performed by: INTERNAL MEDICINE

## 2021-08-06 PROCEDURE — 3074F PR MOST RECENT SYSTOLIC BLOOD PRESSURE < 130 MM HG: ICD-10-PCS | Mod: CPTII,S$GLB,, | Performed by: INTERNAL MEDICINE

## 2021-08-06 PROCEDURE — 3078F DIAST BP <80 MM HG: CPT | Mod: CPTII,S$GLB,, | Performed by: INTERNAL MEDICINE

## 2021-08-06 PROCEDURE — 3008F PR BODY MASS INDEX (BMI) DOCUMENTED: ICD-10-PCS | Mod: CPTII,S$GLB,, | Performed by: INTERNAL MEDICINE

## 2021-08-06 PROCEDURE — 99203 PR OFFICE/OUTPT VISIT, NEW, LEVL III, 30-44 MIN: ICD-10-PCS | Mod: S$GLB,,, | Performed by: INTERNAL MEDICINE

## 2021-08-06 PROCEDURE — 99999 PR PBB SHADOW E&M-EST. PATIENT-LVL IV: ICD-10-PCS | Mod: PBBFAC,,, | Performed by: INTERNAL MEDICINE

## 2021-08-06 PROCEDURE — 1160F RVW MEDS BY RX/DR IN RCRD: CPT | Mod: CPTII,S$GLB,, | Performed by: INTERNAL MEDICINE

## 2021-08-06 PROCEDURE — 1160F PR REVIEW ALL MEDS BY PRESCRIBER/CLIN PHARMACIST DOCUMENTED: ICD-10-PCS | Mod: CPTII,S$GLB,, | Performed by: INTERNAL MEDICINE

## 2021-08-06 PROCEDURE — 3078F PR MOST RECENT DIASTOLIC BLOOD PRESSURE < 80 MM HG: ICD-10-PCS | Mod: CPTII,S$GLB,, | Performed by: INTERNAL MEDICINE

## 2021-08-06 PROCEDURE — 1126F AMNT PAIN NOTED NONE PRSNT: CPT | Mod: CPTII,S$GLB,, | Performed by: INTERNAL MEDICINE

## 2021-08-06 PROCEDURE — 1159F MED LIST DOCD IN RCRD: CPT | Mod: CPTII,S$GLB,, | Performed by: INTERNAL MEDICINE

## 2021-08-06 PROCEDURE — 1126F PR PAIN SEVERITY QUANTIFIED, NO PAIN PRESENT: ICD-10-PCS | Mod: CPTII,S$GLB,, | Performed by: INTERNAL MEDICINE

## 2021-08-06 PROCEDURE — 3008F BODY MASS INDEX DOCD: CPT | Mod: CPTII,S$GLB,, | Performed by: INTERNAL MEDICINE

## 2021-08-06 RX ORDER — FLUTICASONE PROPIONATE 50 MCG
1 SPRAY, SUSPENSION (ML) NASAL DAILY
COMMUNITY
End: 2023-07-12

## 2021-08-06 RX ORDER — SERTRALINE HYDROCHLORIDE 25 MG/1
25 TABLET, FILM COATED ORAL DAILY
Qty: 30 TABLET | Refills: 11 | Status: SHIPPED | OUTPATIENT
Start: 2021-08-06 | End: 2022-08-12

## 2021-08-06 RX ORDER — LORATADINE 10 MG/1
10 TABLET ORAL DAILY
COMMUNITY
End: 2023-07-12

## 2022-01-19 ENCOUNTER — PATIENT MESSAGE (OUTPATIENT)
Dept: ADMINISTRATIVE | Facility: HOSPITAL | Age: 32
End: 2022-01-19
Payer: COMMERCIAL

## 2022-03-16 ENCOUNTER — PATIENT MESSAGE (OUTPATIENT)
Dept: ADMINISTRATIVE | Facility: HOSPITAL | Age: 32
End: 2022-03-16
Payer: COMMERCIAL

## 2022-05-08 ENCOUNTER — PATIENT MESSAGE (OUTPATIENT)
Dept: INTERNAL MEDICINE | Facility: CLINIC | Age: 32
End: 2022-05-08
Payer: COMMERCIAL

## 2022-05-09 ENCOUNTER — PATIENT MESSAGE (OUTPATIENT)
Dept: INTERNAL MEDICINE | Facility: CLINIC | Age: 32
End: 2022-05-09
Payer: COMMERCIAL

## 2022-05-09 DIAGNOSIS — F41.8 ANXIETY WITH DEPRESSION: ICD-10-CM

## 2022-05-09 RX ORDER — SERTRALINE HYDROCHLORIDE 25 MG/1
25 TABLET, FILM COATED ORAL DAILY
Qty: 30 TABLET | Refills: 11 | Status: CANCELLED | OUTPATIENT
Start: 2022-05-09

## 2022-05-10 ENCOUNTER — PATIENT MESSAGE (OUTPATIENT)
Dept: INTERNAL MEDICINE | Facility: CLINIC | Age: 32
End: 2022-05-10
Payer: COMMERCIAL

## 2022-08-04 ENCOUNTER — PATIENT MESSAGE (OUTPATIENT)
Dept: UROGYNECOLOGY | Facility: CLINIC | Age: 32
End: 2022-08-04
Payer: COMMERCIAL

## 2022-08-11 DIAGNOSIS — F41.8 ANXIETY WITH DEPRESSION: ICD-10-CM

## 2022-08-12 RX ORDER — SERTRALINE HYDROCHLORIDE 25 MG/1
TABLET, FILM COATED ORAL
Qty: 90 TABLET | Refills: 0 | Status: SHIPPED | OUTPATIENT
Start: 2022-08-12 | End: 2022-11-12 | Stop reason: SDUPTHER

## 2022-08-12 NOTE — TELEPHONE ENCOUNTER
Refill Decision Note   Safia Carmen  is requesting a refill authorization.  Brief Assessment and Rationale for Refill:  Approve     Medication Therapy Plan:       Medication Reconciliation Completed: No   Comments:     No Care Gaps recommended.     Note composed:5:16 AM 08/12/2022

## 2022-08-12 NOTE — TELEPHONE ENCOUNTER
No new care gaps identified.  [facility] Embedded Care Gaps. Reference number: [id number]. 8/11/2022   8:51:56 PM CDT

## 2022-08-14 DIAGNOSIS — F41.8 ANXIETY WITH DEPRESSION: ICD-10-CM

## 2022-08-14 NOTE — TELEPHONE ENCOUNTER
No new care gaps identified.  Health [facility] Embedded Care Gaps. Reference number: [id number]. 8/14/2022   8:06:07 AM STEVET

## 2022-08-15 RX ORDER — SERTRALINE HYDROCHLORIDE 25 MG/1
TABLET, FILM COATED ORAL
Qty: 90 TABLET | Refills: 0 | OUTPATIENT
Start: 2022-08-15

## 2022-08-15 NOTE — TELEPHONE ENCOUNTER
Refill Decision Note   Safia Carmen  is requesting a refill authorization.  Brief Assessment and Rationale for Refill:  Quick Discontinue     Medication Therapy Plan:       Medication Reconciliation Completed: No   Comments:     No Care Gaps recommended.     Note composed:6:39 AM 08/15/2022

## 2022-09-01 ENCOUNTER — PATIENT MESSAGE (OUTPATIENT)
Dept: INTERNAL MEDICINE | Facility: CLINIC | Age: 32
End: 2022-09-01
Payer: COMMERCIAL

## 2022-09-01 ENCOUNTER — LAB VISIT (OUTPATIENT)
Dept: LAB | Facility: OTHER | Age: 32
End: 2022-09-01
Attending: NURSE PRACTITIONER
Payer: COMMERCIAL

## 2022-09-01 ENCOUNTER — OFFICE VISIT (OUTPATIENT)
Dept: UROGYNECOLOGY | Facility: CLINIC | Age: 32
End: 2022-09-01
Payer: COMMERCIAL

## 2022-09-01 ENCOUNTER — PATIENT MESSAGE (OUTPATIENT)
Dept: UROGYNECOLOGY | Facility: CLINIC | Age: 32
End: 2022-09-01
Payer: COMMERCIAL

## 2022-09-01 ENCOUNTER — TELEPHONE (OUTPATIENT)
Dept: UROGYNECOLOGY | Facility: CLINIC | Age: 32
End: 2022-09-01
Payer: COMMERCIAL

## 2022-09-01 VITALS
HEIGHT: 63 IN | BODY MASS INDEX: 26.01 KG/M2 | SYSTOLIC BLOOD PRESSURE: 122 MMHG | DIASTOLIC BLOOD PRESSURE: 80 MMHG | WEIGHT: 146.81 LBS

## 2022-09-01 DIAGNOSIS — N92.3 INTERMENSTRUAL BLEEDING: ICD-10-CM

## 2022-09-01 DIAGNOSIS — Z01.419 WELL WOMAN EXAM: Primary | ICD-10-CM

## 2022-09-01 DIAGNOSIS — N92.3 INTERMENSTRUAL BLEEDING: Primary | ICD-10-CM

## 2022-09-01 DIAGNOSIS — Z12.4 CERVICAL CANCER SCREENING: ICD-10-CM

## 2022-09-01 LAB
FSH SERPL-ACNC: 4.13 MIU/ML
LH SERPL-ACNC: 6.5 MIU/ML
PROLACTIN SERPL IA-MCNC: 17.8 NG/ML (ref 5.2–26.5)
TSH SERPL DL<=0.005 MIU/L-ACNC: 1.14 UIU/ML (ref 0.4–4)

## 2022-09-01 PROCEDURE — 3079F PR MOST RECENT DIASTOLIC BLOOD PRESSURE 80-89 MM HG: ICD-10-PCS | Mod: CPTII,S$GLB,, | Performed by: NURSE PRACTITIONER

## 2022-09-01 PROCEDURE — 83002 ASSAY OF GONADOTROPIN (LH): CPT | Performed by: NURSE PRACTITIONER

## 2022-09-01 PROCEDURE — 3074F PR MOST RECENT SYSTOLIC BLOOD PRESSURE < 130 MM HG: ICD-10-PCS | Mod: CPTII,S$GLB,, | Performed by: NURSE PRACTITIONER

## 2022-09-01 PROCEDURE — 3008F BODY MASS INDEX DOCD: CPT | Mod: CPTII,S$GLB,, | Performed by: NURSE PRACTITIONER

## 2022-09-01 PROCEDURE — 1160F PR REVIEW ALL MEDS BY PRESCRIBER/CLIN PHARMACIST DOCUMENTED: ICD-10-PCS | Mod: CPTII,S$GLB,, | Performed by: NURSE PRACTITIONER

## 2022-09-01 PROCEDURE — 1159F PR MEDICATION LIST DOCUMENTED IN MEDICAL RECORD: ICD-10-PCS | Mod: CPTII,S$GLB,, | Performed by: NURSE PRACTITIONER

## 2022-09-01 PROCEDURE — 99395 PR PREVENTIVE VISIT,EST,18-39: ICD-10-PCS | Mod: S$GLB,,, | Performed by: NURSE PRACTITIONER

## 2022-09-01 PROCEDURE — 1159F MED LIST DOCD IN RCRD: CPT | Mod: CPTII,S$GLB,, | Performed by: NURSE PRACTITIONER

## 2022-09-01 PROCEDURE — 99999 PR PBB SHADOW E&M-EST. PATIENT-LVL IV: CPT | Mod: PBBFAC,,, | Performed by: NURSE PRACTITIONER

## 2022-09-01 PROCEDURE — 83001 ASSAY OF GONADOTROPIN (FSH): CPT | Performed by: NURSE PRACTITIONER

## 2022-09-01 PROCEDURE — 99395 PREV VISIT EST AGE 18-39: CPT | Mod: S$GLB,,, | Performed by: NURSE PRACTITIONER

## 2022-09-01 PROCEDURE — 3079F DIAST BP 80-89 MM HG: CPT | Mod: CPTII,S$GLB,, | Performed by: NURSE PRACTITIONER

## 2022-09-01 PROCEDURE — 84443 ASSAY THYROID STIM HORMONE: CPT | Performed by: NURSE PRACTITIONER

## 2022-09-01 PROCEDURE — 88175 CYTOPATH C/V AUTO FLUID REDO: CPT | Performed by: NURSE PRACTITIONER

## 2022-09-01 PROCEDURE — 84146 ASSAY OF PROLACTIN: CPT | Performed by: NURSE PRACTITIONER

## 2022-09-01 PROCEDURE — 1160F RVW MEDS BY RX/DR IN RCRD: CPT | Mod: CPTII,S$GLB,, | Performed by: NURSE PRACTITIONER

## 2022-09-01 PROCEDURE — 3008F PR BODY MASS INDEX (BMI) DOCUMENTED: ICD-10-PCS | Mod: CPTII,S$GLB,, | Performed by: NURSE PRACTITIONER

## 2022-09-01 PROCEDURE — 99999 PR PBB SHADOW E&M-EST. PATIENT-LVL IV: ICD-10-PCS | Mod: PBBFAC,,, | Performed by: NURSE PRACTITIONER

## 2022-09-01 PROCEDURE — 87624 HPV HI-RISK TYP POOLED RSLT: CPT | Performed by: NURSE PRACTITIONER

## 2022-09-01 PROCEDURE — 3074F SYST BP LT 130 MM HG: CPT | Mod: CPTII,S$GLB,, | Performed by: NURSE PRACTITIONER

## 2022-09-01 PROCEDURE — 36415 COLL VENOUS BLD VENIPUNCTURE: CPT | Performed by: NURSE PRACTITIONER

## 2022-09-01 NOTE — PROGRESS NOTES
09/01/2022    SUBJECTIVE:   32 y.o. female for annual exam.      Past Medical History:   Diagnosis Date    Allergy     Anxiety     Depression        Past Surgical History:   Procedure Laterality Date    STRABISMUS SURGERY      x 2    WISDOM TOOTH EXTRACTION  2005       Family History   Problem Relation Age of Onset    Diabetes Paternal Uncle     Cancer Paternal Uncle     Diabetes Maternal Grandfather     No Known Problems Mother     No Known Problems Father     No Known Problems Brother        Social History     Socioeconomic History    Marital status: Single    Number of children: 0   Occupational History     Comment: [employer]   Tobacco Use    Smoking status: Never    Smokeless tobacco: Never   Substance and Sexual Activity    Alcohol use: Yes     Alcohol/week: 1.0 standard drink     Types: 1 Glasses of wine per week    Drug use: No    Sexual activity: Never     Partners: Male   Social History Narrative    The patient does exercise regularly (rides bike to work ~8mi/day).          She is not satisfied with weight.    Rates diet as fair to poor.    She does drink at least 1/2 gallon water daily.    She drinks 0-1 coffee/tea/caffeine-containing soft drinks daily.    Total sleep time at night is 8-9 hours.    She works 40 hours per week.    She does wear seat belts.    Hobbies include sewing, embroidery, reading       Current Outpatient Medications   Medication Sig Dispense Refill    fluticasone propionate (FLONASE) 50 mcg/actuation nasal spray 1 spray by Each Nostril route once daily.      loratadine (CLARITIN) 10 mg tablet Take 10 mg by mouth once daily.      sertraline (ZOLOFT) 25 MG tablet TAKE 1 TABLET(25 MG) BY MOUTH EVERY DAY 90 tablet 0    ALPRAZolam (XANAX) 0.25 MG tablet Take 1 tablet (0.25 mg total) by mouth 3 (three) times daily as needed for Anxiety. 10 tablet 0     No current facility-administered medications for this visit.       Review of patient's allergies indicates:   Allergen Reactions     "Latex, natural rubber Rash          Regular periods- last 3-4 days.  Around every 28 days  Having intramenstural spotting (generally around ovulation)      Well Woman:  Pap:2018 normal, negative HPV        Family History  Family History   Problem Relation Age of Onset    Diabetes Paternal Uncle     Cancer Paternal Uncle     Diabetes Maternal Grandfather     No Known Problems Mother     No Known Problems Father     No Known Problems Brother       Colon CA: No  Breast CA: No  GYN CA: No   CA: No      ROS:  Feeling well.   No dyspnea or chest pain on exertion.    No abdominal pain, change in bowel habits, black or bloody stools.    No urinary tract symptoms.   GYN ROS: intrmentrual spotting around ovulation, no abnormal bleeding, pelvic pain or discharge, no breast pain or new or enlarging lumps on self exam. Complains of hair loss  No neurological complaints.    OBJECTIVE:   The patient appears well, alert, oriented x 3, in no distress.  /80 (BP Location: Left arm, Patient Position: Sitting, BP Method: Medium (Manual))   Ht 5' 3" (1.6 m)   Wt 66.6 kg (146 lb 13.2 oz)   LMP 08/15/2022   BMI 26.01 kg/m²   ENT normal.  Neck supple. No adenopathy, thyroid is enlarged. RY.   Normal respiratory effort  Heart with regular rate and rhythm.   Abdomen soft without tenderness, guarding, mass or organomegaly.   Extremities show no edema, normal peripheral pulses.   Neurological is normal, no focal findings.    BREAST EXAM: breasts appear normal, no suspicious masses, no skin or nipple changes or axillary nodes.  L nipple inverted    PELVIC EXAM:   VULVA: normal appearing vulva with no masses, tenderness or lesions,   VAGINA: normal appearing vagina with normal color and discharge, no lesions,  CERVIX: normal appearing cervix without discharge or lesions,   UTERUS: uterus is normal size, shape, consistency and nontender,   ADNEXA: no masses,   RECTAL: deferred    ASSESSMENT:   1. Well woman exam        2. " Cervical cancer screening  Liquid-Based Pap Smear, Screening    HPV High Risk Genotypes, PCR      3. Intermenstrual bleeding  Follicle Stimulating Hormone    Luteinizing Hormone    TSH    Prolactin    CANCELED: US Pelvis Comp with Transvag NON-OB (xpd            PLAN:     1. Well woman  --pap today   --takes 2-3 weeks for results    2. Intermenstrual bleeding  --pelvic ultrasound ordered  ---labs ordered    3.  RTC 1 year for annual      30 minutes were spent in face to face time with this patient  90 % of this time was spent in counseling and/or coordination of care  Lolis PINA Luiz  [facility]  Division of Female Pelvic Medicine and Reconstructive Surgery  Department of Obstetrics & Gynecology

## 2022-09-01 NOTE — TELEPHONE ENCOUNTER
Left voice message for pt to give the office a call back at [phone] regarding today's appt.

## 2022-09-01 NOTE — PATIENT INSTRUCTIONS
1. Well woman  --pap today   --takes 2-3 weeks for results    2. Intermenstrual bleeding  --pelvic ultrasound ordered  ---labs ordered    3.  RTC 1 year for annual

## 2022-09-02 ENCOUNTER — PATIENT MESSAGE (OUTPATIENT)
Dept: UROGYNECOLOGY | Facility: CLINIC | Age: 32
End: 2022-09-02
Payer: COMMERCIAL

## 2022-09-09 LAB
FINAL PATHOLOGIC DIAGNOSIS: NORMAL
HPV HR 12 DNA SPEC QL NAA+PROBE: NEGATIVE
HPV16 AG SPEC QL: NEGATIVE
HPV18 DNA SPEC QL NAA+PROBE: NEGATIVE
Lab: NORMAL

## 2022-09-13 ENCOUNTER — PATIENT MESSAGE (OUTPATIENT)
Dept: UROGYNECOLOGY | Facility: CLINIC | Age: 32
End: 2022-09-13
Payer: COMMERCIAL

## 2022-09-15 ENCOUNTER — PATIENT MESSAGE (OUTPATIENT)
Dept: UROGYNECOLOGY | Facility: CLINIC | Age: 32
End: 2022-09-15
Payer: COMMERCIAL

## 2022-09-15 ENCOUNTER — TELEPHONE (OUTPATIENT)
Dept: UROGYNECOLOGY | Facility: CLINIC | Age: 32
End: 2022-09-15
Payer: COMMERCIAL

## 2022-09-15 DIAGNOSIS — N92.3 INTERMENSTRUAL BLEEDING: Primary | ICD-10-CM

## 2022-09-15 NOTE — TELEPHONE ENCOUNTER
----- Message from Sparkle Moore sent at 9/15/2022  3:44 PM CDT -----  Name of Who is Calling: JOVI STRAUSS [[id number]]            What is the request in detail: Patient is requesting call back to have ultra sound pelvic faxed over to [facility] fax [phone]  [phone] [id number] nataliia            Can the clinic reply by [facility]: no              What Number to Call Back if not in [facility]: [phone]

## 2022-09-15 NOTE — TELEPHONE ENCOUNTER
Informed pt US order was faxed to [facility]/[phone] as requested. Pt voiced understanding and call ended.

## 2022-09-24 ENCOUNTER — PATIENT MESSAGE (OUTPATIENT)
Dept: UROGYNECOLOGY | Facility: CLINIC | Age: 32
End: 2022-09-24
Payer: COMMERCIAL

## 2022-10-23 ENCOUNTER — PATIENT MESSAGE (OUTPATIENT)
Dept: UROGYNECOLOGY | Facility: CLINIC | Age: 32
End: 2022-10-23
Payer: COMMERCIAL

## 2022-10-27 ENCOUNTER — PATIENT MESSAGE (OUTPATIENT)
Dept: INTERNAL MEDICINE | Facility: CLINIC | Age: 32
End: 2022-10-27

## 2022-10-27 ENCOUNTER — LAB VISIT (OUTPATIENT)
Dept: LAB | Facility: HOSPITAL | Age: 32
End: 2022-10-27
Payer: COMMERCIAL

## 2022-10-27 ENCOUNTER — OFFICE VISIT (OUTPATIENT)
Dept: INTERNAL MEDICINE | Facility: CLINIC | Age: 32
End: 2022-10-27
Payer: COMMERCIAL

## 2022-10-27 VITALS
HEART RATE: 78 BPM | WEIGHT: 153.88 LBS | BODY MASS INDEX: 27.27 KG/M2 | SYSTOLIC BLOOD PRESSURE: 126 MMHG | OXYGEN SATURATION: 99 % | DIASTOLIC BLOOD PRESSURE: 80 MMHG | HEIGHT: 63 IN

## 2022-10-27 DIAGNOSIS — Z00.00 VISIT FOR ANNUAL HEALTH EXAMINATION: Primary | ICD-10-CM

## 2022-10-27 DIAGNOSIS — Z00.00 VISIT FOR ANNUAL HEALTH EXAMINATION: ICD-10-CM

## 2022-10-27 DIAGNOSIS — F41.8 ANXIETY WITH DEPRESSION: ICD-10-CM

## 2022-10-27 DIAGNOSIS — J30.9 ALLERGIC RHINITIS, UNSPECIFIED SEASONALITY, UNSPECIFIED TRIGGER: ICD-10-CM

## 2022-10-27 DIAGNOSIS — T14.8XXA MUSCULOSKELETAL STRAIN: ICD-10-CM

## 2022-10-27 LAB
ALBUMIN SERPL BCP-MCNC: 4.3 G/DL (ref 3.5–5.2)
ALP SERPL-CCNC: 57 U/L (ref 55–135)
ALT SERPL W/O P-5'-P-CCNC: 21 U/L (ref 10–44)
ANION GAP SERPL CALC-SCNC: 13 MMOL/L (ref 8–16)
AST SERPL-CCNC: 20 U/L (ref 10–40)
BASOPHILS # BLD AUTO: 0.04 K/UL (ref 0–0.2)
BASOPHILS NFR BLD: 1 % (ref 0–1.9)
BILIRUB SERPL-MCNC: 0.8 MG/DL (ref 0.1–1)
BUN SERPL-MCNC: 8 MG/DL (ref 6–20)
CALCIUM SERPL-MCNC: 9.3 MG/DL (ref 8.7–10.5)
CHLORIDE SERPL-SCNC: 105 MMOL/L (ref 95–110)
CO2 SERPL-SCNC: 21 MMOL/L (ref 23–29)
CREAT SERPL-MCNC: 0.9 MG/DL (ref 0.5–1.4)
DIFFERENTIAL METHOD: ABNORMAL
EOSINOPHIL # BLD AUTO: 0.4 K/UL (ref 0–0.5)
EOSINOPHIL NFR BLD: 10.8 % (ref 0–8)
ERYTHROCYTE [DISTWIDTH] IN BLOOD BY AUTOMATED COUNT: 11.9 % (ref 11.5–14.5)
EST. GFR  (NO RACE VARIABLE): >60 ML/MIN/1.73 M^2
FERRITIN SERPL-MCNC: 20 NG/ML (ref 20–300)
GLUCOSE SERPL-MCNC: 77 MG/DL (ref 70–110)
HCT VFR BLD AUTO: 44.7 % (ref 37–48.5)
HGB BLD-MCNC: 14.8 G/DL (ref 12–16)
IMM GRANULOCYTES # BLD AUTO: 0.01 K/UL (ref 0–0.04)
IMM GRANULOCYTES NFR BLD AUTO: 0.3 % (ref 0–0.5)
LYMPHOCYTES # BLD AUTO: 1.2 K/UL (ref 1–4.8)
LYMPHOCYTES NFR BLD: 30.2 % (ref 18–48)
MCH RBC QN AUTO: 31.2 PG (ref 27–31)
MCHC RBC AUTO-ENTMCNC: 33.1 G/DL (ref 32–36)
MCV RBC AUTO: 94 FL (ref 82–98)
MONOCYTES # BLD AUTO: 0.4 K/UL (ref 0.3–1)
MONOCYTES NFR BLD: 10.8 % (ref 4–15)
NEUTROPHILS # BLD AUTO: 1.8 K/UL (ref 1.8–7.7)
NEUTROPHILS NFR BLD: 46.9 % (ref 38–73)
NRBC BLD-RTO: 0 /100 WBC
PLATELET # BLD AUTO: 288 K/UL (ref 150–450)
PMV BLD AUTO: 11.9 FL (ref 9.2–12.9)
POTASSIUM SERPL-SCNC: 4.1 MMOL/L (ref 3.5–5.1)
PROT SERPL-MCNC: 7.7 G/DL (ref 6–8.4)
RBC # BLD AUTO: 4.75 M/UL (ref 4–5.4)
SODIUM SERPL-SCNC: 139 MMOL/L (ref 136–145)
TSH SERPL DL<=0.005 MIU/L-ACNC: 1.51 UIU/ML (ref 0.4–4)
VIT B12 SERPL-MCNC: 631 PG/ML (ref 210–950)
WBC # BLD AUTO: 3.88 K/UL (ref 3.9–12.7)

## 2022-10-27 PROCEDURE — 99395 PREV VISIT EST AGE 18-39: CPT | Mod: S$GLB,,, | Performed by: INTERNAL MEDICINE

## 2022-10-27 PROCEDURE — 36415 COLL VENOUS BLD VENIPUNCTURE: CPT | Performed by: INTERNAL MEDICINE

## 2022-10-27 PROCEDURE — 80053 COMPREHEN METABOLIC PANEL: CPT | Performed by: INTERNAL MEDICINE

## 2022-10-27 PROCEDURE — 1159F MED LIST DOCD IN RCRD: CPT | Mod: CPTII,S$GLB,, | Performed by: INTERNAL MEDICINE

## 2022-10-27 PROCEDURE — 3079F PR MOST RECENT DIASTOLIC BLOOD PRESSURE 80-89 MM HG: ICD-10-PCS | Mod: CPTII,S$GLB,, | Performed by: INTERNAL MEDICINE

## 2022-10-27 PROCEDURE — 1160F RVW MEDS BY RX/DR IN RCRD: CPT | Mod: CPTII,S$GLB,, | Performed by: INTERNAL MEDICINE

## 2022-10-27 PROCEDURE — 82607 VITAMIN B-12: CPT | Performed by: INTERNAL MEDICINE

## 2022-10-27 PROCEDURE — 3079F DIAST BP 80-89 MM HG: CPT | Mod: CPTII,S$GLB,, | Performed by: INTERNAL MEDICINE

## 2022-10-27 PROCEDURE — 3074F PR MOST RECENT SYSTOLIC BLOOD PRESSURE < 130 MM HG: ICD-10-PCS | Mod: CPTII,S$GLB,, | Performed by: INTERNAL MEDICINE

## 2022-10-27 PROCEDURE — 99395 PR PREVENTIVE VISIT,EST,18-39: ICD-10-PCS | Mod: S$GLB,,, | Performed by: INTERNAL MEDICINE

## 2022-10-27 PROCEDURE — 85025 COMPLETE CBC W/AUTO DIFF WBC: CPT | Performed by: INTERNAL MEDICINE

## 2022-10-27 PROCEDURE — 82728 ASSAY OF FERRITIN: CPT | Performed by: INTERNAL MEDICINE

## 2022-10-27 PROCEDURE — 1160F PR REVIEW ALL MEDS BY PRESCRIBER/CLIN PHARMACIST DOCUMENTED: ICD-10-PCS | Mod: CPTII,S$GLB,, | Performed by: INTERNAL MEDICINE

## 2022-10-27 PROCEDURE — 3074F SYST BP LT 130 MM HG: CPT | Mod: CPTII,S$GLB,, | Performed by: INTERNAL MEDICINE

## 2022-10-27 PROCEDURE — 99999 PR PBB SHADOW E&M-EST. PATIENT-LVL IV: ICD-10-PCS | Mod: PBBFAC,,, | Performed by: INTERNAL MEDICINE

## 2022-10-27 PROCEDURE — 1159F PR MEDICATION LIST DOCUMENTED IN MEDICAL RECORD: ICD-10-PCS | Mod: CPTII,S$GLB,, | Performed by: INTERNAL MEDICINE

## 2022-10-27 PROCEDURE — 99999 PR PBB SHADOW E&M-EST. PATIENT-LVL IV: CPT | Mod: PBBFAC,,, | Performed by: INTERNAL MEDICINE

## 2022-10-27 PROCEDURE — 84443 ASSAY THYROID STIM HORMONE: CPT | Performed by: INTERNAL MEDICINE

## 2022-10-27 NOTE — PROGRESS NOTES
"INTERNAL MEDICINE ESTABLISHED PATIENT VISIT NOTE    Subjective:     Chief Complaint: Annual Exam       Patient ID: Safia Morales is a 32 y.o. female with anxiety, depression, allergies, last seen by me 8/2021, here today for annual.    Has been having 'pressure' in elbows. Resolves upon straightening of arm. Various job duties including bookkeeping, lifting heavy serving pieces.     Requesting bloodwork today. Knows a few friends with B12 deficiency. Wants to have level checked.      Having bleeding in between cycles. Always feels cold.     Past Medical History:  Past Medical History:   Diagnosis Date    Allergy     Anxiety     Depression        Review of Systems:  Review of Systems   HENT:  Negative for hearing loss.    Eyes:  Negative for discharge.   Respiratory:  Negative for wheezing.    Cardiovascular:  Negative for chest pain and palpitations.   Gastrointestinal:  Negative for blood in stool, constipation, diarrhea and vomiting.   Genitourinary:  Negative for dysuria and hematuria.   Musculoskeletal:  Negative for neck pain.   Neurological:  Negative for weakness and headaches.   Endo/Heme/Allergies:  Negative for polydipsia.     Health Maintenance:   Immunizations:   Influenza - defer  Tdap - 4/2022  Covid 19 - complete   HPV  Prevnar rec at 63  Shingrix rec at 48     Cancer Screening:  PAP: 9/2022 NILM, HPV neg  Mammogram:  n/a  Colonoscopy:  n/a  DEXA:  n/a    Objective:   /80 (BP Location: Left arm, Patient Position: Sitting, BP Method: Medium (Manual))   Pulse 78   Ht 5' 3" (1.6 m)   Wt 69.8 kg (153 lb 14.1 oz)   LMP 10/26/2022   SpO2 99%   BMI 27.26 kg/m²        General: AAO x3, no apparent distress  HEENT: PERRL  CV: RRR, no m/r/g  Pulm: Lungs CTAB, no crackles, no wheezes  Abd: s/NT/ND +BS  Extremities: no c/c/e    Labs:       Assessment/Plan     Visit for annual health examination  Labs ordered  -     CBC Auto Differential; Future; Expected date: 10/27/2022  -     Comprehensive " Metabolic Panel; Future; Expected date: 10/27/2022  -     Vitamin B12; Future; Expected date: 10/27/2022  -     TSH; Future; Expected date: 10/27/2022  -     Ferritin; Future; Expected date: 10/27/2022    Anxiety with depression  -     TSH; Future; Expected date: 10/27/2022    Musculoskeletal strain  OTC pain control, advised to wear compression sleeve when lifting heavy objects    Allergic rhinitis, unspecified seasonality, unspecified trigger    HM as above    RTC in 1 year, sooner if needed.    Amy Shaw MD  Department of Internal Medicine - [facility]  10/27/2022

## 2022-11-28 ENCOUNTER — PATIENT MESSAGE (OUTPATIENT)
Dept: UROGYNECOLOGY | Facility: CLINIC | Age: 32
End: 2022-11-28
Payer: COMMERCIAL

## 2022-11-28 DIAGNOSIS — N92.3 INTERMENSTRUAL BLEEDING: Primary | ICD-10-CM

## 2022-11-29 ENCOUNTER — PATIENT MESSAGE (OUTPATIENT)
Dept: UROGYNECOLOGY | Facility: CLINIC | Age: 32
End: 2022-11-29
Payer: COMMERCIAL

## 2022-11-29 RX ORDER — ACETAMINOPHEN AND CODEINE PHOSPHATE 120; 12 MG/5ML; MG/5ML
1 SOLUTION ORAL DAILY
Qty: 30 TABLET | Refills: 11 | Status: SHIPPED | OUTPATIENT
Start: 2022-11-29 | End: 2023-07-26 | Stop reason: SDUPTHER

## 2023-02-21 ENCOUNTER — PATIENT MESSAGE (OUTPATIENT)
Dept: UROGYNECOLOGY | Facility: CLINIC | Age: 33
End: 2023-02-21
Payer: COMMERCIAL

## 2023-03-30 ENCOUNTER — LAB VISIT (OUTPATIENT)
Dept: LAB | Facility: HOSPITAL | Age: 33
End: 2023-03-30
Payer: COMMERCIAL

## 2023-03-30 ENCOUNTER — OFFICE VISIT (OUTPATIENT)
Dept: ALLERGY | Facility: CLINIC | Age: 33
End: 2023-03-30
Payer: COMMERCIAL

## 2023-03-30 VITALS
DIASTOLIC BLOOD PRESSURE: 56 MMHG | HEART RATE: 76 BPM | SYSTOLIC BLOOD PRESSURE: 119 MMHG | BODY MASS INDEX: 25.81 KG/M2 | WEIGHT: 145.75 LBS | OXYGEN SATURATION: 100 %

## 2023-03-30 DIAGNOSIS — R51.9 FACE PAIN: ICD-10-CM

## 2023-03-30 DIAGNOSIS — J31.0 CHRONIC RHINITIS: Primary | ICD-10-CM

## 2023-03-30 DIAGNOSIS — L30.9 HAND DERMATITIS: ICD-10-CM

## 2023-03-30 DIAGNOSIS — J31.0 CHRONIC RHINITIS: ICD-10-CM

## 2023-03-30 LAB — IGE SERPL-ACNC: <35 IU/ML (ref 0–100)

## 2023-03-30 PROCEDURE — 99999 PR PBB SHADOW E&M-EST. PATIENT-LVL III: ICD-10-PCS | Mod: PBBFAC,,, | Performed by: STUDENT IN AN ORGANIZED HEALTH CARE EDUCATION/TRAINING PROGRAM

## 2023-03-30 PROCEDURE — 1160F PR REVIEW ALL MEDS BY PRESCRIBER/CLIN PHARMACIST DOCUMENTED: ICD-10-PCS | Mod: CPTII,S$GLB,, | Performed by: STUDENT IN AN ORGANIZED HEALTH CARE EDUCATION/TRAINING PROGRAM

## 2023-03-30 PROCEDURE — 3078F PR MOST RECENT DIASTOLIC BLOOD PRESSURE < 80 MM HG: ICD-10-PCS | Mod: CPTII,S$GLB,, | Performed by: STUDENT IN AN ORGANIZED HEALTH CARE EDUCATION/TRAINING PROGRAM

## 2023-03-30 PROCEDURE — 3074F PR MOST RECENT SYSTOLIC BLOOD PRESSURE < 130 MM HG: ICD-10-PCS | Mod: CPTII,S$GLB,, | Performed by: STUDENT IN AN ORGANIZED HEALTH CARE EDUCATION/TRAINING PROGRAM

## 2023-03-30 PROCEDURE — 1160F RVW MEDS BY RX/DR IN RCRD: CPT | Mod: CPTII,S$GLB,, | Performed by: STUDENT IN AN ORGANIZED HEALTH CARE EDUCATION/TRAINING PROGRAM

## 2023-03-30 PROCEDURE — 36415 COLL VENOUS BLD VENIPUNCTURE: CPT | Performed by: STUDENT IN AN ORGANIZED HEALTH CARE EDUCATION/TRAINING PROGRAM

## 2023-03-30 PROCEDURE — 3008F PR BODY MASS INDEX (BMI) DOCUMENTED: ICD-10-PCS | Mod: CPTII,S$GLB,, | Performed by: STUDENT IN AN ORGANIZED HEALTH CARE EDUCATION/TRAINING PROGRAM

## 2023-03-30 PROCEDURE — 3074F SYST BP LT 130 MM HG: CPT | Mod: CPTII,S$GLB,, | Performed by: STUDENT IN AN ORGANIZED HEALTH CARE EDUCATION/TRAINING PROGRAM

## 2023-03-30 PROCEDURE — 86003 ALLG SPEC IGE CRUDE XTRC EA: CPT | Performed by: STUDENT IN AN ORGANIZED HEALTH CARE EDUCATION/TRAINING PROGRAM

## 2023-03-30 PROCEDURE — 99999 PR PBB SHADOW E&M-EST. PATIENT-LVL III: CPT | Mod: PBBFAC,,, | Performed by: STUDENT IN AN ORGANIZED HEALTH CARE EDUCATION/TRAINING PROGRAM

## 2023-03-30 PROCEDURE — 1159F MED LIST DOCD IN RCRD: CPT | Mod: CPTII,S$GLB,, | Performed by: STUDENT IN AN ORGANIZED HEALTH CARE EDUCATION/TRAINING PROGRAM

## 2023-03-30 PROCEDURE — 86003 ALLG SPEC IGE CRUDE XTRC EA: CPT | Mod: 59 | Performed by: STUDENT IN AN ORGANIZED HEALTH CARE EDUCATION/TRAINING PROGRAM

## 2023-03-30 PROCEDURE — 1159F PR MEDICATION LIST DOCUMENTED IN MEDICAL RECORD: ICD-10-PCS | Mod: CPTII,S$GLB,, | Performed by: STUDENT IN AN ORGANIZED HEALTH CARE EDUCATION/TRAINING PROGRAM

## 2023-03-30 PROCEDURE — 3008F BODY MASS INDEX DOCD: CPT | Mod: CPTII,S$GLB,, | Performed by: STUDENT IN AN ORGANIZED HEALTH CARE EDUCATION/TRAINING PROGRAM

## 2023-03-30 PROCEDURE — 99204 OFFICE O/P NEW MOD 45 MIN: CPT | Mod: S$GLB,,, | Performed by: STUDENT IN AN ORGANIZED HEALTH CARE EDUCATION/TRAINING PROGRAM

## 2023-03-30 PROCEDURE — 3078F DIAST BP <80 MM HG: CPT | Mod: CPTII,S$GLB,, | Performed by: STUDENT IN AN ORGANIZED HEALTH CARE EDUCATION/TRAINING PROGRAM

## 2023-03-30 PROCEDURE — 82785 ASSAY OF IGE: CPT | Performed by: STUDENT IN AN ORGANIZED HEALTH CARE EDUCATION/TRAINING PROGRAM

## 2023-03-30 PROCEDURE — 99204 PR OFFICE/OUTPT VISIT, NEW, LEVL IV, 45-59 MIN: ICD-10-PCS | Mod: S$GLB,,, | Performed by: STUDENT IN AN ORGANIZED HEALTH CARE EDUCATION/TRAINING PROGRAM

## 2023-03-30 RX ORDER — MOMETASONE FUROATE 50 UG/1
2 SPRAY, METERED NASAL 2 TIMES DAILY
Qty: 2 EACH | Refills: 5 | Status: SHIPPED | OUTPATIENT
Start: 2023-03-30

## 2023-03-30 NOTE — PATIENT INSTRUCTIONS
Testing  Blood work for allergy testing today       Check [facility] in one week for results or call [phone]       Contact me with questions or concerns       I will contact you if anything needs immediate attention.        Treatment     Nasonex (=mometasone) nasal spray: OR Nasacort   2 squirts each nostril daily   Remember to aim out toward your ear.   Needs to be used regularly 5-14 days for full effect.      Return about 2 weeks

## 2023-03-30 NOTE — PROGRESS NOTES
Allergy Clinic Note  [facility]    This note was created by combination of typed  and M-Modal dictation. Transcription errors may be present.  If there are any questions, please contact me.    HISTORY      Patient ID: Safia Morales is a 32 y.o. female.    Chief Complaint: Nasal Congestion      Referring Provider: Self, Aaareferral       History of Present Illness       Safia Morales is a 32 y.o. female presents complaining of frequent headaches.    Related medications and other interventions  Flonase   Claritin      03/30/2023:  At initial visit, reported an proximally 1-3 year history of nasal congestion.  She also reports alternating nasal blockages and well-localized facial pain just below her medial eyebrow, right greater than left.  Associated symptoms include some burning eyes at nighttime and some ear pressure with ear buds.  She says she often gets sinus infections around [place].  She is not been helped by Allegra, Claritin, or Nasacort used as needed.    Review of systems is notable for  -occasional single lower hiccups  -car sickness helped by micky Rivera or chandler  -change in taste sensation for sour beer and minutes, new within the last 3 years  -hand deramatitis with Latex gloves and also in winter        MEDICAL HISTORY      Significant past medical history:  Anxiety, Asperger's syndrome  Active Problem List reviewed  ENT surgery:  None   Significant family history:  Exposures:  Smoking Hx:  Client  reports that she has never smoked. She has been exposed to tobacco smoke. She has never used smokeless tobacco.    Meds: MAR reviewed    Asthma: No  Eczema: hand dermatitis only  Rhinitis: Yes  Drug allergy/intolerance: NKDA  Venom allergy: No  Latex allergy:  Yes --> rash    Patient Active Problem List   Diagnosis    Patellofemoral pain syndrome of both knees    Stiffness of ankle joint, right    Asperger's syndrome    Anxiety with depression    [place] cardiac risk  <10% in next 10 years    Allergic rhinitis     Medication List with Changes/Refills   New Medications    MOMETASONE (NASONEX) 50 MCG/ACTUATION NASAL SPRAY    2 sprays by Nasal route 2 (two) times daily.       Start Date: 3/30/2023 End Date: --   Current Medications    ALPRAZOLAM (XANAX) 0.25 MG TABLET    Take 1 tablet (0.25 mg total) by mouth 3 (three) times daily as needed for Anxiety.       Start Date: 11/5/2019 End Date: 2/22/2021    FLUTICASONE PROPIONATE (FLONASE) 50 MCG/ACTUATION NASAL SPRAY    1 spray by Each Nostril route once daily.       Start Date: --        End Date: --    LORATADINE (CLARITIN) 10 MG TABLET    Take 10 mg by mouth once daily.       Start Date: --        End Date: --    NORETHINDRONE (MICRONOR) 0.35 MG TABLET    Take 1 tablet (0.35 mg total) by mouth once daily.       Start Date: 11/29/2022End Date: 11/29/2023    SERTRALINE (ZOLOFT) 25 MG TABLET    Take 1 tablet (25 mg total) by mouth once daily.       Start Date: 11/14/2022End Date: --           REVIEW OF SYSTEMS      CONST: no F/C/NS, no unintentional weight changes  NEURO:  no tremor, no weakness  EYES: no discharge, no pruritus, no erythema  EARS: no hearing loss, + sensation of fullness  NOSE: + congestion, no rhinorrhea, no sneezing  PULM:  no SOB, no wheezing, no cough  CV: no CP, no palpitations, no leg swelling  GI:  no abdominal pain, no blood in stool  :  no dysuria, no hematuria  DERM: no rashes, no skin breaks           PHYSICAL EXAM      BP (!) 119/56   Pulse 76   Wt 66.1 kg (145 lb 11.6 oz)   SpO2 100%   BMI 25.81 kg/m²   GEN: Awake and alert, no distress  DERM:  No flushing, dry/rough hands bilaterally  EYE:  No occular discharge, no redness  HEENT: No nasal discharge, no hoarseness, TM's clear bilaterally.  Nares pale with moderate to severe turbinate swelling.  Oropharynx red rimmed but otherwise benign without exudate.  Tongue not coated.  NECK:  FROM, shotty high anterior cervical adenopathy bilaterally  PULM:  Normal work of breathing, no cough, CTA  COR:  RRR, normal pulses  NEURO:  No focal deficit, speech fluent and logical  PSYCH: appropriate affect, normal behavior   EXTREM:  No edema, no deformity              MEDICAL DECISION-MAKING           Data reviewed        Allergy Testing            Lab results      EO count 400, 2022  Peak EO count 800, 2018      Imaging and other diagnostics            Medical records review            Diagnoses:     Safia Morales is a 32 y.o. female. with  1. Chronic rhinitis    2. Hand dermatitis    3. Face pain          Assessment / Plan / Orders   Patient is presenting with adult onset nasal congestion.  Based on appearance of her nasal mucosa, this could well be allergic in nature.  As patient's symptoms warrant daily medication, treatment of choice is nasal steroid used regularly, either Nasacort OTC or Nasonex by prescription.  Taught use      Chronic rhinitis  -     mometasone (NASONEX) 50 mcg/actuation nasal spray; 2 sprays by Nasal route 2 (two) times daily.  Dispense: 2 each; Refill: 5  -     IgE; Future; Expected date: 03/30/2023  -     Dermatophagoides [place]; Future; Expected date: 03/30/2023  -     Dermatophagoides Pteronyssinus; Future; Expected date: 03/30/2023  -     [place]; Future; Expected date: 03/30/2023  -     Nikhil; Future; Expected date: 03/30/2023  -     Mellette; Future; Expected date: 03/30/2023  -     English Plantain; Future; Expected date: 03/30/2023  -     [place] Pecan; Future; Expected date: 03/30/2023  -     Pecan; Future; Expected date: 03/30/2023  -     Ragweed; Future; Expected date: 03/30/2023  -     Alternaria; Future; Expected date: 03/30/2023  -     Aspergillus; Future; Expected date: 03/30/2023  -     Cat; Future; Expected date: 03/30/2023  -     Cockroach; Future; Expected date: 03/30/2023  -     Dog; Future; Expected date: 03/30/2023    Hand dermatitis  -     Cancel: [place] Allergen-Latex; Future; Expected date: 03/30/2023    Face pain over right  frontal sinus with mild tenderness, suspect related to rhinitis        -expect improvement with Nasonex as above        -okay to continue phenylephrine as needed but would avoid Sudafed given history of anxiety          Patient Instructions and follow up     Patient Instructions   Testing  Blood work for allergy testing today       Check [facility] in one week for results or call [phone]       Contact me with questions or concerns       I will contact you if anything needs immediate attention.        Treatment     Nasonex (=mometasone) nasal spray: OR Nasacort   2 squirts each nostril daily   Remember to aim out toward your ear.   Needs to be used regularly 5-14 days for full effect.      Return about 2 weeks            Randee Padgett MD  Allergy, Asthma & Immunology

## 2023-04-03 LAB
A ALTERNATA IGE QN: <0.1 KU/L
A FUMIGATUS IGE QN: <0.1 KU/L
BERMUDA GRASS IGE QN: <0.1 KU/L
CAT DANDER IGE QN: <0.1 KU/L
CEDAR IGE QN: <0.1 KU/L
D FARINAE IGE QN: <0.1 KU/L
D PTERONYSS IGE QN: <0.1 KU/L
DEPRECATED A ALTERNATA IGE RAST QL: NORMAL
DEPRECATED A FUMIGATUS IGE RAST QL: NORMAL
DEPRECATED BERMUDA GRASS IGE RAST QL: NORMAL
DEPRECATED CAT DANDER IGE RAST QL: NORMAL
DEPRECATED CEDAR IGE RAST QL: NORMAL
DEPRECATED D FARINAE IGE RAST QL: NORMAL
DEPRECATED D PTERONYSS IGE RAST QL: NORMAL
DEPRECATED DOG DANDER IGE RAST QL: NORMAL
DEPRECATED ENGL PLANTAIN IGE RAST QL: NORMAL
DEPRECATED PECAN/HICK TREE IGE RAST QL: NORMAL
DEPRECATED ROACH IGE RAST QL: NORMAL
DEPRECATED TIMOTHY IGE RAST QL: NORMAL
DEPRECATED WEST RAGWEED IGE RAST QL: NORMAL
DEPRECATED WHITE OAK IGE RAST QL: NORMAL
DOG DANDER IGE QN: <0.1 KU/L
ENGL PLANTAIN IGE QN: <0.1 KU/L
PECAN/HICK TREE IGE QN: <0.1 KU/L
ROACH IGE QN: <0.1 KU/L
TIMOTHY IGE QN: <0.1 KU/L
WEST RAGWEED IGE QN: <0.1 KU/L
WHITE OAK IGE QN: <0.1 KU/L

## 2023-04-21 ENCOUNTER — OFFICE VISIT (OUTPATIENT)
Dept: ALLERGY | Facility: CLINIC | Age: 33
End: 2023-04-21
Payer: COMMERCIAL

## 2023-04-21 VITALS — HEIGHT: 63 IN | BODY MASS INDEX: 24.98 KG/M2 | WEIGHT: 141 LBS

## 2023-04-21 DIAGNOSIS — J31.0 CHRONIC RHINITIS: Primary | ICD-10-CM

## 2023-04-21 PROCEDURE — 99999 PR PBB SHADOW E&M-EST. PATIENT-LVL III: CPT | Mod: PBBFAC,,, | Performed by: STUDENT IN AN ORGANIZED HEALTH CARE EDUCATION/TRAINING PROGRAM

## 2023-04-21 PROCEDURE — 1159F MED LIST DOCD IN RCRD: CPT | Mod: CPTII,S$GLB,, | Performed by: STUDENT IN AN ORGANIZED HEALTH CARE EDUCATION/TRAINING PROGRAM

## 2023-04-21 PROCEDURE — 3008F BODY MASS INDEX DOCD: CPT | Mod: CPTII,S$GLB,, | Performed by: STUDENT IN AN ORGANIZED HEALTH CARE EDUCATION/TRAINING PROGRAM

## 2023-04-21 PROCEDURE — 3008F PR BODY MASS INDEX (BMI) DOCUMENTED: ICD-10-PCS | Mod: CPTII,S$GLB,, | Performed by: STUDENT IN AN ORGANIZED HEALTH CARE EDUCATION/TRAINING PROGRAM

## 2023-04-21 PROCEDURE — 99999 PR PBB SHADOW E&M-EST. PATIENT-LVL III: ICD-10-PCS | Mod: PBBFAC,,, | Performed by: STUDENT IN AN ORGANIZED HEALTH CARE EDUCATION/TRAINING PROGRAM

## 2023-04-21 PROCEDURE — 1160F PR REVIEW ALL MEDS BY PRESCRIBER/CLIN PHARMACIST DOCUMENTED: ICD-10-PCS | Mod: CPTII,S$GLB,, | Performed by: STUDENT IN AN ORGANIZED HEALTH CARE EDUCATION/TRAINING PROGRAM

## 2023-04-21 PROCEDURE — 1160F RVW MEDS BY RX/DR IN RCRD: CPT | Mod: CPTII,S$GLB,, | Performed by: STUDENT IN AN ORGANIZED HEALTH CARE EDUCATION/TRAINING PROGRAM

## 2023-04-21 PROCEDURE — 1159F PR MEDICATION LIST DOCUMENTED IN MEDICAL RECORD: ICD-10-PCS | Mod: CPTII,S$GLB,, | Performed by: STUDENT IN AN ORGANIZED HEALTH CARE EDUCATION/TRAINING PROGRAM

## 2023-04-21 PROCEDURE — 99214 OFFICE O/P EST MOD 30 MIN: CPT | Mod: S$GLB,,, | Performed by: STUDENT IN AN ORGANIZED HEALTH CARE EDUCATION/TRAINING PROGRAM

## 2023-04-21 PROCEDURE — 99214 PR OFFICE/OUTPT VISIT, EST, LEVL IV, 30-39 MIN: ICD-10-PCS | Mod: S$GLB,,, | Performed by: STUDENT IN AN ORGANIZED HEALTH CARE EDUCATION/TRAINING PROGRAM

## 2023-04-21 NOTE — PROGRESS NOTES
Allergy Clinic Note  [facility]    This note was created by combination of typed  and M-Modal dictation. Transcription errors may be present.  If there are any questions, please contact me.    HISTORY      Patient ID: Safia Morales is a 32 y.o. female.    Chief Complaint: No chief complaint on file.      Referring Provider:         History of Present Illness       Safia Morales is a 32 y.o. female presents complaining of frequent headaches.    Related medications and other interventions  Nasacort AQ 2 squirts each nostril daily      04/21/2023: Overall client reports significant improvement in nasal congestion and face pain.  She did have 1 day of severe blockage in her right nostril with visible swelling and inability to pass nasal spray nozzle.  Today physical exam continues to shows severe turbinates swelling.  Reviewed negative immuno caps.  Recommended increasing Nasacort AQ to twice daily whenever congested.  Follow-up as needed      03/30/2023:  At initial visit, reported an proximally 1-3 year history of nasal congestion.  She also reports alternating nasal blockages and well-localized facial pain just below her medial eyebrow, right greater than left.  Associated symptoms include some burning eyes at nighttime and some ear pressure with ear buds.  She says she often gets sinus infections around [place].  She is not been helped by Allegra, Claritin, or Nasacort used as needed.    Review of systems is notable for  -occasional single lower hiccups  -car sickness helped by micky Rivera or chandler  -change in taste sensation for sour beer and minutes, new within the last 3 years  -hand deramatitis with Latex gloves and also in winter        MEDICAL HISTORY      Significant past medical history:  Anxiety, Asperger's syndrome  Active Problem List reviewed  ENT surgery:  None   Significant family history:  Exposures:  Smoking Hx:  Client  reports that she has never smoked. She  has been exposed to tobacco smoke. She has never used smokeless tobacco.    Meds: MAR reviewed    Asthma: No  Eczema: hand dermatitis only  Rhinitis: Yes  Drug allergy/intolerance: NKDA  Venom allergy: No  Latex allergy:  Yes --> rash    Patient Active Problem List   Diagnosis    Patellofemoral pain syndrome of both knees    Stiffness of ankle joint, right    Asperger's syndrome    Anxiety with depression    [place] cardiac risk <10% in next 10 years    Allergic rhinitis     Medication List with Changes/Refills   Current Medications    ALPRAZOLAM (XANAX) 0.25 MG TABLET    Take 1 tablet (0.25 mg total) by mouth 3 (three) times daily as needed for Anxiety.       Start Date: 11/5/2019 End Date: 2/22/2021    FLUTICASONE PROPIONATE (FLONASE) 50 MCG/ACTUATION NASAL SPRAY    1 spray by Each Nostril route once daily.       Start Date: --        End Date: --    LORATADINE (CLARITIN) 10 MG TABLET    Take 10 mg by mouth once daily.       Start Date: --        End Date: --    MOMETASONE (NASONEX) 50 MCG/ACTUATION NASAL SPRAY    2 sprays by Nasal route 2 (two) times daily.       Start Date: 3/30/2023 End Date: --    NORETHINDRONE (MICRONOR) 0.35 MG TABLET    Take 1 tablet (0.35 mg total) by mouth once daily.       Start Date: 11/29/2022End Date: 11/29/2023    SERTRALINE (ZOLOFT) 25 MG TABLET    Take 1 tablet (25 mg total) by mouth once daily.       Start Date: 11/14/2022End Date: --           REVIEW OF SYSTEMS      CONST: no F/C/NS, no unintentional weight changes  NEURO:  no tremor, no weakness  EYES: no discharge, no pruritus, no erythema  EARS: no hearing loss, + sensation of fullness  NOSE: + congestion, no rhinorrhea, no sneezing  PULM:  no SOB, no wheezing, no cough  CV: no CP, no palpitations, no leg swelling  GI:  no abdominal pain, no blood in stool  :  no dysuria, no hematuria  DERM: no rashes, no skin breaks    Answers submitted by the patient for this visit:  Allergy and Asthma Questionnaire  (Submitted on  "4/21/2023)  facial swelling: No  Sinus pain?: Yes  sinus pressure : Yes  ear discharge: No  ear pain: No  hearing loss: No  nosebleeds: No  postnasal drip: No  sneezing: Yes  runny nose: No  congestion: Yes  sore throat: No  trouble swallowing: No  voice change: No  eye itching: No  eye redness: No  eye discharge: No  eye pain: Yes  Light sensitivity / light hurts the eyes?: No  cough: Yes  wheezing: No  shortness of breath: No  apnea: No  choking: No  chest tightness: No  rash: No  color change : No           PHYSICAL EXAM      Ht 5' 3" (1.6 m)   Wt 64 kg (141 lb)   BMI 24.98 kg/m²   GEN: Awake and alert, no distress  DERM:  No flushing, dry/rough hands bilaterally,   EYE:  No occular discharge, no redness  HEENT: No nasal discharge, no hoarseness, Nares are pink with severe turbinate swelling.  Oropharynx is benign without exudate.  Tongue is not coated.  NECK:  No LAD  PULM: Normal work of breathing, no cough,   NEURO:  No focal deficit, speech fluent and logical  PSYCH: appropriate affect, normal behavior   EXTREM:  No edema, no deformity          MEDICAL DECISION-MAKING           Data reviewed        Allergy Testing      Negative inhalant ImmunoCAP, 2023      Lab results      Total IgE less than 35, 2023  EO count 400, 2022  Peak EO count 800, 2018      Imaging and other diagnostics            Medical records review            Diagnoses:     Safia Morales is a 32 y.o. female. with  No diagnosis found.        Assessment / Plan / Orders   Patient is presenting with adult onset nasal congestion.  Is no evidence of underlying allergies by ImmunoCAP testing.  Discussed pathogenesis of vasomotor rhinitis briefly.  Recommended continuing Nasacort AQ once or twice daily.    Chronic rhinitis with negative Immunocap       -continue Nasacort 2 squirts each nostril once or twice daily      Patient Instructions and follow up     Patient Instructions       Continue Nasacort 2 squirts each nostril once or twice " daily    Follow-up as needed      Randee Padgett MD  Allergy, Asthma & Immunology

## 2023-04-30 NOTE — PATIENT INSTRUCTIONS
Information about cholesterol, high blood pressure and healthy diet and activity recommendations can be found at the following links on the Internet:    http://www.nhlbi.nih.gov/health/health-topics/topics/hbc  http://www.nhlbi.nih.gov/health/educational/lose_wt/index.htm  Http://www.nhlbi.nih.gov/files/docs/public/heart/hbp_low.pdf  http://www.heart.org/HEARTORG/  http://diabetes.org/  https://www.cdc.gov/  Https://healthfinder.gov/  https://health.gov/dietaryguidelines/2015/guidelines/  https://health.gov/paguidelines/second-edition/pdf/Physical_Activity_Guidelines_2nd_edition.pdf     3 = A little assistance

## 2023-05-22 ENCOUNTER — PATIENT MESSAGE (OUTPATIENT)
Dept: INFECTIOUS DISEASES | Facility: CLINIC | Age: 33
End: 2023-05-22
Payer: COMMERCIAL

## 2023-07-12 ENCOUNTER — OFFICE VISIT (OUTPATIENT)
Dept: INTERNAL MEDICINE | Facility: CLINIC | Age: 33
End: 2023-07-12
Payer: COMMERCIAL

## 2023-07-12 ENCOUNTER — LAB VISIT (OUTPATIENT)
Dept: LAB | Facility: HOSPITAL | Age: 33
End: 2023-07-12
Payer: COMMERCIAL

## 2023-07-12 ENCOUNTER — TELEPHONE (OUTPATIENT)
Dept: UROGYNECOLOGY | Facility: CLINIC | Age: 33
End: 2023-07-12
Payer: COMMERCIAL

## 2023-07-12 VITALS
SYSTOLIC BLOOD PRESSURE: 118 MMHG | WEIGHT: 145.5 LBS | DIASTOLIC BLOOD PRESSURE: 75 MMHG | HEART RATE: 66 BPM | BODY MASS INDEX: 25.78 KG/M2 | HEIGHT: 63 IN

## 2023-07-12 DIAGNOSIS — Z00.00 ROUTINE GENERAL MEDICAL EXAMINATION AT A HEALTH CARE FACILITY: Primary | ICD-10-CM

## 2023-07-12 DIAGNOSIS — Z00.00 ROUTINE GENERAL MEDICAL EXAMINATION AT A HEALTH CARE FACILITY: ICD-10-CM

## 2023-07-12 LAB
BASOPHILS # BLD AUTO: 0.07 K/UL (ref 0–0.2)
BASOPHILS NFR BLD: 0.8 % (ref 0–1.9)
DIFFERENTIAL METHOD: ABNORMAL
EOSINOPHIL # BLD AUTO: 0.3 K/UL (ref 0–0.5)
EOSINOPHIL NFR BLD: 3.8 % (ref 0–8)
ERYTHROCYTE [DISTWIDTH] IN BLOOD BY AUTOMATED COUNT: 12.3 % (ref 11.5–14.5)
ESTIMATED AVG GLUCOSE: 91 MG/DL (ref 68–131)
HBA1C MFR BLD: 4.8 % (ref 4–5.6)
HCT VFR BLD AUTO: 43.9 % (ref 37–48.5)
HGB BLD-MCNC: 14.7 G/DL (ref 12–16)
IMM GRANULOCYTES # BLD AUTO: 0.11 K/UL (ref 0–0.04)
IMM GRANULOCYTES NFR BLD AUTO: 1.3 % (ref 0–0.5)
LYMPHOCYTES # BLD AUTO: 2.1 K/UL (ref 1–4.8)
LYMPHOCYTES NFR BLD: 24.6 % (ref 18–48)
MCH RBC QN AUTO: 32.2 PG (ref 27–31)
MCHC RBC AUTO-ENTMCNC: 33.5 G/DL (ref 32–36)
MCV RBC AUTO: 96 FL (ref 82–98)
MONOCYTES # BLD AUTO: 0.7 K/UL (ref 0.3–1)
MONOCYTES NFR BLD: 8.5 % (ref 4–15)
NEUTROPHILS # BLD AUTO: 5.1 K/UL (ref 1.8–7.7)
NEUTROPHILS NFR BLD: 61 % (ref 38–73)
NRBC BLD-RTO: 0 /100 WBC
PLATELET # BLD AUTO: 289 K/UL (ref 150–450)
PLATELET BLD QL SMEAR: ABNORMAL
PMV BLD AUTO: ABNORMAL FL (ref 9.2–12.9)
RBC # BLD AUTO: 4.57 M/UL (ref 4–5.4)
WBC # BLD AUTO: 8.32 K/UL (ref 3.9–12.7)

## 2023-07-12 PROCEDURE — 3008F PR BODY MASS INDEX (BMI) DOCUMENTED: ICD-10-PCS | Mod: CPTII,S$GLB,,

## 2023-07-12 PROCEDURE — 36415 COLL VENOUS BLD VENIPUNCTURE: CPT

## 2023-07-12 PROCEDURE — 3044F PR MOST RECENT HEMOGLOBIN A1C LEVEL <7.0%: ICD-10-PCS | Mod: CPTII,S$GLB,,

## 2023-07-12 PROCEDURE — 3008F BODY MASS INDEX DOCD: CPT | Mod: CPTII,S$GLB,,

## 2023-07-12 PROCEDURE — 80061 LIPID PANEL: CPT

## 2023-07-12 PROCEDURE — 85025 COMPLETE CBC W/AUTO DIFF WBC: CPT

## 2023-07-12 PROCEDURE — 99395 PR PREVENTIVE VISIT,EST,18-39: ICD-10-PCS | Mod: S$GLB,,,

## 2023-07-12 PROCEDURE — 80053 COMPREHEN METABOLIC PANEL: CPT

## 2023-07-12 PROCEDURE — 99395 PREV VISIT EST AGE 18-39: CPT | Mod: S$GLB,,,

## 2023-07-12 PROCEDURE — 3078F DIAST BP <80 MM HG: CPT | Mod: CPTII,S$GLB,,

## 2023-07-12 PROCEDURE — 3074F PR MOST RECENT SYSTOLIC BLOOD PRESSURE < 130 MM HG: ICD-10-PCS | Mod: CPTII,S$GLB,,

## 2023-07-12 PROCEDURE — 3074F SYST BP LT 130 MM HG: CPT | Mod: CPTII,S$GLB,,

## 2023-07-12 PROCEDURE — 83036 HEMOGLOBIN GLYCOSYLATED A1C: CPT

## 2023-07-12 PROCEDURE — 3044F HG A1C LEVEL LT 7.0%: CPT | Mod: CPTII,S$GLB,,

## 2023-07-12 PROCEDURE — 99999 PR PBB SHADOW E&M-EST. PATIENT-LVL III: ICD-10-PCS | Mod: PBBFAC,,,

## 2023-07-12 PROCEDURE — 99999 PR PBB SHADOW E&M-EST. PATIENT-LVL III: CPT | Mod: PBBFAC,,,

## 2023-07-12 PROCEDURE — 3078F PR MOST RECENT DIASTOLIC BLOOD PRESSURE < 80 MM HG: ICD-10-PCS | Mod: CPTII,S$GLB,,

## 2023-07-12 NOTE — PROGRESS NOTES
Subjective     Chief Complaint: annual physical    History of Present Illness:  Ms. Safia Morales is a 32 y.o. female with anxiety and depression who presents to clinic for her annual physical.     Diet: pizza, once a week eats out  Exercise: rides bike to work (8 miles a day)   Tobacco use: never  Alcohol use: social  Drug use: never  Sex: yes, male partner, OCP  Occupation:   Living: house, with parents    Colorectal Ca: --NA for age.  Lung ca: --NA  BP--cont to monitor  Depression--none  Type 2 DM--screen  Hep C--screen  Lipids--screen  HIV--not at risk  Obesity--cont to monitor  Prostate --na  STD--not at risk  TB--not at risk  Vision--check annually      Review of Systems   Constitutional:  Negative for chills and fever.   HENT:  Negative for sore throat.    Respiratory:  Negative for shortness of breath, wheezing and stridor.    Cardiovascular:  Negative for chest pain, palpitations and leg swelling.   Gastrointestinal:  Negative for abdominal pain, blood in stool, constipation, diarrhea, nausea and vomiting.   Genitourinary:  Negative for dysuria, flank pain, frequency and hematuria.   Musculoskeletal:  Negative for back pain, joint pain and neck pain.   Neurological:  Negative for dizziness, focal weakness, weakness and headaches.   Endo/Heme/Allergies:  Does not bruise/bleed easily.   Psychiatric/Behavioral:  Negative for memory loss. The patient does not have insomnia.      PAST HISTORY:     Past Medical History:   Diagnosis Date    Allergy     Anxiety     Depression        Past Surgical History:   Procedure Laterality Date    STRABISMUS SURGERY      x 2    WISDOM TOOTH EXTRACTION  2005       Family History   Problem Relation Age of Onset    Allergies Mother     No Known Problems Father     Asthma Brother     Diabetes Paternal Uncle     Cancer Paternal Uncle     Scleroderma Maternal Grandmother     Diabetes Maternal Grandfather     Hashimoto's thyroiditis Other        Social History      Socioeconomic History    Marital status: Single    Number of children: 0   Occupational History     Comment: [employer]   Tobacco Use    Smoking status: Never     Passive exposure: Past    Smokeless tobacco: Never   Substance and Sexual Activity    Alcohol use: Yes     Alcohol/week: 1.0 standard drink     Types: 1 Glasses of wine per week    Drug use: No    Sexual activity: Never     Partners: Male   Social History Narrative    The patient does exercise regularly (rides bike to work ~8mi/day).          She is not satisfied with weight.    Rates diet as fair to poor.    She does drink at least 1/2 gallon water daily.    She drinks 0-1 coffee/tea/caffeine-containing soft drinks daily.    Total sleep time at night is 8-9 hours.    She works 40 hours per week.    She does wear seat belts.    Hobbies include sewing, embroidery, reading     Social Determinants of Health     Financial Resource Strain: Low Risk     Difficulty of Paying Living Expenses: Not hard at all   Food Insecurity: No Food Insecurity    Worried About Running Out of Food in the Last Year: Never true    Ran Out of Food in the Last Year: Never true   Transportation Needs: Unmet Transportation Needs    Lack of Transportation (Medical): Yes    Lack of Transportation (Non-Medical): No   Physical Activity: Sufficiently Active    Days of Exercise per Week: 5 days    Minutes of Exercise per Session: 40 min   Stress: No Stress Concern Present    Feeling of Stress : Only a little   Social Connections: Unknown    Frequency of Communication with Friends and Family: Never    Frequency of Social Gatherings with Friends and Family: Patient refused    Active Member of Clubs or Organizations: No    Attends Club or Organization Meetings: Patient refused    Marital Status: Never    Housing Stability: Low Risk     Unable to Pay for Housing in the Last Year: No    Number of Places Lived in the Last Year: 1    Unstable Housing in the Last Year: No  "      MEDICATIONS & ALLERGIES:     Current Outpatient Medications on File Prior to Visit   Medication Sig    ALPRAZolam (XANAX) 0.25 MG tablet Take 1 tablet (0.25 mg total) by mouth 3 (three) times daily as needed for Anxiety. (Patient not taking: Reported on 3/30/2023)    fluticasone propionate (FLONASE) 50 mcg/actuation nasal spray 1 spray by Each Nostril route once daily.    loratadine (CLARITIN) 10 mg tablet Take 10 mg by mouth once daily.    mometasone (NASONEX) 50 mcg/actuation nasal spray 2 sprays by Nasal route 2 (two) times daily.    norethindrone (MICRONOR) 0.35 mg tablet Take 1 tablet (0.35 mg total) by mouth once daily.    sertraline (ZOLOFT) 25 MG tablet Take 1 tablet (25 mg total) by mouth once daily.     No current facility-administered medications on file prior to visit.       Review of patient's allergies indicates:   Allergen Reactions    Latex, natural rubber Rash       OBJECTIVE:     Vital Signs:  Vitals:    07/12/23 1554   BP: 118/75   Pulse: 66   Weight: 66 kg (145 lb 8.1 oz)   Height: 5' 3" (1.6 m)       Body mass index is 25.77 kg/m².     Physical Exam  Vitals and nursing note reviewed.   Constitutional:       General: She is not in acute distress.     Appearance: Normal appearance. She is not ill-appearing.   HENT:      Head: Normocephalic and atraumatic.      Right Ear: External ear normal.      Left Ear: External ear normal.      Nose: Nose normal. No rhinorrhea.      Mouth/Throat:      Mouth: Mucous membranes are moist.      Pharynx: Oropharynx is clear.   Eyes:      General:         Right eye: No discharge.         Left eye: No discharge.      Extraocular Movements: Extraocular movements intact.      Conjunctiva/sclera: Conjunctivae normal.   Cardiovascular:      Rate and Rhythm: Normal rate and regular rhythm.      Heart sounds: No murmur heard.  Pulmonary:      Effort: Pulmonary effort is normal. No respiratory distress.      Breath sounds: Normal breath sounds. No wheezing or rales. "   Musculoskeletal:         General: No swelling. Normal range of motion.      Cervical back: Normal range of motion and neck supple.      Right lower leg: No edema.      Left lower leg: No edema.   Skin:     General: Skin is warm and dry.      Coloration: Skin is not jaundiced or pale.   Neurological:      General: No focal deficit present.      Mental Status: She is alert and oriented to person, place, and time.      Motor: No weakness.      Gait: Gait normal.   Psychiatric:         Mood and Affect: Mood normal.         Behavior: Behavior normal.         Thought Content: Thought content normal.     Laboratory  Lab Results   Component Value Date    WBC 3.88 (L) 10/27/2022    HGB 14.8 10/27/2022    HCT 44.7 10/27/2022    MCV 94 10/27/2022     10/27/2022     @CVMKORCIE26(GLU,NA,K,Cl,CO2,BUN,Creatinine,Calcium,MG)@  No results found for: INR, PROTIME  Lab Results   Component Value Date    HGBA1C 5.0 11/04/2019     No results for input(s): POCTGLUCOSE in the last 72 hours.    Diagnostic Results:  Labs: Reviewed      ASSESSMENT & PLAN:   Ms. Safia Morales is a 32 y.o. female who presents today with   Safia was seen today for annual exam.    Diagnoses and all orders for this visit:    Routine general medical examination at a health care facility  -     CBC W/ AUTO DIFFERENTIAL; Future  -     COMPREHENSIVE METABOLIC PANEL; Future  -     LIPID PANEL; Future  -     Hemoglobin A1C; Future              RTC in 12 months    Case discussed with Dr Tammi Carter MD  Internal Medicine PGY3  [facility]  [address] [place] [id number]  [phone]

## 2023-07-12 NOTE — TELEPHONE ENCOUNTER
----- Message from Niki Botello sent at 7/12/2023 12:05 PM CDT -----  Regarding: call back  Name of caller: Randee ( [employer] [facility])       What is the requesting detail: Melinda is requesting a call back. States pt was turned away from her annual appt due appt being scheduled too soon. Randee states it in fact,not too soon and the pt can be seen anytime between January  and December per  her  benefits package.  Please call [phone] if further discussion is needed.       Can the clinic reply by [facility]:       What number to call back:    [place] [phone]

## 2023-07-13 LAB
ALBUMIN SERPL BCP-MCNC: 4.1 G/DL (ref 3.5–5.2)
ALP SERPL-CCNC: 69 U/L (ref 55–135)
ALT SERPL W/O P-5'-P-CCNC: 24 U/L (ref 10–44)
ANION GAP SERPL CALC-SCNC: 6 MMOL/L (ref 8–16)
AST SERPL-CCNC: 22 U/L (ref 10–40)
BILIRUB SERPL-MCNC: 0.6 MG/DL (ref 0.1–1)
BUN SERPL-MCNC: 10 MG/DL (ref 6–20)
CALCIUM SERPL-MCNC: 9.4 MG/DL (ref 8.7–10.5)
CHLORIDE SERPL-SCNC: 106 MMOL/L (ref 95–110)
CHOLEST SERPL-MCNC: 236 MG/DL (ref 120–199)
CHOLEST/HDLC SERPL: 5 {RATIO} (ref 2–5)
CO2 SERPL-SCNC: 24 MMOL/L (ref 23–29)
CREAT SERPL-MCNC: 0.8 MG/DL (ref 0.5–1.4)
EST. GFR  (NO RACE VARIABLE): >60 ML/MIN/1.73 M^2
GLUCOSE SERPL-MCNC: 94 MG/DL (ref 70–110)
HDLC SERPL-MCNC: 47 MG/DL (ref 40–75)
HDLC SERPL: 19.9 % (ref 20–50)
LDLC SERPL CALC-MCNC: 156.8 MG/DL (ref 63–159)
NONHDLC SERPL-MCNC: 189 MG/DL
POTASSIUM SERPL-SCNC: 3.7 MMOL/L (ref 3.5–5.1)
PROT SERPL-MCNC: 7.5 G/DL (ref 6–8.4)
SODIUM SERPL-SCNC: 136 MMOL/L (ref 136–145)
TRIGL SERPL-MCNC: 161 MG/DL (ref 30–150)

## 2023-07-26 ENCOUNTER — OFFICE VISIT (OUTPATIENT)
Dept: UROGYNECOLOGY | Facility: CLINIC | Age: 33
End: 2023-07-26
Payer: COMMERCIAL

## 2023-07-26 VITALS
HEIGHT: 63 IN | SYSTOLIC BLOOD PRESSURE: 120 MMHG | DIASTOLIC BLOOD PRESSURE: 70 MMHG | BODY MASS INDEX: 25.55 KG/M2 | WEIGHT: 144.19 LBS

## 2023-07-26 DIAGNOSIS — Z01.419 WELL WOMAN EXAM: Primary | ICD-10-CM

## 2023-07-26 DIAGNOSIS — N92.3 INTERMENSTRUAL BLEEDING: ICD-10-CM

## 2023-07-26 PROCEDURE — 3044F PR MOST RECENT HEMOGLOBIN A1C LEVEL <7.0%: ICD-10-PCS | Mod: CPTII,S$GLB,, | Performed by: NURSE PRACTITIONER

## 2023-07-26 PROCEDURE — 3078F PR MOST RECENT DIASTOLIC BLOOD PRESSURE < 80 MM HG: ICD-10-PCS | Mod: CPTII,S$GLB,, | Performed by: NURSE PRACTITIONER

## 2023-07-26 PROCEDURE — 1160F PR REVIEW ALL MEDS BY PRESCRIBER/CLIN PHARMACIST DOCUMENTED: ICD-10-PCS | Mod: CPTII,S$GLB,, | Performed by: NURSE PRACTITIONER

## 2023-07-26 PROCEDURE — 99999 PR PBB SHADOW E&M-EST. PATIENT-LVL III: CPT | Mod: PBBFAC,,, | Performed by: NURSE PRACTITIONER

## 2023-07-26 PROCEDURE — 1159F PR MEDICATION LIST DOCUMENTED IN MEDICAL RECORD: ICD-10-PCS | Mod: CPTII,S$GLB,, | Performed by: NURSE PRACTITIONER

## 2023-07-26 PROCEDURE — 3078F DIAST BP <80 MM HG: CPT | Mod: CPTII,S$GLB,, | Performed by: NURSE PRACTITIONER

## 2023-07-26 PROCEDURE — 99395 PR PREVENTIVE VISIT,EST,18-39: ICD-10-PCS | Mod: S$GLB,,, | Performed by: NURSE PRACTITIONER

## 2023-07-26 PROCEDURE — 3044F HG A1C LEVEL LT 7.0%: CPT | Mod: CPTII,S$GLB,, | Performed by: NURSE PRACTITIONER

## 2023-07-26 PROCEDURE — 1159F MED LIST DOCD IN RCRD: CPT | Mod: CPTII,S$GLB,, | Performed by: NURSE PRACTITIONER

## 2023-07-26 PROCEDURE — 3008F BODY MASS INDEX DOCD: CPT | Mod: CPTII,S$GLB,, | Performed by: NURSE PRACTITIONER

## 2023-07-26 PROCEDURE — 3074F SYST BP LT 130 MM HG: CPT | Mod: CPTII,S$GLB,, | Performed by: NURSE PRACTITIONER

## 2023-07-26 PROCEDURE — 99999 PR PBB SHADOW E&M-EST. PATIENT-LVL III: ICD-10-PCS | Mod: PBBFAC,,, | Performed by: NURSE PRACTITIONER

## 2023-07-26 PROCEDURE — 99395 PREV VISIT EST AGE 18-39: CPT | Mod: S$GLB,,, | Performed by: NURSE PRACTITIONER

## 2023-07-26 PROCEDURE — 3074F PR MOST RECENT SYSTOLIC BLOOD PRESSURE < 130 MM HG: ICD-10-PCS | Mod: CPTII,S$GLB,, | Performed by: NURSE PRACTITIONER

## 2023-07-26 PROCEDURE — 3008F PR BODY MASS INDEX (BMI) DOCUMENTED: ICD-10-PCS | Mod: CPTII,S$GLB,, | Performed by: NURSE PRACTITIONER

## 2023-07-26 PROCEDURE — 1160F RVW MEDS BY RX/DR IN RCRD: CPT | Mod: CPTII,S$GLB,, | Performed by: NURSE PRACTITIONER

## 2023-07-26 RX ORDER — ACETAMINOPHEN AND CODEINE PHOSPHATE 120; 12 MG/5ML; MG/5ML
1 SOLUTION ORAL DAILY
Qty: 84 TABLET | Refills: 4 | Status: SHIPPED | OUTPATIENT
Start: 2023-07-26 | End: 2024-07-25

## 2023-07-26 NOTE — PROGRESS NOTES
07/26/2023    SUBJECTIVE:   33 y.o. female for annual exam.      Past Medical History:   Diagnosis Date    Allergy     Anxiety     Depression        Past Surgical History:   Procedure Laterality Date    STRABISMUS SURGERY      x 2    WISDOM TOOTH EXTRACTION  2005       Family History   Problem Relation Age of Onset    Allergies Mother     No Known Problems Father     Asthma Brother     Diabetes Paternal Uncle     Cancer Paternal Uncle     Scleroderma Maternal Grandmother     Diabetes Maternal Grandfather     Hashimoto's thyroiditis Other        Social History     Socioeconomic History    Marital status: Single    Number of children: 0   Occupational History     Comment: [employer]   Tobacco Use    Smoking status: Never     Passive exposure: Past    Smokeless tobacco: Never   Substance and Sexual Activity    Alcohol use: Yes     Alcohol/week: 1.0 standard drink     Types: 1 Glasses of wine per week     Comment: social    Drug use: No    Sexual activity: Yes     Partners: Male     Birth control/protection: OCP   Social History Narrative    The patient does exercise regularly (rides bike to work ~8mi/day).          She is not satisfied with weight.    Rates diet as fair to poor.    She does drink at least 1/2 gallon water daily.    She drinks 0-1 coffee/tea/caffeine-containing soft drinks daily.    Total sleep time at night is 8-9 hours.    She works 40 hours per week.    She does wear seat belts.    Hobbies include sewing, embroidery, reading     Social Determinants of Health     Financial Resource Strain: Low Risk     Difficulty of Paying Living Expenses: Not hard at all   Food Insecurity: No Food Insecurity    Worried About Running Out of Food in the Last Year: Never true    Ran Out of Food in the Last Year: Never true   Transportation Needs: Unmet Transportation Needs    Lack of Transportation (Medical): Yes    Lack of Transportation (Non-Medical): No   Physical Activity: Sufficiently Active    Days of Exercise  per Week: 5 days    Minutes of Exercise per Session: 40 min   Stress: No Stress Concern Present    Feeling of Stress : Only a little   Social Connections: Unknown    Frequency of Communication with Friends and Family: Never    Frequency of Social Gatherings with Friends and Family: Patient refused    Active Member of Clubs or Organizations: No    Attends Club or Organization Meetings: Patient refused    Marital Status: Never    Housing Stability: Low Risk     Unable to Pay for Housing in the Last Year: No    Number of Places Lived in the Last Year: 1    Unstable Housing in the Last Year: No       Current Outpatient Medications   Medication Sig Dispense Refill    mometasone (NASONEX) 50 mcg/actuation nasal spray 2 sprays by Nasal route 2 (two) times daily. 2 each 5    sertraline (ZOLOFT) 25 MG tablet Take 1 tablet (25 mg total) by mouth once daily. 90 tablet 3    norethindrone (MICRONOR) 0.35 mg tablet Take 1 tablet (0.35 mg total) by mouth once daily. 84 tablet 4     No current facility-administered medications for this visit.       Review of patient's allergies indicates:   Allergen Reactions    Latex, natural rubber Rash          Regular periods- last 3-4 days.  Around every 28 days  Denies intramenstrual bleeding    2022 pelvic ultrasound normal      Well Woman:  Pap:2022 normal, negative HPV        Family History  Family History   Problem Relation Age of Onset    Allergies Mother     No Known Problems Father     Asthma Brother     Diabetes Paternal Uncle     Cancer Paternal Uncle     Scleroderma Maternal Grandmother     Diabetes Maternal Grandfather     Hashimoto's thyroiditis Other       Colon CA: No  Breast CA: No  GYN CA: No   CA: No      ROS:  Feeling well.   No dyspnea or chest pain on exertion.    No abdominal pain, change in bowel habits, black or bloody stools.    No urinary tract symptoms.   GYN ROS: intrmentrual spotting around ovulation, no abnormal bleeding, pelvic pain or  "discharge, no breast pain or new or enlarging lumps on self exam.   No neurological complaints.    OBJECTIVE:   The patient appears well, alert, oriented x 3, in no distress.  /70 (BP Location: Left arm, Patient Position: Sitting, BP Method: Medium (Manual))   Ht 5' 3" (1.6 m)   Wt 65.4 kg (144 lb 2.9 oz)   BMI 25.54 kg/m²   ENT normal.  Neck supple. No adenopathy, thyroid is enlarged. RY.   Normal respiratory effort  Heart with regular rate and rhythm.   Abdomen soft without tenderness, guarding, mass or organomegaly.   Extremities show no edema, normal peripheral pulses.   Neurological is normal, no focal findings.    BREAST EXAM: breasts appear normal, no suspicious masses, no skin or nipple changes or axillary nodes.  L nipple inverted    PELVIC EXAM:   VULVA: normal appearing vulva with no masses, tenderness or lesions,   VAGINA: normal appearing vagina with normal color and discharge, no lesions,  CERVIX: normal appearing cervix without discharge or lesions,   UTERUS: uterus is normal size, shape, consistency and nontender,   ADNEXA: no masses,   RECTAL: deferred    ASSESSMENT:   1. Well woman exam        2. Intermenstrual bleeding  norethindrone (MICRONOR) 0.35 mg tablet              PLAN:     1. Well woman  --up to date    2.hx intramenstrual spotting  --continue micronor    3.  RTC 1 year for annual      30 minutes were spent in face to face time with this patient  90 % of this time was spent in counseling and/or coordination of care    Lolis PINA Marchand [facility]  Division of Female Pelvic Medicine and Reconstructive Surgery  Department of Obstetrics & Gynecology          "

## 2023-09-30 ENCOUNTER — PATIENT MESSAGE (OUTPATIENT)
Dept: INTERNAL MEDICINE | Facility: CLINIC | Age: 33
End: 2023-09-30
Payer: COMMERCIAL

## 2023-09-30 DIAGNOSIS — F41.8 ANXIETY WITH DEPRESSION: ICD-10-CM

## 2023-10-02 RX ORDER — SERTRALINE HYDROCHLORIDE 25 MG/1
25 TABLET, FILM COATED ORAL DAILY
Qty: 90 TABLET | Refills: 3 | Status: SHIPPED | OUTPATIENT
Start: 2023-10-02

## 2023-10-14 ENCOUNTER — PATIENT MESSAGE (OUTPATIENT)
Dept: INTERNAL MEDICINE | Facility: CLINIC | Age: 33
End: 2023-10-14
Payer: COMMERCIAL

## 2024-01-23 ENCOUNTER — PATIENT MESSAGE (OUTPATIENT)
Dept: UROGYNECOLOGY | Facility: CLINIC | Age: 34
End: 2024-01-23
Payer: COMMERCIAL

## 2024-01-23 DIAGNOSIS — N93.9 ABNORMAL UTERINE BLEEDING: Primary | ICD-10-CM

## 2024-04-17 NOTE — PROGRESS NOTES
Urogyn follow up  [unfilled]  .  [facility], [place] - UROGYNECOLOGY  [address] [address]    Safia Morales  [id number]  1990      Safia Morales is a 33 y.o.  for a urogyn follow up for intramenstrual spotting.    The patient location is: [place]  The chief complaint leading to consultation is: intramenstrual spotting    Visit type: audiovisual        Each patient to whom he or she provides medical services by telemedicine is:  (1) informed of the relationship between the physician and patient and the respective role of any other health care provider with respect to management of the patient; and (2) notified that he or she may decline to receive medical services by telemedicine and may withdraw from such care at any time.    Notes:     Pelvic ultrasound:  01/24/2024     Media Information            Stopped micornor in February.  Menses 2/22, 3/23, 4/14--has not had intramenstrual bleeding since stopping.   Using condoms  Denies complaints since stopping micronor    Past Medical History:   Diagnosis Date    Allergy     Anxiety     Depression        Past Surgical History:   Procedure Laterality Date    STRABISMUS SURGERY      x 2    WISDOM TOOTH EXTRACTION  2005       Family History   Problem Relation Name Age of Onset    Allergies Mother      No Known Problems Father      Asthma Brother      Diabetes Paternal Uncle lymphoma     Cancer Paternal Uncle lymphoma     Scleroderma Maternal Grandmother      Diabetes Maternal Grandfather      Hashimoto's thyroiditis Other COUSIN        Social History     Socioeconomic History    Marital status: Single    Number of children: 0   Occupational History     Comment: [employer]   Tobacco Use    Smoking status: Never     Passive exposure: Past    Smokeless tobacco: Never   Substance and Sexual Activity    Alcohol use: Yes     Alcohol/week: 1.0 standard drink of alcohol     Types: 1 Glasses of wine per week     Comment: social    Drug use: No    Sexual  activity: Yes     Partners: Male     Birth control/protection: OCP   Social History Narrative    The patient does exercise regularly (rides bike to work ~8mi/day).          She is not satisfied with weight.    Rates diet as fair to poor.    She does drink at least 1/2 gallon water daily.    She drinks 0-1 coffee/tea/caffeine-containing soft drinks daily.    Total sleep time at night is 8-9 hours.    She works 40 hours per week.    She does wear seat belts.    Hobbies include sewing, embroidery, reading     Social Determinants of Health     Financial Resource Strain: Low Risk  (10/23/2022)    Overall Financial Resource Strain (CARDIA)     Difficulty of Paying Living Expenses: Not hard at all   Food Insecurity: No Food Insecurity (10/23/2022)    Hunger Vital Sign     Worried About Running Out of Food in the Last Year: Never true     Ran Out of Food in the Last Year: Never true   Transportation Needs: Unmet Transportation Needs (10/23/2022)    PRAPARE - Transportation     Lack of Transportation (Medical): Yes     Lack of Transportation (Non-Medical): No   Physical Activity: Sufficiently Active (10/23/2022)    Exercise Vital Sign     Days of Exercise per Week: 5 days     Minutes of Exercise per Session: 40 min   Stress: No Stress Concern Present (10/23/2022)    [employer] [place] [employer] [employer] - Occupational Stress Questionnaire     Feeling of Stress : Only a little   Social Connections: Unknown (10/23/2022)    Social Connection and Isolation Panel [NHANES]     Frequency of Communication with Friends and Family: Never     Frequency of Social Gatherings with Friends and Family: Patient declined     Active Member of Clubs or Organizations: No     Attends Club or Organization Meetings: Patient declined     Marital Status: Never    Housing Stability: Low Risk  (10/23/2022)    Housing Stability Vital Sign     Unable to Pay for Housing in the Last Year: No     Number of Places Lived in the Last Year: 1      Unstable Housing in the Last Year: No       Current Outpatient Medications   Medication Sig Dispense Refill    mometasone (NASONEX) 50 mcg/actuation nasal spray 2 sprays by Nasal route 2 (two) times daily. 2 each 5    norethindrone (MICRONOR) 0.35 mg tablet Take 1 tablet (0.35 mg total) by mouth once daily. 84 tablet 4    sertraline (ZOLOFT) 25 MG tablet Take 1 tablet (25 mg total) by mouth once daily. 90 tablet 3     No current facility-administered medications for this visit.       Review of patient's allergies indicates:   Allergen Reactions    Latex, natural rubber Rash       Well woman:  Pap:Pap:09/2022 normal, negative HPV       ROS:  As per HPI.      Exam  There were no vitals taken for this visit.  General: alert and oriented, no acute distress  Respiratory: normal respiratory effort  Abd: soft, non-tender, non-distended    Pelvic--deferred    Impression  1. Intermenstrual bleeding          We reviewed the above issues and discussed options for short-term versus long-term management of her problems.   Plan:     Intramenstrual spotting  --resolved off of micronor  --please keep track of menses  --call if cycles are less than 21 days more than once or last longer than 10 days    2. RTC for annual exam in July    Face to Face time with patient: 10  20 minutes of total time spent on the encounter, which includes face to face time and non-face to face time preparing to see the patient (eg, review of tests), Obtaining and/or reviewing separately obtained history, Documenting clinical information in the electronic or other health record, Independently interpreting results (not separately reported) and communicating results to the patient/family/caregiver, or Care coordination (not separately reported).     Lolsi Dee, P-BC [facility]  Division of Female Pelvic Medicine and Reconstructive Surgery  Department of Obstetrics & Gynecology

## 2024-04-18 ENCOUNTER — OFFICE VISIT (OUTPATIENT)
Dept: UROGYNECOLOGY | Facility: CLINIC | Age: 34
End: 2024-04-18
Payer: COMMERCIAL

## 2024-04-18 DIAGNOSIS — N92.3 INTERMENSTRUAL BLEEDING: Primary | ICD-10-CM

## 2024-04-18 PROCEDURE — 1159F MED LIST DOCD IN RCRD: CPT | Mod: CPTII,95,, | Performed by: NURSE PRACTITIONER

## 2024-04-18 PROCEDURE — 1160F RVW MEDS BY RX/DR IN RCRD: CPT | Mod: CPTII,95,, | Performed by: NURSE PRACTITIONER

## 2024-04-18 PROCEDURE — 99213 OFFICE O/P EST LOW 20 MIN: CPT | Mod: 95,,, | Performed by: NURSE PRACTITIONER

## 2024-04-18 NOTE — PATIENT INSTRUCTIONS
Intramenstrual spotting  --resolved off of micronor  --please keep track of menses  --call if cycles are less than 21 days more than once or last longer than 10 days    2. RTC for annual exam in July

## 2024-07-10 ENCOUNTER — OFFICE VISIT (OUTPATIENT)
Dept: UROGYNECOLOGY | Facility: CLINIC | Age: 34
End: 2024-07-10
Payer: COMMERCIAL

## 2024-07-10 VITALS
SYSTOLIC BLOOD PRESSURE: 108 MMHG | BODY MASS INDEX: 25.35 KG/M2 | HEIGHT: 63 IN | DIASTOLIC BLOOD PRESSURE: 69 MMHG | WEIGHT: 143.06 LBS | HEART RATE: 67 BPM

## 2024-07-10 DIAGNOSIS — B37.2 YEAST DERMATITIS: ICD-10-CM

## 2024-07-10 DIAGNOSIS — N95.2 VAGINAL ATROPHY: ICD-10-CM

## 2024-07-10 DIAGNOSIS — Z01.419 WELL WOMAN EXAM: Primary | ICD-10-CM

## 2024-07-10 PROCEDURE — 1160F RVW MEDS BY RX/DR IN RCRD: CPT | Mod: CPTII,S$GLB,, | Performed by: NURSE PRACTITIONER

## 2024-07-10 PROCEDURE — 3078F DIAST BP <80 MM HG: CPT | Mod: CPTII,S$GLB,, | Performed by: NURSE PRACTITIONER

## 2024-07-10 PROCEDURE — 99395 PREV VISIT EST AGE 18-39: CPT | Mod: S$GLB,,, | Performed by: NURSE PRACTITIONER

## 2024-07-10 PROCEDURE — 3008F BODY MASS INDEX DOCD: CPT | Mod: CPTII,S$GLB,, | Performed by: NURSE PRACTITIONER

## 2024-07-10 PROCEDURE — 99999 PR PBB SHADOW E&M-EST. PATIENT-LVL IV: CPT | Mod: PBBFAC,,, | Performed by: NURSE PRACTITIONER

## 2024-07-10 PROCEDURE — 3074F SYST BP LT 130 MM HG: CPT | Mod: CPTII,S$GLB,, | Performed by: NURSE PRACTITIONER

## 2024-07-10 PROCEDURE — 1159F MED LIST DOCD IN RCRD: CPT | Mod: CPTII,S$GLB,, | Performed by: NURSE PRACTITIONER

## 2024-07-10 RX ORDER — ESTRADIOL 0.1 MG/G
1 CREAM VAGINAL DAILY
Qty: 42.5 G | Refills: 3 | Status: SHIPPED | OUTPATIENT
Start: 2024-07-10

## 2024-07-10 RX ORDER — FLUCONAZOLE 150 MG/1
150 TABLET ORAL
Qty: 3 TABLET | Refills: 0 | Status: SHIPPED | OUTPATIENT
Start: 2024-07-10 | End: 2024-07-15

## 2024-07-10 NOTE — PROGRESS NOTES
07/10/2024    SUBJECTIVE:   33 y.o. female for annual exam.      Past Medical History:   Diagnosis Date    Allergy     Anxiety     Depression        Past Surgical History:   Procedure Laterality Date    STRABISMUS SURGERY      x 2    WISDOM TOOTH EXTRACTION  2005       Family History   Problem Relation Name Age of Onset    Allergies Mother      No Known Problems Father      Asthma Brother      Diabetes Paternal Uncle lymphoma     Cancer Paternal Uncle lymphoma     Scleroderma Maternal Grandmother      Diabetes Maternal Grandfather      Hashimoto's thyroiditis Other COUSIN        Social History     Socioeconomic History    Marital status: Single    Number of children: 0   Occupational History     Comment: [employer]   Tobacco Use    Smoking status: Never     Passive exposure: Past    Smokeless tobacco: Never   Substance and Sexual Activity    Alcohol use: Yes     Alcohol/week: 1.0 standard drink of alcohol     Types: 1 Glasses of wine per week     Comment: social    Drug use: No    Sexual activity: Yes     Partners: Male     Birth control/protection: OCP   Social History Narrative    The patient does exercise regularly (rides bike to work ~8mi/day).          She is not satisfied with weight.    Rates diet as fair to poor.    She does drink at least 1/2 gallon water daily.    She drinks 0-1 coffee/tea/caffeine-containing soft drinks daily.    Total sleep time at night is 8-9 hours.    She works 40 hours per week.    She does wear seat belts.    Hobbies include sewing, embroidery, reading     Social Determinants of Health     Financial Resource Strain: Low Risk  (7/8/2024)    Overall Financial Resource Strain (CARDIA)     Difficulty of Paying Living Expenses: Not hard at all   Food Insecurity: No Food Insecurity (7/8/2024)    Hunger Vital Sign     Worried About Running Out of Food in the Last Year: Never true     Ran Out of Food in the Last Year: Never true   Transportation Needs: Unmet Transportation Needs  (10/23/2022)    PRAPARE - Transportation     Lack of Transportation (Medical): Yes     Lack of Transportation (Non-Medical): No   Physical Activity: Sufficiently Active (2024)    Exercise Vital Sign     Days of Exercise per Week: 5 days     Minutes of Exercise per Session: 40 min   Stress: No Stress Concern Present (2024)    [employer] - Occupational Stress Questionnaire     Feeling of Stress : Only a little   Housing Stability: Low Risk  (10/23/2022)    Housing Stability Vital Sign     Unable to Pay for Housing in the Last Year: No     Number of Places Lived in the Last Year: 1     Unstable Housing in the Last Year: No       Current Outpatient Medications   Medication Sig Dispense Refill    mometasone (NASONEX) 50 mcg/actuation nasal spray 2 sprays by Nasal route 2 (two) times daily. 2 each 5    sertraline (ZOLOFT) 25 MG tablet Take 1 tablet (25 mg total) by mouth once daily. 90 tablet 3    estradioL (ESTRACE) 0.01 % (0.1 mg/gram) vaginal cream Place 1 g vaginally once daily. 42.5 g 3    fluconazole (DIFLUCAN) 150 MG Tab Take 1 tablet (150 mg total) by mouth every 48 hours. for 3 doses 3 tablet 0     No current facility-administered medications for this visit.       Review of patient's allergies indicates:   Allergen Reactions    Latex, natural rubber Rash          Regular periods- last 3-4 days.  Around every 28 days  Denies intramenstrual bleeding    2022; 2023 pelvic ultrasound normal  No longer using ocp's      Well Woman:  Pap:2022 normal, negative HPV        Family History  Family History   Problem Relation Name Age of Onset    Allergies Mother      No Known Problems Father      Asthma Brother      Diabetes Paternal Uncle lymphoma     Cancer Paternal Uncle lymphoma     Scleroderma Maternal Grandmother      Diabetes Maternal Grandfather      Hashimoto's thyroiditis Other COUSIN       Colon CA: No  Breast CA: No  GYN CA: No   CA: No      ROS:  Feeling well.  "  No dyspnea or chest pain on exertion.    No abdominal pain, change in bowel habits, black or bloody stools.    No urinary tract symptoms.   GYN ROS: intrmentrual spotting around ovulation, no abnormal bleeding, pelvic pain or discharge, no breast pain or new or enlarging lumps on self exam.   No neurological complaints.    OBJECTIVE:   The patient appears well, alert, oriented x 3, in no distress.  /69 (BP Location: Left arm, Patient Position: Sitting, BP Method: Medium (Automatic))   Pulse 67   Ht 5' 3" (1.6 m)   Wt 64.9 kg (143 lb 1.3 oz)   LMP 06/15/2024 (Exact Date)   BMI 25.35 kg/m²   ENT normal.  Neck supple. No adenopathy, thyroid is enlarged. RY.   Normal respiratory effort  Heart with regular rate and rhythm.   Abdomen soft without tenderness, guarding, mass or organomegaly.   Extremities show no edema, normal peripheral pulses.   Neurological is normal, no focal findings.    BREAST EXAM: breasts appear normal, no suspicious masses, no skin or nipple changes or axillary nodes.  L nipple inverted    PELVIC EXAM:   VULVA: normal appearing vulva with no masses, tenderness or lesions,   VAGINA: normal appearing vagina with normal color and thick clumpy discharge, no lesions, mild atrophy  CERVIX: normal appearing cervix without discharge or lesions,   UTERUS: uterus is normal size, shape, consistency and nontender,   ADNEXA: no masses,   RECTAL: deferred    ASSESSMENT:   1. Well woman exam        2. Yeast dermatitis  fluconazole (DIFLUCAN) 150 MG Tab      3. Vaginal atrophy                  PLAN:     1. Well woman  --up to date    2. Vaginal itching  --diflucan 150 mg every other day x 3 doses  --start vaginal estrogen cream----use dime size amount in vagina-- insert to your second knuckle and rub around just inside vaginal opening nightly x 2 weeks then twice weekly    3.  RTC 1 year for annual      30 minutes were spent in face to face time with this patient  90 % of this time was spent in " counseling and/or coordination of care    Lolis PINA [facility]  Division of Female Pelvic Medicine and Reconstructive Surgery  Department of Obstetrics & Gynecology

## 2024-07-10 NOTE — PATIENT INSTRUCTIONS
1. Well woman  --up to date    2. Vaginal itching  --diflucan 150 mg every other day x 3 doses  --start vaginal estrogen cream----use dime size amount in vagina-- insert to your second knuckle and rub around just inside vaginal opening nightly x 2 weeks then twice weekly    3.  RTC 1 year for annual

## 2024-07-11 ENCOUNTER — PATIENT MESSAGE (OUTPATIENT)
Dept: INTERNAL MEDICINE | Facility: CLINIC | Age: 34
End: 2024-07-11
Payer: COMMERCIAL

## 2024-07-12 ENCOUNTER — LAB VISIT (OUTPATIENT)
Dept: LAB | Facility: HOSPITAL | Age: 34
End: 2024-07-12
Payer: COMMERCIAL

## 2024-07-12 ENCOUNTER — OFFICE VISIT (OUTPATIENT)
Dept: INTERNAL MEDICINE | Facility: CLINIC | Age: 34
End: 2024-07-12
Payer: COMMERCIAL

## 2024-07-12 VITALS
BODY MASS INDEX: 25.39 KG/M2 | HEIGHT: 63 IN | HEART RATE: 81 BPM | DIASTOLIC BLOOD PRESSURE: 70 MMHG | WEIGHT: 143.31 LBS | SYSTOLIC BLOOD PRESSURE: 100 MMHG | OXYGEN SATURATION: 98 %

## 2024-07-12 DIAGNOSIS — Z00.00 ANNUAL PHYSICAL EXAM: Primary | ICD-10-CM

## 2024-07-12 DIAGNOSIS — Z91.89 FRAMINGHAM CARDIAC RISK <10% IN NEXT 10 YEARS: ICD-10-CM

## 2024-07-12 DIAGNOSIS — Z00.00 ANNUAL PHYSICAL EXAM: ICD-10-CM

## 2024-07-12 DIAGNOSIS — J30.9 ALLERGIC RHINITIS, UNSPECIFIED SEASONALITY, UNSPECIFIED TRIGGER: ICD-10-CM

## 2024-07-12 DIAGNOSIS — F41.8 ANXIETY WITH DEPRESSION: ICD-10-CM

## 2024-07-12 DIAGNOSIS — F84.5 ASPERGER'S SYNDROME: ICD-10-CM

## 2024-07-12 LAB
ABO + RH BLD: NORMAL
BLD GP AB SCN CELLS X3 SERPL QL: NORMAL
SPECIMEN OUTDATE: NORMAL

## 2024-07-12 PROCEDURE — 86850 RBC ANTIBODY SCREEN: CPT | Performed by: NURSE PRACTITIONER

## 2024-07-12 PROCEDURE — 36415 COLL VENOUS BLD VENIPUNCTURE: CPT | Performed by: NURSE PRACTITIONER

## 2024-07-12 PROCEDURE — 99999 PR PBB SHADOW E&M-EST. PATIENT-LVL IV: CPT | Mod: PBBFAC,,, | Performed by: NURSE PRACTITIONER

## 2024-07-12 RX ORDER — MUPIROCIN 20 MG/G
OINTMENT TOPICAL 2 TIMES DAILY
COMMUNITY
Start: 2024-05-24

## 2024-07-12 NOTE — PROGRESS NOTES
Internal Medicine Annual Exam       CHIEF COMPLAINT     The patient, Safia Morales, who is a 33 y.o. female with anxiety and depression presents for an annual exam.    HPI   Here for annual - last seen 7/12/2023 by Dr [facility]     Yeast infection- dx by Gyn earlier this week - sent in diflucan x 3 doses  LMP- 7/10/2024    Anxiety/depression- taking zoloft 25mg - mood controlled.       HM-  PAP-9/1/2022  Tdap-UTD     Rides bike to work daily   Takes her some time to fall asleep 10pm -7am     Past Medical History:  Past Medical History:   Diagnosis Date    Allergy     Anxiety     Depression        Past Surgical History:   Procedure Laterality Date    STRABISMUS SURGERY      x 2    WISDOM TOOTH EXTRACTION  2005        Family History   Problem Relation Name Age of Onset    Allergies Mother      No Known Problems Father      Asthma Brother      Diabetes Paternal Uncle lymphoma     Cancer Paternal Uncle lymphoma     Scleroderma Maternal Grandmother      Diabetes Maternal Grandfather      Hashimoto's thyroiditis Other COUSIN         Social History     Socioeconomic History    Marital status: Single    Number of children: 0   Occupational History     Comment: [employer]   Tobacco Use    Smoking status: Never     Passive exposure: Past    Smokeless tobacco: Never   Substance and Sexual Activity    Alcohol use: Yes     Alcohol/week: 1.0 standard drink of alcohol     Types: 1 Glasses of wine per week     Comment: social    Drug use: No    Sexual activity: Yes     Partners: Male     Birth control/protection: OCP   Social History Narrative    The patient does exercise regularly (rides bike to work ~8mi/day).          She is not satisfied with weight.    Rates diet as fair to poor.    She does drink at least 1/2 gallon water daily.    She drinks 0-1 coffee/tea/caffeine-containing soft drinks daily.    Total sleep time at night is 8-9 hours.    She works 40 hours per week.    She does wear seat belts.     Hobbies include sewing, embroidery, reading     Social Determinants of Health     Financial Resource Strain: Low Risk  (7/8/2024)    Overall Financial Resource Strain (CARDIA)     Difficulty of Paying Living Expenses: Not hard at all   Food Insecurity: No Food Insecurity (7/8/2024)    Hunger Vital Sign     Worried About Running Out of Food in the Last Year: Never true     Ran Out of Food in the Last Year: Never true   Transportation Needs: Unmet Transportation Needs (10/23/2022)    PRAPARE - Transportation     Lack of Transportation (Medical): Yes     Lack of Transportation (Non-Medical): No   Physical Activity: Sufficiently Active (7/8/2024)    Exercise Vital Sign     Days of Exercise per Week: 5 days     Minutes of Exercise per Session: 40 min   Stress: No Stress Concern Present (7/8/2024)    [employer] - Occupational Stress Questionnaire     Feeling of Stress : Only a little   Housing Stability: Low Risk  (10/23/2022)    Housing Stability Vital Sign     Unable to Pay for Housing in the Last Year: No     Number of Places Lived in the Last Year: 1     Unstable Housing in the Last Year: No        Social History     Tobacco Use   Smoking Status Never    Passive exposure: Past   Smokeless Tobacco Never        Allergies as of 07/12/2024 - Reviewed 07/12/2024   Allergen Reaction Noted    Latex, natural rubber Rash           Home Medications:  Prior to Admission medications    Medication Sig Start Date End Date Taking? Authorizing Provider   estradioL (ESTRACE) 0.01 % (0.1 mg/gram) vaginal cream Place 1 g vaginally once daily. 7/10/24  Yes Lolis Dee NP   fluconazole (DIFLUCAN) 150 MG Tab Take 1 tablet (150 mg total) by mouth every 48 hours. for 3 doses 7/10/24 7/15/24 Yes Lolis Dee NP   mometasone (NASONEX) 50 mcg/actuation nasal spray 2 sprays by Nasal route 2 (two) times daily. 3/30/23  Yes Randee Padgett MD   mupirocin (BACTROBAN) 2 % ointment Apply topically 2  "(two) times daily. 5/24/24  Yes Provider, Historical   sertraline (ZOLOFT) 25 MG tablet Take 1 tablet (25 mg total) by mouth once daily. 10/2/23  Yes Malou Carter MD       Review of Systems:  Review of Systems   Constitutional:  Negative for chills, diaphoresis, fatigue and fever.   HENT:  Positive for congestion (allergic rhinitis). Negative for postnasal drip and sore throat.    Respiratory:  Negative for cough, shortness of breath and wheezing.    Cardiovascular:  Negative for chest pain and leg swelling.   Gastrointestinal:  Negative for abdominal pain, constipation, diarrhea, nausea and vomiting.   Endocrine: Positive for cold intolerance.   Genitourinary:  Negative for dysuria, urgency and vaginal pain.        Vaginal itching has improved    Musculoskeletal:  Positive for arthralgias (knee pain). Negative for myalgias.   Neurological:  Positive for headaches (occular migraines - sees aura). Negative for dizziness and light-headedness.   Psychiatric/Behavioral:  Negative for dysphoric mood and sleep disturbance. The patient is not nervous/anxious.        Health Maintainence:   Immunizations:  Health Maintenance         Date Due Completion Date    COVID-19 Vaccine (4 - 2023-24 season) 09/01/2023 3/18/2022    Influenza Vaccine (1) 09/01/2024 ---    Hemoglobin A1c (Diabetic Prevention Screening) 07/12/2026 7/12/2023    Cervical Cancer Screening 09/01/2027 9/1/2022    TETANUS VACCINE 04/27/2032 4/27/2022    Override on 9/1/2015: Done (date approximate per patient)             PHYSICAL EXAM     /70 (BP Location: Left arm, Patient Position: Sitting, BP Method: Medium (Manual))   Pulse 81   Ht 5' 3" (1.6 m)   Wt 65 kg (143 lb 4.8 oz)   LMP 07/10/2024 (Exact Date)   SpO2 98%   BMI 25.38 kg/m²  Body mass index is 25.38 kg/m².    Physical Exam  Vitals reviewed.   Constitutional:       Appearance: She is well-developed.   HENT:      Head: Normocephalic.      Right Ear: External ear normal.      Left Ear: " External ear normal.      Nose: Nose normal.      Mouth/Throat:      Pharynx: No oropharyngeal exudate.   Eyes:      Pupils: Pupils are equal, round, and reactive to light.   Neck:      Thyroid: No thyromegaly.      Vascular: No JVD.      Trachea: No tracheal deviation.   Cardiovascular:      Rate and Rhythm: Normal rate and regular rhythm.      Heart sounds: Normal heart sounds. No murmur heard.     No friction rub. No gallop.   Pulmonary:      Effort: Pulmonary effort is normal. No respiratory distress.      Breath sounds: Normal breath sounds. No wheezing or rales.   Abdominal:      General: Bowel sounds are normal. There is no distension.      Palpations: Abdomen is soft.      Tenderness: There is no abdominal tenderness.   Musculoskeletal:         General: No tenderness. Normal range of motion.      Cervical back: Neck supple.   Lymphadenopathy:      Cervical: No cervical adenopathy.   Skin:     General: Skin is warm and dry.      Findings: No rash.   Neurological:      Mental Status: She is alert and oriented to person, place, and time.   Psychiatric:         Behavior: Behavior normal.         LABS     Lab Results   Component Value Date    HGBA1C 4.8 07/12/2023     CMP  Sodium   Date Value Ref Range Status   07/12/2023 136 136 - 145 mmol/L Final     Potassium   Date Value Ref Range Status   07/12/2023 3.7 3.5 - 5.1 mmol/L Final     Chloride   Date Value Ref Range Status   07/12/2023 106 95 - 110 mmol/L Final     CO2   Date Value Ref Range Status   07/12/2023 24 23 - 29 mmol/L Final     Glucose   Date Value Ref Range Status   07/12/2023 94 70 - 110 mg/dL Final     BUN   Date Value Ref Range Status   07/12/2023 10 6 - 20 mg/dL Final     Creatinine   Date Value Ref Range Status   07/12/2023 0.8 0.5 - 1.4 mg/dL Final     Calcium   Date Value Ref Range Status   07/12/2023 9.4 8.7 - 10.5 mg/dL Final     Total Protein   Date Value Ref Range Status   07/12/2023 7.5 6.0 - 8.4 g/dL Final     Albumin   Date Value Ref  Range Status   07/12/2023 4.1 3.5 - 5.2 g/dL Final     Total Bilirubin   Date Value Ref Range Status   07/12/2023 0.6 0.1 - 1.0 mg/dL Final     Comment:     For infants and newborns, interpretation of results should be based  on gestational age, weight and in agreement with clinical  observations.    Premature Infant recommended reference ranges:  Up to 24 hours.............<8.0 mg/dL  Up to 48 hours............<12.0 mg/dL  3-5 days..................<15.0 mg/dL  6-29 days.................<15.0 mg/dL       Alkaline Phosphatase   Date Value Ref Range Status   07/12/2023 69 55 - 135 U/L Final     AST   Date Value Ref Range Status   07/12/2023 22 10 - 40 U/L Final     ALT   Date Value Ref Range Status   07/12/2023 24 10 - 44 U/L Final     Anion Gap   Date Value Ref Range Status   07/12/2023 6 (L) 8 - 16 mmol/L Final     eGFR if    Date Value Ref Range Status   02/23/2021 >60.0 >60 mL/min/1.73 m^2 Final     eGFR if non    Date Value Ref Range Status   02/23/2021 >60.0 >60 mL/min/1.73 m^2 Final     Comment:     Calculation used to obtain the estimated glomerular filtration  rate (eGFR) is the CKD-EPI equation.        Lab Results   Component Value Date    WBC 8.32 07/12/2023    HGB 14.7 07/12/2023    HCT 43.9 07/12/2023    MCV 96 07/12/2023     07/12/2023     Lab Results   Component Value Date    CHOL 236 (H) 07/12/2023    CHOL 200 (H) 02/23/2021    CHOL 208 (H) 11/04/2019     Lab Results   Component Value Date    HDL 47 07/12/2023    HDL 46 02/23/2021    HDL 43 11/04/2019     Lab Results   Component Value Date    LDLCALC 156.8 07/12/2023    LDLCALC 132.8 02/23/2021    LDLCALC 136.6 11/04/2019     Lab Results   Component Value Date    TRIG 161 (H) 07/12/2023    TRIG 106 02/23/2021    TRIG 142 11/04/2019     Lab Results   Component Value Date    CHOLHDL 19.9 (L) 07/12/2023    CHOLHDL 23.0 02/23/2021    CHOLHDL 20.7 11/04/2019     Lab Results   Component Value Date    TSH 1.513  10/27/2022       ASSESSMENT/PLAN     Safia Morales is a 33 y.o. female    Annual physical exam- All age and gender related screenings discussed   -     CBC Auto Differential; Future; Expected date: 07/12/2024  -     Comprehensive Metabolic Panel; Future; Expected date: 07/12/2024  -     Hemoglobin A1C; Future; Expected date: 07/12/2024  -     Lipid Panel; Future; Expected date: 07/12/2024  -     TSH; Future; Expected date: 07/12/2024  -     Type & Screen; Future; Expected date: 07/12/2024    Asperger's syndrome- stable. Will cont to monitor and cont with therapy     Anxiety with depression- stable. Will cont current meds and monitor     Allergic rhinitis, unspecified seasonality, unspecified trigger    [place] cardiac risk <10% in next 10 years    Follow up with PCP     Dafne PRIEST, APRN, FNP-c   Department of Internal Medicine - [facility]  8:36 AM

## 2024-10-16 DIAGNOSIS — F41.8 ANXIETY WITH DEPRESSION: ICD-10-CM

## 2024-10-17 RX ORDER — SERTRALINE HYDROCHLORIDE 25 MG/1
25 TABLET, FILM COATED ORAL DAILY
Qty: 90 TABLET | Refills: 1 | Status: SHIPPED | OUTPATIENT
Start: 2024-10-17

## 2024-10-17 NOTE — TELEPHONE ENCOUNTER
Dr. Westfall last refilled this rx   Pt last saw Dr. Westfall 7/12/23    Pt saw ALEX Topete 7/12/24

## 2025-01-16 DIAGNOSIS — F41.8 ANXIETY WITH DEPRESSION: ICD-10-CM

## 2025-01-16 RX ORDER — SERTRALINE HYDROCHLORIDE 25 MG/1
TABLET, FILM COATED ORAL
Qty: 90 TABLET | Refills: 1 | Status: SHIPPED | OUTPATIENT
Start: 2025-01-16

## 2025-01-16 NOTE — TELEPHONE ENCOUNTER
Refill Routing Note   Medication(s) are not appropriate for processing by [facility] for the following reason(s):        Non-participating provider  Responsible provider unclear    [facility] action(s):  Route               Appointments  past 12m or future 3m with PCP    Date Provider   Last Visit   7/12/2024 Dafne Topete NP   Next Visit   Visit date not found Dafne Topete NP   ED visits in past 90 days: 0        Note composed:11:34 AM 01/16/2025

## 2025-04-28 ENCOUNTER — TELEPHONE (OUTPATIENT)
Dept: UROGYNECOLOGY | Facility: CLINIC | Age: 35
End: 2025-04-28
Payer: COMMERCIAL

## 2025-05-07 ENCOUNTER — OFFICE VISIT (OUTPATIENT)
Dept: INTERNAL MEDICINE | Facility: CLINIC | Age: 35
End: 2025-05-07
Payer: COMMERCIAL

## 2025-05-07 ENCOUNTER — HOSPITAL ENCOUNTER (OUTPATIENT)
Dept: RADIOLOGY | Facility: HOSPITAL | Age: 35
Discharge: HOME | End: 2025-05-07
Attending: NURSE PRACTITIONER
Payer: COMMERCIAL

## 2025-05-07 VITALS
HEART RATE: 74 BPM | SYSTOLIC BLOOD PRESSURE: 102 MMHG | BODY MASS INDEX: 23.75 KG/M2 | WEIGHT: 134.06 LBS | OXYGEN SATURATION: 99 % | DIASTOLIC BLOOD PRESSURE: 74 MMHG | HEIGHT: 63 IN

## 2025-05-07 DIAGNOSIS — M54.12 CERVICAL RADICULOPATHY: Primary | ICD-10-CM

## 2025-05-07 DIAGNOSIS — M54.12 CERVICAL MYELOPATHY WITH CERVICAL RADICULOPATHY: ICD-10-CM

## 2025-05-07 DIAGNOSIS — G95.9 CERVICAL MYELOPATHY WITH CERVICAL RADICULOPATHY: ICD-10-CM

## 2025-05-07 DIAGNOSIS — M54.12 CERVICAL RADICULOPATHY: ICD-10-CM

## 2025-05-07 DIAGNOSIS — M62.81 MUSCLE WEAKNESS OF LEFT UPPER EXTREMITY: ICD-10-CM

## 2025-05-07 DIAGNOSIS — R63.4 WEIGHT LOSS, UNINTENTIONAL: ICD-10-CM

## 2025-05-07 DIAGNOSIS — R20.0 LOSS OF SENSATION: ICD-10-CM

## 2025-05-07 PROCEDURE — 1159F MED LIST DOCD IN RCRD: CPT | Mod: CPTII,S$GLB,, | Performed by: NURSE PRACTITIONER

## 2025-05-07 PROCEDURE — 99999 PR PBB SHADOW E&M-EST. PATIENT-LVL IV: CPT | Mod: PBBFAC,,, | Performed by: NURSE PRACTITIONER

## 2025-05-07 PROCEDURE — 72050 X-RAY EXAM NECK SPINE 4/5VWS: CPT | Mod: 26,,, | Performed by: RADIOLOGY

## 2025-05-07 PROCEDURE — 72050 X-RAY EXAM NECK SPINE 4/5VWS: CPT | Mod: TC

## 2025-05-07 PROCEDURE — 3078F DIAST BP <80 MM HG: CPT | Mod: CPTII,S$GLB,, | Performed by: NURSE PRACTITIONER

## 2025-05-07 PROCEDURE — 3074F SYST BP LT 130 MM HG: CPT | Mod: CPTII,S$GLB,, | Performed by: NURSE PRACTITIONER

## 2025-05-07 PROCEDURE — 3008F BODY MASS INDEX DOCD: CPT | Mod: CPTII,S$GLB,, | Performed by: NURSE PRACTITIONER

## 2025-05-07 PROCEDURE — 3044F HG A1C LEVEL LT 7.0%: CPT | Mod: CPTII,S$GLB,, | Performed by: NURSE PRACTITIONER

## 2025-05-07 PROCEDURE — 99214 OFFICE O/P EST MOD 30 MIN: CPT | Mod: S$GLB,,, | Performed by: NURSE PRACTITIONER

## 2025-05-07 RX ORDER — METHYLPREDNISOLONE 4 MG/1
TABLET ORAL
Qty: 21 EACH | Refills: 0 | Status: SHIPPED | OUTPATIENT
Start: 2025-05-07 | End: 2025-05-28

## 2025-05-07 NOTE — PROGRESS NOTES
INTERNAL MEDICINE PROGRESS/URGENT CARE NOTE    CHIEF COMPLAINT     Chief Complaint   Patient presents with    Muscle Pain     Pulled muscle  Lost control of hand   Pt states she's holding something and out of nowhere she hears something break or a sound of crashing and what she's holding on hand is no longer on her hand its on th floor broken       HPI     Safia Morales is a 34 y.o. female with Anxiety, Depression, AR who presents for an urgent visit today.    Reports 1 month history of left shoulder and upper back and upper chest pain with radiation down the left arm. 2 episodes of dropping items from the left hand   4/1- dropped plates and had pain to the left upper chest with bruising   4/11 dropped dinner plate   Mild tingling to the left finger   May have started after pulling weeds and using a saw to cut thick branches   Lifts and carries heavy objects at work     Treatment advil - with improvement     Also reports left leg tingling while standing for prolonged period and resolved     Loss of urge to defecate - no fecal incontinence     Weight loss- 9 lbs since last July       Past Medical History:  Past Medical History:   Diagnosis Date    Allergy     Anxiety     Depression        Home Medications:  Prior to Admission medications    Medication Sig Start Date End Date Taking? Authorizing Provider   estradioL (ESTRACE) 0.01 % (0.1 mg/gram) vaginal cream Place 1 g vaginally once daily. 7/10/24  Yes Lolis Dee NP   mometasone (NASONEX) 50 mcg/actuation nasal spray 2 sprays by Nasal route 2 (two) times daily. 3/30/23  Yes Randee Padgett MD   sertraline (ZOLOFT) 25 MG tablet TAKE 1 TABLET(25 MG) BY MOUTH DAILY 1/16/25  Yes Dafne Topete, NP   mupirocin (BACTROBAN) 2 % ointment Apply topically 2 (two) times daily.  Patient not taking: Reported on 5/7/2025 5/24/24   Provider, Historical       Review of Systems:  Review of Systems   Constitutional:  Negative for chills and fever.   Eyes:   "Negative for visual disturbance.   Musculoskeletal:  Positive for back pain and neck pain.   Neurological:  Positive for numbness. Negative for dizziness, speech difficulty, weakness, light-headedness and headaches.       Health Maintainence:   Immunizations:  Health Maintenance         Date Due Completion Date    COVID-19 Vaccine (4 - 2024-25 season) 09/01/2024 3/18/2022    Influenza Vaccine (Season Ended) 09/01/2025 ---    Cervical Cancer Screening 09/01/2027 9/1/2022    TETANUS VACCINE 04/27/2032 4/27/2022    Override on 9/1/2015: Done (date approximate per patient)    RSV Vaccine (Age 60+ and Pregnant patients) (1 - 1-dose 75+ series) 07/16/2063 ---             PHYSICAL EXAM     /74 (BP Location: Right arm, Patient Position: Sitting)   Pulse 74   Ht 5' 3" (1.6 m)   Wt 60.8 kg (134 lb 0.6 oz)   LMP 04/13/2025 (Exact Date)   SpO2 99%   BMI 23.74 kg/m²     Physical Exam  Vitals reviewed.   Constitutional:       Appearance: She is well-developed.   HENT:      Head: Normocephalic.      Right Ear: External ear normal.      Left Ear: External ear normal.      Nose: Nose normal.      Mouth/Throat:      Pharynx: No oropharyngeal exudate.   Eyes:      Pupils: Pupils are equal, round, and reactive to light.   Neck:      Thyroid: No thyromegaly.      Vascular: No JVD.      Trachea: No tracheal deviation.   Cardiovascular:      Rate and Rhythm: Normal rate and regular rhythm.      Heart sounds: Normal heart sounds. No murmur heard.     No friction rub. No gallop.   Pulmonary:      Effort: Pulmonary effort is normal. No respiratory distress.      Breath sounds: Normal breath sounds. No wheezing or rales.   Abdominal:      General: Bowel sounds are normal. There is no distension.      Palpations: Abdomen is soft.      Tenderness: There is no abdominal tenderness.   Musculoskeletal:         General: No tenderness. Normal range of motion.      Cervical back: Neck supple.   Lymphadenopathy:      Cervical: No cervical " adenopathy.   Skin:     General: Skin is warm and dry.      Findings: No rash.   Neurological:      Mental Status: She is alert and oriented to person, place, and time.      Cranial Nerves: Cranial nerves 2-12 are intact.      Sensory: Sensation is intact.      Motor: Motor function is intact. No weakness.      Deep Tendon Reflexes:      Reflex Scores:       Patellar reflexes are 2+ on the right side and 2+ on the left side.  Psychiatric:         Behavior: Behavior normal.         LABS     Lab Results   Component Value Date    HGBA1C 5.1 07/12/2024     CMP  Sodium   Date Value Ref Range Status   07/12/2024 136 136 - 145 mmol/L Final     Potassium   Date Value Ref Range Status   07/12/2024 4.2 3.5 - 5.1 mmol/L Final     Chloride   Date Value Ref Range Status   07/12/2024 105 95 - 110 mmol/L Final     CO2   Date Value Ref Range Status   07/12/2024 24 23 - 29 mmol/L Final     Glucose   Date Value Ref Range Status   07/12/2024 82 70 - 110 mg/dL Final     BUN   Date Value Ref Range Status   07/12/2024 9 6 - 20 mg/dL Final     Creatinine   Date Value Ref Range Status   07/12/2024 0.9 0.5 - 1.4 mg/dL Final     Calcium   Date Value Ref Range Status   07/12/2024 9.0 8.7 - 10.5 mg/dL Final     Total Protein   Date Value Ref Range Status   07/12/2024 7.0 6.0 - 8.4 g/dL Final     Albumin   Date Value Ref Range Status   07/12/2024 4.0 3.5 - 5.2 g/dL Final     Total Bilirubin   Date Value Ref Range Status   07/12/2024 0.8 0.1 - 1.0 mg/dL Final     Comment:     For infants and newborns, interpretation of results should be based  on gestational age, weight and in agreement with clinical  observations.    Premature Infant recommended reference ranges:  Up to 24 hours.............<8.0 mg/dL  Up to 48 hours............<12.0 mg/dL  3-5 days..................<15.0 mg/dL  6-29 days.................<15.0 mg/dL       Alkaline Phosphatase   Date Value Ref Range Status   07/12/2024 60 55 - 135 U/L Final     AST   Date Value Ref Range Status    07/12/2024 16 10 - 40 U/L Final     ALT   Date Value Ref Range Status   07/12/2024 15 10 - 44 U/L Final     Anion Gap   Date Value Ref Range Status   07/12/2024 7 (L) 8 - 16 mmol/L Final     eGFR if    Date Value Ref Range Status   02/23/2021 >60.0 >60 mL/min/1.73 m^2 Final     eGFR if non    Date Value Ref Range Status   02/23/2021 >60.0 >60 mL/min/1.73 m^2 Final     Comment:     Calculation used to obtain the estimated glomerular filtration  rate (eGFR) is the CKD-EPI equation.        Lab Results   Component Value Date    WBC 4.68 07/12/2024    HGB 14.3 07/12/2024    HCT 44.7 07/12/2024    MCV 97 07/12/2024     07/12/2024     Lab Results   Component Value Date    CHOL 218 (H) 07/12/2024    CHOL 236 (H) 07/12/2023    CHOL 200 (H) 02/23/2021     Lab Results   Component Value Date    HDL 49 07/12/2024    HDL 47 07/12/2023    HDL 46 02/23/2021     Lab Results   Component Value Date    LDLCALC 151.8 07/12/2024    LDLCALC 156.8 07/12/2023    LDLCALC 132.8 02/23/2021     Lab Results   Component Value Date    TRIG 86 07/12/2024    TRIG 161 (H) 07/12/2023    TRIG 106 02/23/2021     Lab Results   Component Value Date    CHOLHDL 22.5 07/12/2024    CHOLHDL 19.9 (L) 07/12/2023    CHOLHDL 23.0 02/23/2021     Lab Results   Component Value Date    TSH 0.683 07/12/2024       ASSESSMENT/PLAN     Safia Morales is a 34 y.o. female     Cervical radiculopathy- new problems. Will send for imaging and start medrol dose pack   -     X-Ray Cervical Spine Complete 5 view; Future; Expected date: 05/07/2025    Cervical myelopathy with cervical radiculopathy new problems. Will send for imaging and start medrol dose pack and refer to neurology   -     X-Ray Cervical Spine Complete 5 view; Future; Expected date: 05/07/2025  -     Ambulatory referral/consult to Neurology; Future; Expected date: 05/14/2025    Weight loss, unintentional- check labs.   -     CBC Auto Differential; Future; Expected date:  05/07/2025  -     Comprehensive Metabolic Panel; Future; Expected date: 05/07/2025  -     Hemoglobin A1C; Future; Expected date: 05/07/2025  -     TSH; Future; Expected date: 05/07/2025    Loss of sensation-  new problems. Will send for imaging and start medrol dose pack and refer to neurology   -     Ambulatory referral/consult to Neurology; Future; Expected date: 05/14/2025    Muscle weakness of left upper extremity new problems. Will send for imaging and start medrol dose pack and refer to neurology   -     Ambulatory referral/consult to Neurology; Future; Expected date: 05/14/2025    Other orders  -     methylPREDNISolone (MEDROL DOSEPACK) 4 mg tablet; use as directed  Dispense: 21 each; Refill: 0    Follow up with PCP    Patient education provided from [facility]. Patient was counseled on when and how to seek emergent care.       Dafne PRIEST, APRN, FNP-c   Department of Internal Medicine - [facility]  3:20 PM

## 2025-05-08 ENCOUNTER — RESULTS FOLLOW-UP (OUTPATIENT)
Dept: INTERNAL MEDICINE | Facility: CLINIC | Age: 35
End: 2025-05-08

## 2025-05-13 ENCOUNTER — PATIENT MESSAGE (OUTPATIENT)
Dept: INTERNAL MEDICINE | Facility: CLINIC | Age: 35
End: 2025-05-13
Payer: COMMERCIAL

## 2025-05-13 DIAGNOSIS — M25.512 ACUTE PAIN OF LEFT SHOULDER: ICD-10-CM

## 2025-05-13 DIAGNOSIS — M62.81 MUSCLE WEAKNESS OF LEFT UPPER EXTREMITY: Primary | ICD-10-CM

## 2025-05-13 NOTE — TELEPHONE ENCOUNTER
LOV with Terrence-Dafne Mott NP , 5/7/2025  Requesting lab results and c spine xray results from 5/7/25

## 2025-05-14 ENCOUNTER — HOSPITAL ENCOUNTER (OUTPATIENT)
Dept: RADIOLOGY | Facility: HOSPITAL | Age: 35
Discharge: HOME | End: 2025-05-14
Attending: NURSE PRACTITIONER
Payer: COMMERCIAL

## 2025-05-14 DIAGNOSIS — M25.512 ACUTE PAIN OF LEFT SHOULDER: ICD-10-CM

## 2025-05-14 DIAGNOSIS — M62.81 MUSCLE WEAKNESS OF LEFT UPPER EXTREMITY: ICD-10-CM

## 2025-05-14 PROCEDURE — 73030 X-RAY EXAM OF SHOULDER: CPT | Mod: TC,LT

## 2025-05-14 RX ORDER — NAPROXEN 500 MG/1
500 TABLET ORAL 2 TIMES DAILY WITH MEALS
Qty: 60 TABLET | Refills: 0 | Status: SHIPPED | OUTPATIENT
Start: 2025-05-14

## 2025-05-19 ENCOUNTER — RESULTS FOLLOW-UP (OUTPATIENT)
Dept: INTERNAL MEDICINE | Facility: CLINIC | Age: 35
End: 2025-05-19

## 2025-05-21 ENCOUNTER — HOSPITAL ENCOUNTER (OUTPATIENT)
Dept: RADIOLOGY | Facility: HOSPITAL | Age: 35
Discharge: HOME | End: 2025-05-21
Attending: PHYSICIAN ASSISTANT
Payer: COMMERCIAL

## 2025-05-21 ENCOUNTER — OFFICE VISIT (OUTPATIENT)
Dept: ORTHOPEDICS | Facility: CLINIC | Age: 35
End: 2025-05-21
Payer: COMMERCIAL

## 2025-05-21 DIAGNOSIS — M25.512 ACUTE PAIN OF LEFT SHOULDER: ICD-10-CM

## 2025-05-21 DIAGNOSIS — M62.81 MUSCLE WEAKNESS OF LEFT UPPER EXTREMITY: ICD-10-CM

## 2025-05-21 DIAGNOSIS — M54.50 LOW BACK PAIN, UNSPECIFIED BACK PAIN LATERALITY, UNSPECIFIED CHRONICITY, UNSPECIFIED WHETHER SCIATICA PRESENT: ICD-10-CM

## 2025-05-21 DIAGNOSIS — M25.78 OSTEOPHYTE OF SPINE: ICD-10-CM

## 2025-05-21 DIAGNOSIS — S46.812A STRAIN OF LEFT TRAPEZIUS MUSCLE, INITIAL ENCOUNTER: Primary | ICD-10-CM

## 2025-05-21 PROCEDURE — 72110 X-RAY EXAM L-2 SPINE 4/>VWS: CPT | Mod: TC

## 2025-05-21 PROCEDURE — 72110 X-RAY EXAM L-2 SPINE 4/>VWS: CPT | Mod: 26,,, | Performed by: RADIOLOGY

## 2025-05-21 PROCEDURE — 72070 X-RAY EXAM THORAC SPINE 2VWS: CPT | Mod: TC

## 2025-05-21 PROCEDURE — 99999 PR PBB SHADOW E&M-EST. PATIENT-LVL III: CPT | Mod: PBBFAC,,, | Performed by: PHYSICIAN ASSISTANT

## 2025-05-21 PROCEDURE — 72070 X-RAY EXAM THORAC SPINE 2VWS: CPT | Mod: 26,,, | Performed by: RADIOLOGY

## 2025-05-21 NOTE — PROGRESS NOTES
[facility]  Orthopedic Surgery  Clinic Note      Subjective:   History of Present Illness    CHIEF COMPLAINT:  Patient presents today for left-sided upper back pain.    HISTORY OF PRESENT ILLNESS:  Initial injury occurred on April 1st at 3:30pm while holding salad plates at work, resulting in immediate shooting pain and bruising in the left chest and sternal region. On May 6th, she developed left upper back pain while riding her bicycle and looking up at a bird, describing a pulling sensation in her back when turning her head left and upward. She started Medrol Dosepak on May 7th prescribed by PCP without relief. Pain worsened on May 9th requiring her to leave work early. Attempted heat therapy which exacerbated symptoms. Currently reports pain in the L trapezius muscle and left sided back that worsens with bending down to look at objects or picking things up. She denies numbness or tingling down the arm or leg, b/b incontinence, saddle anesthesia. On May 13th at 2:00 AM, she experienced a left leg preston horse that resolved. She denies exertional chest pain.     LABS:  CBC and CMP on May 7th were normal. Lipid panel showed elevated cholesterol, which has been a longstanding issue.    FAMILY HISTORY:  Maternal grandmother had scleroderma and passed away from the condition.          ROS:  General: -fever, -chills,  Musculoskeletal: -joint pain, +muscle pain, +back pain, +pain with movement  Neurological: -numbness, -tingling        Medications: I have reviewed medication list in the chart at the time of this encounter.     Review of patient's allergies indicates:   Allergen Reactions    Latex, natural rubber Rash      Past Medical History:   Diagnosis Date    Allergy     Anxiety     Depression      Past Surgical History:   Procedure Laterality Date    EYE SURGERY  I dont know    Lazy eye twice / last time was 4/5 grade    STRABISMUS SURGERY      x 2    WISDOM TOOTH EXTRACTION  2005       Objective:      Physical Exam  Chest:      Chest wall: No deformity, swelling or tenderness.   Abdominal:      Tenderness: There is no right CVA tenderness or left CVA tenderness.   Musculoskeletal:      Right shoulder: Normal.      Left shoulder: No swelling, effusion or bony tenderness. Normal range of motion. Normal strength.      Right upper arm: Normal.      Left upper arm: Normal.      Cervical back: Tenderness present. No bony tenderness. Normal range of motion.      Thoracic back: No bony tenderness. Normal range of motion.      Lumbar back: No tenderness or bony tenderness. Normal range of motion.        Back:       Right hip: Normal range of motion. Normal strength.      Left hip: Normal range of motion. Normal strength.      Right knee: Normal.      Left knee: Normal.          Right Hand Exam     Muscle Strength   The patient has normal right wrist strength.      Left Hand Exam     Muscle Strength   The patient has normal left wrist strength.      Right Elbow Exam     Muscle Strength   The patient has normal right elbow strength.      Left Elbow Exam     Muscle Strength   The patient has normal left elbow strength.      Right Shoulder Exam     Muscle Strength   The patient has normal right shoulder strength.      Left Shoulder Exam     Muscle Strength   The patient has normal left shoulder strength.           Left shoulder:  180/180° flexion.   180/180° abduction.   140/140° adduction.   -60/-60° extension.     Negative Painful arc test from °.   Negative Neer's impingement sign.   Negative Drop arm test.  Negative Desiree's supraspinatus test (empty can) test.   Negative Hornblower's (TM weakness) sign.  Negative Belly press test.   Negative Lift off test.   Negative internal rotation lag sign.   Negative Abduction/external rotation test.  Negative external rotation lag sign.   Negative Speed's test.   Negative O'tonya (active compression) test.   Negative Jerk test.   Negative Victorino Sign.  Negative Crank test.    Negative Anterior apprehension/instability test.   Negative Posterior apprehension/instability test.   Negative Sulcus (inferior instability) test/sign.    No signs of scapula dyskinesis, bilaterally.         Imaging:  I have independently interpreted and reviewed XR of lumbar and thoracic spine obtained today in clinic and past XR of L shoulder and C spine with patient.    Assessment:       1. Strain of left trapezius muscle, initial encounter    2. Muscle weakness of left upper extremity    3. Acute pain of left shoulder    4. Low back pain, unspecified back pain laterality, unspecified chronicity, unspecified whether sciatica present    5. Osteophyte of spine       Plan:       Orders Placed This Encounter    X-Ray Lumbar Spine Ap Lateral w/Flex Ext    X-Ray Thoracic Spine AP Lateral    Ambulatory Referral/Consult to Physical Therapy        Assessment & Plan    IMPRESSION:  - Assessed neck and shoulder XRs, which showed no significant abnormalities.  - XR Thoracic Spine showed osteophytes and mild disc space loss, indicative of early arthritis. XR lumbar normal.  - Diagnosed L trapezius muscle strain and tension based on exam and reported symptoms.  - Considered costochondritis as cause of previous chest pain.  - Ruled out serious shoulder pathology based on intact rotator cuff muscles and labrum.    PATIENT EDUCATION:  - Explained XR findings, including early signs of arthritis in middle back.  - Discussed and educated on proper lifting techniques using leg muscles instead of back, ergonomics, and proper posture while sitting and working.  - Explained costochondritis as inflammation where ribs meet sternum.    ACTION ITEMS/LIFESTYLE:  - Patient to perform stretching exercises for 5 minutes in the morning and evening. HEP print outs provided today in clinic.  - Patient to continue riding bicycle and incorporate core strengthening exercises.  - Recommend maintaining a healthy diet with whole foods, vegetables,  fruits, and lean meats.  - Patient to use ice therapy for pain management.    MEDICATIONS:  - Continued naproxen for pain management.    REFERRALS:  - Referred to physical therapy, with consideration for dry needling treatment.    FOLLOW UP:  - Follow up in 4-6 weeks or sooner if needed.         Future Appointments   Date Time Provider Department Center   6/12/2025  8:00 AM Lolis Samano MD [facility] NEUMUS [address]   6/25/2025  8:30 AM Reny Candelaria PA-C [facility] ORTHO [address]   7/30/2025 11:30 AM Lolis Dee NP [facility] UROGYMONICA Candel[facility] PA-C  Orthopedic Surgery  [facility]

## 2025-06-02 ENCOUNTER — CLINICAL SUPPORT (OUTPATIENT)
Dept: REHABILITATION | Facility: HOSPITAL | Age: 35
End: 2025-06-02
Payer: COMMERCIAL

## 2025-06-02 DIAGNOSIS — M25.78 OSTEOPHYTE OF SPINE: ICD-10-CM

## 2025-06-02 DIAGNOSIS — S46.812A STRAIN OF LEFT TRAPEZIUS MUSCLE, INITIAL ENCOUNTER: ICD-10-CM

## 2025-06-02 DIAGNOSIS — M54.50 LOW BACK PAIN, UNSPECIFIED BACK PAIN LATERALITY, UNSPECIFIED CHRONICITY, UNSPECIFIED WHETHER SCIATICA PRESENT: ICD-10-CM

## 2025-06-02 PROCEDURE — 97161 PT EVAL LOW COMPLEX 20 MIN: CPT

## 2025-06-02 PROCEDURE — 97110 THERAPEUTIC EXERCISES: CPT

## 2025-06-04 ENCOUNTER — CLINICAL SUPPORT (OUTPATIENT)
Dept: REHABILITATION | Facility: HOSPITAL | Age: 35
End: 2025-06-04
Payer: COMMERCIAL

## 2025-06-04 DIAGNOSIS — S46.812A STRAIN OF LEFT TRAPEZIUS MUSCLE, INITIAL ENCOUNTER: Primary | ICD-10-CM

## 2025-06-04 PROCEDURE — 97112 NEUROMUSCULAR REEDUCATION: CPT | Mod: CQ

## 2025-06-04 PROCEDURE — 97110 THERAPEUTIC EXERCISES: CPT | Mod: CQ

## 2025-06-11 ENCOUNTER — CLINICAL SUPPORT (OUTPATIENT)
Dept: REHABILITATION | Facility: HOSPITAL | Age: 35
End: 2025-06-11
Payer: COMMERCIAL

## 2025-06-11 DIAGNOSIS — S46.812A STRAIN OF LEFT TRAPEZIUS MUSCLE, INITIAL ENCOUNTER: Primary | ICD-10-CM

## 2025-06-11 PROCEDURE — 97110 THERAPEUTIC EXERCISES: CPT | Mod: CQ

## 2025-06-11 PROCEDURE — 97112 NEUROMUSCULAR REEDUCATION: CPT | Mod: CQ

## 2025-06-11 NOTE — PROGRESS NOTES
" [facility] - NEUROLOGY [address]TH [address] [facility], [place]         Patient ID: [id number]  Referring Physician: Dafne Topete,*    Chief Complaint/Reason for Consult: Referred for cervical myelopathy and cervical radiculopathy     Subjective:     ISAAC Morales is a 34 y.o. RH female who is referred to neurology for cervical myelopathy and cervical radiculopathy.  She has not had an MRI or NCS/EMG.    Symptoms occurred on April 1st at 3:30pm while holding salad plates at work, resulting in immediate shooting pain and pain in the left chest and sternal region. She held herself toward the chest and may have bruised herself.  May 6th, she developed left upper back pain while riding her bicycle and looking up at a bird, describing a pulling sensation in her back when turning her head left and upward.  She heard a popping sound in the shoulder area.  Pain worsened on May 9th requiring her to leave work early.  Attempted heat therapy which exacerbated symptoms. X-rays obtained indicate early arthritic changes.    Symptoms have progressed.  Radiating pain down shoulder to scapular/upper thoracic area.  Denies numbness or tingling.    She's used a chainsaw to pull large weeds and notes symptoms may have related to that as well.    She's seen orthopedics whose assessment was left trapezius muscle strain.  She was referred to PT but has not noted improvement after 3 sessions.  She's taken daily Naproxen (prescribed), and symptoms are unrelieved and possibly worse.  A Medrol DosePak did not help.    Other:  Low back pain, chronic.  She will wear a lumbar brace at work.  She doesn't feel urge to defecate until she's sitting on the toilet.  This has gone on a couple of years.  She denies bowel incontinence but has almost had accidents.  Doesn't relate this to diet as she is mindful of what she eats (gluten sensitivity).    Says she also has a "nervous bladder."  Goes frequently, but this " is not new.  Reports having had an US in the past which showed a small bladder.      Review of Systems   Constitutional: Negative.    Genitourinary:  Positive for frequency and urgency.   Musculoskeletal:  Positive for neck pain.   All other systems reviewed and are negative.      Past Medical History:  -------------------------------------    Allergy    Anxiety    Depression       Allergies:  Review of patient's allergies indicates:   Allergen Reactions    Latex, natural rubber Rash       Pertinent Family History:  Family History   Problem Relation Name Age of Onset    Allergies Mother      No Known Problems Father      Asthma Brother Brother     Diabetes Paternal Uncle Pro     Cancer Paternal Uncle Pro     Scleroderma Maternal Grandmother Patricia     Early death Maternal Grandmother Patricia     Diabetes Maternal Grandfather Vamsi     Hashimoto's thyroiditis Other COUSIN     Diabetes Maternal Uncle Hector        Pertinent Social History:  Social History[1]    Medications:  Current Outpatient Medications   Medication Instructions    estradioL (ESTRACE) 1 g, Vaginal, Daily    mometasone (NASONEX) 50 mcg/actuation nasal spray 2 sprays, Nasal, 2 times daily    mupirocin (BACTROBAN) 2 % ointment 2 times daily    naproxen (NAPROSYN) 500 mg, Oral, 2 times daily with meals    sertraline (ZOLOFT) 25 MG tablet TAKE 1 TABLET(25 MG) BY MOUTH DAILY        Objective:     Vitals:    06/12/25 0809   BP: 103/69   Pulse: 75        General:  Well-appearing, well-nourished, NAD, cooperative    Neurologic Exam:   Awake, alert and oriented x3  Speech spontaneous and fluent, intact comprehension.   Adequate fund of knowledge, vocabulary.    CN II - CN XII:  PERRLA. EOM intact. No Nystagmus. No ophthalmoplegia.   Facial sensation is normal to light touch.   Facial expression is full and symmetric.   Hearing is intact bilaterally.   Palate elevates symmetrically.   SCM and Trapezius full strength bilaterally.   Tongue is midline.      Motor:  Normal bulk and tone in all four limbs.   No atrophy or fasciculations. No tremor.    Sensory:     Romberg is negative.    DTRs:   Biceps Brachioradialis Triceps  Patellar Ankle Plantar   Right 3+ 3+ 3+  3+ 3+ flexion   Left 3+ 3+ 3+  3+ 3+ flexion     Coordination:  No abnormal movements with rest or with action    Gait:  Normal casual, heel, toe, and tandem gait.      Pertinent lab results  Lab Results   Component Value Date    [id number] 631 10/27/2022       Lab Results   Component Value Date    [id number] Negative 11/04/2019     Lab Results   Component Value Date    TSH 1.804 05/07/2025    FREET4 0.98 09/05/2019    WBC 11.60 05/07/2025    LYMPH 17.6 (L) 05/07/2025    LYMPH 2.04 05/07/2025    RBC 4.83 05/07/2025    HGB 14.6 05/07/2025    HCT 45.8 05/07/2025    MCV 95 05/07/2025     05/07/2025     05/07/2025    K 4.0 05/07/2025    CO2 26 05/07/2025    BUN 9 05/07/2025    CREATININE 0.9 05/07/2025    CALCIUM 9.7 05/07/2025    AST 18 05/07/2025    ALT 16 05/07/2025       Assessment:   Safia Morales is a 34 y.o. RH female with left upper extremity pain and chronic back pain.  Symptoms are worsening.  Recommend MRI C-spine given persistence of symptoms and hyperreflexia.  Reports of urinary frequency and bowel symptoms (no incontinence but cannot sense urge to have bowel movement) prompt imaging.    Plan:     Nerve conduction studies/EMG of left upper extremity  2.   MRI C-spine w/o  3.   MRI L-spine w/o for lack of sensation with defecation and low back pain.    4.  Will obtain celiac panel and JESSIE w/reflex.      This is a patient with a complex neurologic diagnosis whose overall, ongoing care is being managed and monitored by me and our Neurology clinic.   As such, since 2024,  is the appropriate add-on code to accompany the other E/M billing for this visit.              06/12/2025          [1]   Social History  Tobacco Use    Smoking status: Never     Passive exposure: Past     Smokeless tobacco: Never   Substance Use Topics    Alcohol use: Yes     Alcohol/week: 1.0 standard drink of alcohol     Types: 1 Shots of liquor per week     Comment: social    Drug use: Never

## 2025-06-11 NOTE — PROGRESS NOTES
"  Outpatient Rehab    Physical Therapy Visit    Patient Name: Safia Morales  MRN: [id number]  YOB: 1990  Encounter Date: 6/11/2025    Therapy Diagnosis:   Encounter Diagnosis   Name Primary?    Strain of left trapezius muscle, initial encounter Yes       Physician: Reny Candelaria PA-C    Physician Orders: Eval and Treat  Medical Diagnosis: Strain of left trapezius muscle, initial encounter  Low back pain, unspecified back pain laterality, unspecified chronicity, unspecified whether sciatica present  Osteophyte of spine    Visit # / Visits Authorized:  2 / 20  Insurance Authorization Period: 1/1/2025 to 12/31/2025  Date of Evaluation: 6/2/2025  Plan of Care Certification: 6/2/2025 to 8/2/2025      PT/PTA: PTA   Number of PTA visits since last PT visit:2  Time In: 0800   Time Out: 0859  Total Time (in minutes): 59   Total Billable Time (in minutes): 59    FOTO:  Intake Score: 59%  Survey Score 2:  %  Survey Score 3:  %    Precautions:       Subjective   Pt states that she was not compliant with the HEP after the last tx. She states that while out at dinner on Sunday she got a sharp pain in her L upper back that was about 8/10 on the pain scale. Since Sunday she has been without pain..  Pain reported as 0/10.      Objective            Treatment:     THERAPEUTIC EXERCISES to develop strength, endurance, ROM, and flexibility for 20 minutes including    LTR 2x10 B  Seated QL stretch 3x30"  DKTC 2x10  SL Open books 2x10 3"  Hip dana education - NT  1/2 roll pec stretch 2 min    NEUROMUSCULAR RE-EDUCATION ACTIVITIES to improve Balance, Coordination, Kinesthetic, Sense, Proprioception, and Posture for 39 minutes.  The following were included:    PPT 2x10 3" - NT  Pilates ring abd/add - NT  TrA activation 2x10 - directed to perform while at work today  Bridges 3x10  SL clams lvl 3 loop 2x10 B  UBE 6 min  No monies RTB 2x10  Rowing CC 17# 3x10  Half roll series   - Bear hugs 2x10  - Swimmers 2x10  - [place] " 2x10    THERAPEUTIC ACTIVITIES to improve dynamic and functional performance for 0 minutes including      Time Entry(in minutes):       Assessment & Plan   Assessment: Pt presents to PT with inc'd fatigue and reports of recent L upper back flare up. Despite fatigue patient was able to complete progression aimed at deficits without any increase in sx. Added supine pec stretch with patient not feeling much of a stretch for improved resting posture. Pt may benefit from standing door frame or corner stretch for home and office.       The patient will continue to benefit from skilled outpatient physical therapy in order to address the deficits listed in the problem list on the initial evaluation, provide patient and family education, and maximize the patients level of independence in the home and community environments.     The patient's spiritual, cultural, and educational needs were considered, and the patient is agreeable to the plan of care and goals.           Plan: PT/PTA met face to face to discuss pt's treatment plan and progress towards established goals. Pt will be seen by a physical therapist minimally every 6th visit or every 30 days. Continue POC.    Goals:     Ryan Mckeon PTA

## 2025-06-12 ENCOUNTER — LAB VISIT (OUTPATIENT)
Dept: LAB | Facility: HOSPITAL | Age: 35
End: 2025-06-12
Payer: COMMERCIAL

## 2025-06-12 ENCOUNTER — OFFICE VISIT (OUTPATIENT)
Facility: CLINIC | Age: 35
End: 2025-06-12
Payer: COMMERCIAL

## 2025-06-12 VITALS
HEART RATE: 75 BPM | HEIGHT: 63 IN | SYSTOLIC BLOOD PRESSURE: 103 MMHG | BODY MASS INDEX: 23.79 KG/M2 | DIASTOLIC BLOOD PRESSURE: 69 MMHG | WEIGHT: 134.25 LBS

## 2025-06-12 DIAGNOSIS — G95.9 CERVICAL MYELOPATHY WITH CERVICAL RADICULOPATHY: Primary | ICD-10-CM

## 2025-06-12 DIAGNOSIS — M62.81 MUSCLE WEAKNESS OF LEFT UPPER EXTREMITY: ICD-10-CM

## 2025-06-12 DIAGNOSIS — M54.16 LUMBAR RADICULOPATHY, CHRONIC: ICD-10-CM

## 2025-06-12 DIAGNOSIS — M54.12 CERVICAL MYELOPATHY WITH CERVICAL RADICULOPATHY: Primary | ICD-10-CM

## 2025-06-12 DIAGNOSIS — R19.8 GI SYMPTOMS: ICD-10-CM

## 2025-06-12 PROCEDURE — 86235 NUCLEAR ANTIGEN ANTIBODY: CPT | Mod: 59 | Performed by: PSYCHIATRY & NEUROLOGY

## 2025-06-12 PROCEDURE — 36415 COLL VENOUS BLD VENIPUNCTURE: CPT

## 2025-06-12 PROCEDURE — 99999 PR PBB SHADOW E&M-EST. PATIENT-LVL III: CPT | Mod: PBBFAC,,, | Performed by: PSYCHIATRY & NEUROLOGY

## 2025-06-12 PROCEDURE — 86258 DGP ANTIBODY EACH IG CLASS: CPT | Performed by: PSYCHIATRY & NEUROLOGY

## 2025-06-12 PROCEDURE — 86225 DNA ANTIBODY NATIVE: CPT | Performed by: PSYCHIATRY & NEUROLOGY

## 2025-06-12 PROCEDURE — 86235 NUCLEAR ANTIGEN ANTIBODY: CPT | Performed by: PSYCHIATRY & NEUROLOGY

## 2025-06-12 PROCEDURE — 86038 ANTINUCLEAR ANTIBODIES: CPT | Performed by: PSYCHIATRY & NEUROLOGY

## 2025-06-13 ENCOUNTER — CLINICAL SUPPORT (OUTPATIENT)
Dept: REHABILITATION | Facility: HOSPITAL | Age: 35
End: 2025-06-13
Payer: COMMERCIAL

## 2025-06-13 DIAGNOSIS — S46.812A STRAIN OF LEFT TRAPEZIUS MUSCLE, INITIAL ENCOUNTER: Primary | ICD-10-CM

## 2025-06-13 LAB
ANA (OHS): POSITIVE
ANA PATTERN 1 (OHS): ABNORMAL
ANA TITER 1 (OHS): ABNORMAL

## 2025-06-13 PROCEDURE — 97110 THERAPEUTIC EXERCISES: CPT | Mod: CQ

## 2025-06-13 PROCEDURE — 97112 NEUROMUSCULAR REEDUCATION: CPT | Mod: CQ

## 2025-06-13 NOTE — PROGRESS NOTES
"  Outpatient Rehab    Physical Therapy Visit    Patient Name: Safia Morales  MRN: [id number]  YOB: 1990  Encounter Date: 6/13/2025    Therapy Diagnosis:   Encounter Diagnosis   Name Primary?    Strain of left trapezius muscle, initial encounter Yes         Physician: Reny Candelaria PA-C    Physician Orders: Eval and Treat  Medical Diagnosis: Strain of left trapezius muscle, initial encounter  Low back pain, unspecified back pain laterality, unspecified chronicity, unspecified whether sciatica present  Osteophyte of spine    Visit # / Visits Authorized:  3 / 20  Insurance Authorization Period: 1/1/2025 to 12/31/2025  Date of Evaluation: 6/2/2025  Plan of Care Certification: 6/2/2025 to 8/2/2025      PT/PTA: PTA   Number of PTA visits since last PT visit:3  Time In: 0800   Time Out: 0859  Total Time (in minutes): 59   Total Billable Time (in minutes): 59    FOTO:  Intake Score: 59%  Survey Score 2:  %  Survey Score 3:  %    Precautions:       Subjective   Slight pain in the L side..  Pain reported as 0/10.      Objective            Treatment:     THERAPEUTIC EXERCISES to develop strength, endurance, ROM, and flexibility for 20 minutes including    LTR 2x10 B   Standing QL stretch 3x30"  DKTC 2x10  SL Open books 2x10 3"  1/2 roll pec stretch 2 min    NEUROMUSCULAR RE-EDUCATION ACTIVITIES to improve Balance, Coordination, Kinesthetic, Sense, Proprioception, and Posture for 39 minutes.  The following were included:    Pilates ring abd/add - NT  TrA activation 15x 3 way  Bridges w PPT 3x10  Needle threading 5# 20x B  SL clams lvl 3 loop 2x10 B  UBE 6 min  No monies RTB 2x10  Rowing CC 17# 3x10  Half roll series   - Bear hugs 2x10  - Swimmers 2x10  - [place] 2x10    THERAPEUTIC ACTIVITIES to improve dynamic and functional performance for 0 minutes including      Time Entry(in minutes):       Assessment & Plan   Assessment: Patient presents to therapy with slight pain, pt reported that pain subsided towards end " of treatment. Modified QL stretch to standing as patient felt that she wasn't feeling much in sitting. Pt inquired about FDN, will discuss with evaluating PT. Educated patient correct form on transferring weighted objects at job, and plan to incorporate into upcoming visits.  Evaluation/Treatment Tolerance: Patient tolerated treatment well    The patient will continue to benefit from skilled outpatient physical therapy in order to address the deficits listed in the problem list on the initial evaluation, provide patient and family education, and maximize the patients level of independence in the home and community environments.     The patient's spiritual, cultural, and educational needs were considered, and the patient is agreeable to the plan of care and goals.           Plan: PT/PTA met face to face to discuss pt's treatment plan and progress towards established goals. Pt will be seen by a physical therapist minimally every 6th visit or every 30 days. Continue POC.    Goals:     Ryan Mckeon PTA

## 2025-06-16 ENCOUNTER — CLINICAL SUPPORT (OUTPATIENT)
Dept: REHABILITATION | Facility: HOSPITAL | Age: 35
End: 2025-06-16
Payer: COMMERCIAL

## 2025-06-16 DIAGNOSIS — M54.50 ACUTE LEFT-SIDED LOW BACK PAIN WITHOUT SCIATICA: Primary | ICD-10-CM

## 2025-06-16 LAB
DSDNA ANTIBODY (OHS): NORMAL
DSDNA ANTIBODY TITER (OHS): NORMAL

## 2025-06-16 PROCEDURE — 97014 ELECTRIC STIMULATION THERAPY: CPT

## 2025-06-16 PROCEDURE — 97140 MANUAL THERAPY 1/> REGIONS: CPT

## 2025-06-16 PROCEDURE — 97112 NEUROMUSCULAR REEDUCATION: CPT

## 2025-06-16 PROCEDURE — 97110 THERAPEUTIC EXERCISES: CPT

## 2025-06-16 NOTE — PROGRESS NOTES
"  Outpatient Rehab    Physical Therapy Visit    Patient Name: Safia Morales  MRN: [id number]  YOB: 1990  Encounter Date: 6/16/2025    Therapy Diagnosis:   No diagnosis found.        Physician: Reny Candelaria PA-C    Physician Orders: Eval and Treat  Medical Diagnosis: Strain of left trapezius muscle, initial encounter  Low back pain, unspecified back pain laterality, unspecified chronicity, unspecified whether sciatica present  Osteophyte of spine    Visit # / Visits Authorized:  4 / 20  Insurance Authorization Period: 1/1/2025 to 12/31/2025  Date of Evaluation: 6/2/2025  Plan of Care Certification: 6/2/2025 to 8/2/2025      PT/PTA: PT   Number of PTA visits since last PT visit:0  Time In: 0800   Time Out: 0900  Total Time (in minutes): 60   Total Billable Time (in minutes): 60    FOTO:  Intake Score:  %  Survey Score 2:  %  Survey Score 3:  %    Precautions:       Subjective   Patient reports her pain is about the same. Patient reports her pain is only when she bends down and foward. Patient feels that the intensity and frequency of the pain is the same.  Pain reported as 2/10.      Objective            Treatment:     THERAPEUTIC EXERCISES to develop strength, endurance, ROM, and flexibility for 25 minutes including    Subjective     DKTC 2x10 (Heat)  LTR 2x10 B (Heat)  Cat camel romulo pose  - Romulo pose to the R (stretching L side)       Not today   Standing QL stretch 3x30"  SL Open books 2x10 3"  1/2 roll pec stretch 2 min    NEUROMUSCULAR RE-EDUCATION ACTIVITIES to improve Balance, Coordination, Kinesthetic, Sense, Proprioception, and Posture for 25 minutes.  The following were included:      TrA activation 15x 3 way  Bridges w PPT 3x10  - level 3 loop  SL clams lvl 3 loop 3x10 B          Needle threading 5# 20x B  UBE 6 min  No monies RTB 2x10  Rowing CC 17# 3x10  Half roll series   - Bear hugs 2x10  - Swimmers 2x10  - [place] 2x10    THERAPEUTIC ACTIVITIES to improve dynamic and functional " performance for 0 minutes including      MANUAL THERAPY TECHNIQUES  were applied to L lumbar quadrant for 10 minutes.    Palpation to determine trigger point locations.       Functional Dry Needling to L lumbar paraspinals at levels L2-L4. Patient hooked to estim today and had great muscular contraction. Patient had no adverse effects from today       Assessment & Plan   Assessment: Patient presents to PT with continued L sided back pain and no change. Patient was progressed with bridging, needling, and lumbar mobility today. Patient responded well to heat with exercise today. Patient had no adverse effects from today.   Evaluation/Treatment Tolerance: Patient tolerated treatment well    The patient will continue to benefit from skilled outpatient physical therapy in order to address the deficits listed in the problem list on the initial evaluation, provide patient and family education, and maximize the patients level of independence in the home and community environments.     The patient's spiritual, cultural, and educational needs were considered, and the patient is agreeable to the plan of care and goals.           Plan: PT/PTA met face to face to discuss pt's treatment plan and progress towards established goals. Pt will be seen by a physical therapist minimally every 6th visit or every 30 days. Continue POC.    Goals:   Active       Pain       Patient will report pain of 0/10 demonstrating a reduction of overall pain (Progressing)       Start:  06/16/25    Expected End:  08/02/25               Range of Motion       Patient will achieve spinal flexion to WNL (Progressing)       Start:  06/16/25    Expected End:  08/02/25            Patient will achieve spinal extension to WNL (Progressing)       Start:  06/16/25    Expected End:  08/02/25            Patient will achieve bilateral spinal side bending ROM WNL (Progressing)       Start:  06/16/25    Expected End:  08/02/25               Strength       Patient will  achieve bilateral LE strength of 5/5 (Progressing)       Start:  06/16/25    Expected End:  08/02/25                Triston Coe PT, CASANDRAT

## 2025-06-17 ENCOUNTER — PATIENT MESSAGE (OUTPATIENT)
Dept: ORTHOPEDICS | Facility: CLINIC | Age: 35
End: 2025-06-17
Payer: COMMERCIAL

## 2025-06-17 LAB
SM  ANTIBODY (OHS): 0.11 RATIO
SM INTERPRETATION (OHS): NEGATIVE
SM/RNP ANTIBODY (OHS): 0.1 RATIO
SM/RNP INTERPRETATION (OHS): NEGATIVE
SSA  ANTIBODY (OHS): 0.08 RATIO (ref 0–0.99)
SSA INTERPRETATION (OHS): NEGATIVE
SSB  ANTIBODY (OHS): 0.08 RATIO
SSB INTERPRETATION (OHS): NEGATIVE
W GLIADIN AB IGA, DEAMIDATED: 0.5 U/ML
W GLIADIN AB IGG, DEAMIDATED: 0.8 U/ML
W IMMUNOGLOBULIN A (IGA): 153 MG/DL
W TISSUE TRANSGLUTAMINASE IGA AB: <0.2 U/ML
W TISSUE TRANSGLUTAMINASE IGG: <0.6 U/ML

## 2025-06-18 ENCOUNTER — CLINICAL SUPPORT (OUTPATIENT)
Dept: REHABILITATION | Facility: HOSPITAL | Age: 35
End: 2025-06-18
Payer: COMMERCIAL

## 2025-06-18 DIAGNOSIS — M54.50 ACUTE LEFT-SIDED LOW BACK PAIN WITHOUT SCIATICA: Primary | ICD-10-CM

## 2025-06-18 PROCEDURE — 97112 NEUROMUSCULAR REEDUCATION: CPT | Mod: CQ

## 2025-06-18 PROCEDURE — 97110 THERAPEUTIC EXERCISES: CPT | Mod: CQ

## 2025-06-18 NOTE — PROGRESS NOTES
"  Outpatient Rehab    Physical Therapy Visit    Patient Name: Safia Morales  MRN: [id number]  YOB: 1990  Encounter Date: 6/18/2025    Therapy Diagnosis:   Encounter Diagnosis   Name Primary?    Acute left-sided low back pain without sciatica Yes           Physician: Reny Candelaria PA-C    Physician Orders: Eval and Treat  Medical Diagnosis: Strain of left trapezius muscle, initial encounter  Low back pain, unspecified back pain laterality, unspecified chronicity, unspecified whether sciatica present  Osteophyte of spine    Visit # / Visits Authorized:  5 / 20  Insurance Authorization Period: 1/1/2025 to 12/31/2025  Date of Evaluation: 6/2/2025  Plan of Care Certification: 6/2/2025 to 8/2/2025      PT/PTA: PTA   Number of PTA visits since last PT visit:1  Time In: 0800   Time Out: 0907  Total Time (in minutes): 67   Total Billable Time (in minutes): 57    FOTO:  Intake Score: 59%  Survey Score 2: 76%  Survey Score 3:  %    Precautions:       Subjective   Pt feels that the needling did help last visit. Yesterday was the first day that she didn't have to wear the back brace..  Pain reported as 0/10.      Objective            Treatment:     THERAPEUTIC EXERCISES to develop strength, endurance, ROM, and flexibility for 23 minutes including    Subjective     DKTC 2x10 (Heat)  LTR 2x10 B (Heat)  Cat camel romulo pose  - Romulo pose to the R (stretching L side)       Not today   Standing QL stretch 3x30"  SL Open books 2x10 3"  1/2 roll pec stretch 2 min    NEUROMUSCULAR RE-EDUCATION ACTIVITIES to improve Balance, Coordination, Kinesthetic, Sense, Proprioception, and Posture for 24 minutes.  The following were included:      TrA activation 15x 3 way  Bridges w PPT 3x10  - level 3 loop  SL clams lvl 3 loop 3x10 B      Needle threading 5# 20x B  UBE 6 min  No monies RTB 2x10  Rowing CC 17# 3x10  Half roll series   - Bear hugs 2x10  - Swimmers 2x10  - "[place] 2x10    THERAPEUTIC ACTIVITIES to improve dynamic and " functional performance for 0 minutes including      MANUAL THERAPY TECHNIQUES  were applied to L lumbar quadrant for 10 minutes.    Palpation to determine trigger point locations. - NT  Cupping (not billed)    Functional Dry Needling to L lumbar paraspinals at levels L2-L4. Patient hooked to estim today and had great muscular contraction. Patient had no adverse effects from today       Assessment & Plan   Assessment: Pt presents to therapy with very mild pain which is much improved since last session. Addressed new painful areas with trial of cupping with good response.  Evaluation/Treatment Tolerance: Patient tolerated treatment well    The patient will continue to benefit from skilled outpatient physical therapy in order to address the deficits listed in the problem list on the initial evaluation, provide patient and family education, and maximize the patients level of independence in the home and community environments.     The patient's spiritual, cultural, and educational needs were considered, and the patient is agreeable to the plan of care and goals.           Plan: PT/PTA met face to face to discuss pt's treatment plan and progress towards established goals. Pt will be seen by a physical therapist minimally every 6th visit or every 30 days. Continue POC.    Goals:   Active       Pain       Patient will report pain of 0/10 demonstrating a reduction of overall pain (Progressing)       Start:  06/16/25    Expected End:  08/02/25               Range of Motion       Patient will achieve spinal flexion to WNL (Progressing)       Start:  06/16/25    Expected End:  08/02/25            Patient will achieve spinal extension to WNL (Progressing)       Start:  06/16/25    Expected End:  08/02/25            Patient will achieve bilateral spinal side bending ROM WNL (Progressing)       Start:  06/16/25    Expected End:  08/02/25               Strength       Patient will achieve bilateral LE strength of 5/5 (Progressing)        Start:  06/16/25    Expected End:  08/02/25                Ryan Mckeon, PTA

## 2025-06-19 ENCOUNTER — PATIENT MESSAGE (OUTPATIENT)
Dept: INTERNAL MEDICINE | Facility: CLINIC | Age: 35
End: 2025-06-19
Payer: COMMERCIAL

## 2025-06-19 ENCOUNTER — RESULTS FOLLOW-UP (OUTPATIENT)
Facility: CLINIC | Age: 35
End: 2025-06-19
Payer: COMMERCIAL

## 2025-06-20 NOTE — TELEPHONE ENCOUNTER
LOV with Dafne Topete NP , 5/7/2025    Pt is requesting PCP review her recent mildly elevated JESSIE that was ordered by neuro. Attempted to schedule a VV to discuss, but no avail until late July. Please let me know if you prefer an appt

## 2025-06-27 ENCOUNTER — HOSPITAL ENCOUNTER (OUTPATIENT)
Dept: RADIOLOGY | Facility: HOSPITAL | Age: 35
Discharge: HOME | End: 2025-06-27
Attending: PSYCHIATRY & NEUROLOGY
Payer: COMMERCIAL

## 2025-06-27 ENCOUNTER — CLINICAL SUPPORT (OUTPATIENT)
Dept: REHABILITATION | Facility: HOSPITAL | Age: 35
End: 2025-06-27
Payer: COMMERCIAL

## 2025-06-27 DIAGNOSIS — M54.12 CERVICAL MYELOPATHY WITH CERVICAL RADICULOPATHY: ICD-10-CM

## 2025-06-27 DIAGNOSIS — M54.50 ACUTE LEFT-SIDED LOW BACK PAIN WITHOUT SCIATICA: Primary | ICD-10-CM

## 2025-06-27 DIAGNOSIS — G95.9 CERVICAL MYELOPATHY WITH CERVICAL RADICULOPATHY: ICD-10-CM

## 2025-06-27 DIAGNOSIS — M54.16 LUMBAR RADICULOPATHY, CHRONIC: ICD-10-CM

## 2025-06-27 DIAGNOSIS — M62.81 MUSCLE WEAKNESS OF LEFT UPPER EXTREMITY: ICD-10-CM

## 2025-06-27 PROCEDURE — 72141 MRI NECK SPINE W/O DYE: CPT | Mod: 26,,, | Performed by: INTERNAL MEDICINE

## 2025-06-27 PROCEDURE — 97110 THERAPEUTIC EXERCISES: CPT | Mod: CQ

## 2025-06-27 PROCEDURE — 72141 MRI NECK SPINE W/O DYE: CPT | Mod: TC

## 2025-06-27 PROCEDURE — 72148 MRI LUMBAR SPINE W/O DYE: CPT | Mod: TC

## 2025-06-27 PROCEDURE — 97112 NEUROMUSCULAR REEDUCATION: CPT | Mod: CQ

## 2025-06-27 PROCEDURE — 72148 MRI LUMBAR SPINE W/O DYE: CPT | Mod: 26,,, | Performed by: INTERNAL MEDICINE

## 2025-06-27 NOTE — PROGRESS NOTES
"  Outpatient Rehab    Physical Therapy Visit    Patient Name: Safia Morales  MRN: [id number]  YOB: 1990  Encounter Date: 6/27/2025    Therapy Diagnosis:   Encounter Diagnosis   Name Primary?    Acute left-sided low back pain without sciatica Yes             Physician: Reny Candelaria PA-C    Physician Orders: Eval and Treat  Medical Diagnosis: Strain of left trapezius muscle, initial encounter  Low back pain, unspecified back pain laterality, unspecified chronicity, unspecified whether sciatica present  Osteophyte of spine    Visit # / Visits Authorized:  6 / 20  Insurance Authorization Period: 1/1/2025 to 12/31/2025  Date of Evaluation: 6/2/2025  Plan of Care Certification: 6/2/2025 to 8/2/2025      PT/PTA: PTA   Number of PTA visits since last PT visit:2  Time In: 0802   Time Out: 0857  Total Time (in minutes): 55   Total Billable Time (in minutes): 55    FOTO:  Intake Score:  %  Survey Score 2:  %  Survey Score 3:  %    Precautions:       Subjective   She has not been wearing the back brace but has it handy just in case. Today she is getting 2 MRI to the L side of her body. She reports L hip pain greater than upper back pain..         Objective            Treatment:     THERAPEUTIC EXERCISES to develop strength, endurance, ROM, and flexibility for 23 minutes including    DKTC 2x10 (Heat)  LTR 2x10 B (Heat)  Cat camel romulo pose  - Romulo pose to the R (stretching L side)       Not today   Standing QL stretch 3x30"  SL Open books 2x10 3"  1/2 roll pec stretch 2 min  Needle threading 5# 20x B  No monies RTB 2x10    NEUROMUSCULAR RE-EDUCATION ACTIVITIES to improve Balance, Coordination, Kinesthetic, Sense, Proprioception, and Posture for 32 minutes.  The following were included:    Serratus wall slides 20x  TrA activation 15x 3 way  Bridges w PPT 3x10  - level 3 loop  SL clams lvl 3 loop 3x10 B  UBE 6 min in standing  Rowing CC 17# 3x10  Half roll series   - Bear hugs 2x10  - Swimmers 2x10  - [place] " 2x10      THERAPEUTIC ACTIVITIES to improve dynamic and functional performance for 0 minutes including      MANUAL THERAPY TECHNIQUES  were applied to L lumbar quadrant for 0 minutes.    Palpation to determine trigger point locations. - NT  Cupping (not billed)    Functional Dry Needling to L lumbar paraspinals at levels L2-L4. Patient hooked to estim today and had great muscular contraction. Patient had no adverse effects from today       Assessment & Plan   Assessment: Pt presents without sx in upper L back. Session with emphasis on thoracic mobility, posture and periscapular strength. Will assess next session for FDN pending presentation.  Evaluation/Treatment Tolerance: Patient tolerated treatment well    The patient will continue to benefit from skilled outpatient physical therapy in order to address the deficits listed in the problem list on the initial evaluation, provide patient and family education, and maximize the patients level of independence in the home and community environments.     The patient's spiritual, cultural, and educational needs were considered, and the patient is agreeable to the plan of care and goals.           Plan: Continue with POC towards PT goals.    Goals:   Active       Pain       Patient will report pain of 0/10 demonstrating a reduction of overall pain (Progressing)       Start:  06/16/25    Expected End:  08/02/25               Range of Motion       Patient will achieve spinal flexion to WNL (Progressing)       Start:  06/16/25    Expected End:  08/02/25            Patient will achieve spinal extension to WNL (Progressing)       Start:  06/16/25    Expected End:  08/02/25            Patient will achieve bilateral spinal side bending ROM WNL (Progressing)       Start:  06/16/25    Expected End:  08/02/25               Strength       Patient will achieve bilateral LE strength of 5/5 (Progressing)       Start:  06/16/25    Expected End:  08/02/25                Ryan Mckeon  PTA

## 2025-07-01 ENCOUNTER — CLINICAL SUPPORT (OUTPATIENT)
Dept: REHABILITATION | Facility: HOSPITAL | Age: 35
End: 2025-07-01
Attending: PHYSICIAN ASSISTANT
Payer: COMMERCIAL

## 2025-07-01 DIAGNOSIS — G89.29 CHRONIC LEFT-SIDED LOW BACK PAIN WITHOUT SCIATICA: Primary | ICD-10-CM

## 2025-07-01 DIAGNOSIS — M54.50 CHRONIC LEFT-SIDED LOW BACK PAIN WITHOUT SCIATICA: Primary | ICD-10-CM

## 2025-07-01 PROCEDURE — 97140 MANUAL THERAPY 1/> REGIONS: CPT | Mod: CG

## 2025-07-01 PROCEDURE — 20560 NDL INSJ W/O NJX 1 OR 2 MUSC: CPT

## 2025-07-01 PROCEDURE — 97110 THERAPEUTIC EXERCISES: CPT

## 2025-07-01 PROCEDURE — 97112 NEUROMUSCULAR REEDUCATION: CPT

## 2025-07-01 NOTE — PROGRESS NOTES
"  Outpatient Rehab    Physical Therapy Visit    Patient Name: Safia Morales  MRN: [id number]  YOB: 1990  Encounter Date: 7/1/2025    Therapy Diagnosis:   No diagnosis found.            Physician: Reny Candelaria PA-C    Physician Orders: Eval and Treat  Medical Diagnosis: Strain of left trapezius muscle, initial encounter  Low back pain, unspecified back pain laterality, unspecified chronicity, unspecified whether sciatica present  Osteophyte of spine    Visit # / Visits Authorized:  7 / 20  Insurance Authorization Period: 1/1/2025 to 12/31/2025  Date of Evaluation: 6/2/2025  Plan of Care Certification: 6/2/2025 to 8/2/2025      PT/PTA: PT   Number of PTA visits since last PT visit:0  Time In: 1300   Time Out: 1400  Total Time (in minutes): 60   Total Billable Time (in minutes): 60    FOTO:  Intake Score:  %  Survey Score 2:  %  Survey Score 3:  %    Precautions:       Subjective   Patient reports she has been feeling much better. Patient reports L shoulder/neck/mid back has been feeling good. Patient reports her pain she is dealing with the most is L hip. Patient reprots her pain is feeling better overall. Patient reports she is unsure of what caused her pain. Patient reports her pain is primarily with walking and stairs, but that pain has improved overall. Today her L hip pain is 0/10..  Pain reported as 0/10.      Objective            Treatment:     THERAPEUTIC EXERCISES to develop strength, endurance, ROM, and flexibility for 20 minutes including    DKTC 2x10 (Heat)  LTR 2x10 B (Heat)  Piriformis stretch (heat)   - 30s x 3   Cat camel romulo pose  - Romulo pose to the R (stretching L side)       Not today   Standing QL stretch 3x30"  SL Open books 2x10 3"  1/2 roll pec stretch 2 min  Needle threading 5# 20x B  No monies RTB 2x10    NEUROMUSCULAR RE-EDUCATION ACTIVITIES to improve Balance, Coordination, Kinesthetic, Sense, Proprioception, and Posture for 25 minutes.  The following were " included:    UBE 6 min in standing  Bridges w PPT 3x10  - level 3 loop  SL clams lvl 3 loop 3x10 B  Half roll series   - Bear hugs 2x10  - Swimmers 2x10  - [place] 2x10  Serratus wall slides 20x  TrA activation 15x 3 way  Rowing CC 17# 3x10        THERAPEUTIC ACTIVITIES to improve dynamic and functional performance for 0 minutes including      MANUAL THERAPY TECHNIQUES  were applied to L lumbar quadrant for 10 minutes.    Palpation to determine trigger point locations.     Functional Dry Needling to L lumbar paraspinals at levels L2-L4. Patient hooked to estim today and had great muscular contraction. Patient had no adverse effects from today     STM to low back       Assessment & Plan   Assessment: Pt presents without sx in upper L back. Session with emphasis on thoracic mobility, posture and periscapular strength. Patient did great with dry needling today without adverse effects. Will discharge within next two sessions if continued to have good progress  Evaluation/Treatment Tolerance: Patient tolerated treatment well    The patient will continue to benefit from skilled outpatient physical therapy in order to address the deficits listed in the problem list on the initial evaluation, provide patient and family education, and maximize the patients level of independence in the home and community environments.     The patient's spiritual, cultural, and educational needs were considered, and the patient is agreeable to the plan of care and goals.           Plan: Discharge after 2 more sessions if continuing to feel good    Goals:   Active       Pain       Patient will report pain of 0/10 demonstrating a reduction of overall pain (Progressing)       Start:  06/16/25    Expected End:  08/02/25               Range of Motion       Patient will achieve spinal flexion to WNL (Progressing)       Start:  06/16/25    Expected End:  08/02/25            Patient will achieve spinal extension to WNL (Progressing)       Start:   06/16/25    Expected End:  08/02/25            Patient will achieve bilateral spinal side bending ROM WNL (Progressing)       Start:  06/16/25    Expected End:  08/02/25               Strength       Patient will achieve bilateral LE strength of 5/5 (Progressing)       Start:  06/16/25    Expected End:  08/02/25                Triston Coe PT, DPT

## 2025-07-03 ENCOUNTER — CLINICAL SUPPORT (OUTPATIENT)
Dept: REHABILITATION | Facility: HOSPITAL | Age: 35
End: 2025-07-03
Payer: COMMERCIAL

## 2025-07-03 DIAGNOSIS — G89.29 CHRONIC LEFT-SIDED LOW BACK PAIN WITHOUT SCIATICA: Primary | ICD-10-CM

## 2025-07-03 DIAGNOSIS — M54.50 CHRONIC LEFT-SIDED LOW BACK PAIN WITHOUT SCIATICA: Primary | ICD-10-CM

## 2025-07-03 PROCEDURE — 97110 THERAPEUTIC EXERCISES: CPT

## 2025-07-03 PROCEDURE — 97112 NEUROMUSCULAR REEDUCATION: CPT

## 2025-07-03 NOTE — PROGRESS NOTES
"  Outpatient Rehab    Physical Therapy Visit    Patient Name: Safia Morales  MRN: [id number]  YOB: 1990  Encounter Date: 7/3/2025    Therapy Diagnosis:   Encounter Diagnosis   Name Primary?    Chronic left-sided low back pain without sciatica Yes               Physician: Reny Candelaria PA-C    Physician Orders: Eval and Treat  Medical Diagnosis: Strain of left trapezius muscle, initial encounter  Low back pain, unspecified back pain laterality, unspecified chronicity, unspecified whether sciatica present  Osteophyte of spine    Visit # / Visits Authorized:  8 / 20  Insurance Authorization Period: 1/1/2025 to 12/31/2025  Date of Evaluation: 6/2/2025  Plan of Care Certification: 6/2/2025 to 8/2/2025      PT/PTA: PT   Number of PTA visits since last PT visit:0  Time In: 0805   Time Out: 0900  Total Time (in minutes): 55   Total Billable Time (in minutes): 55    FOTO:  Intake Score: 59%  Survey Score 2: 76%  Survey Score 3: 94%    Precautions:       Subjective   Patient reports she has been feeling much better. Patient reports L shoulder/neck/mid back has been feeling good. Patient reports her pain in the L hip has improved as well. Patient reprots her pain is feeling better overall..  Pain reported as 0/10.      Objective            Treatment:     THERAPEUTIC EXERCISES to develop strength, endurance, ROM, and flexibility for 15 minutes including    DKTC 2x10 (Heat)  LTR 2x10 B (Heat)  Piriformis stretch (heat)   - 30s x 3   Cat camel romulo pose  - Romulo pose to the R (stretching L side)       Not today   Standing QL stretch 3x30"  SL Open books 2x10 3"  1/2 roll pec stretch 2 min  Needle threading 5# 20x B      NEUROMUSCULAR RE-EDUCATION ACTIVITIES to improve Balance, Coordination, Kinesthetic, Sense, Proprioception, and Posture for 40 minutes.  The following were included:    UBE 6 min in standing  Bridges w PPT 3x10  - level 3 loop  SL clams lvl 3 loop 3x10 B  Half roll series   - Bear hugs 2x10  - " Swimmers 2x10  - Freer 2x10  No monies RTB 2x10  Serratus wall slides 20x  TrA activation 15x 3 way  Rowing CC 17# 3x10        THERAPEUTIC ACTIVITIES to improve dynamic and functional performance for 0 minutes including      MANUAL THERAPY TECHNIQUES  were applied to L lumbar quadrant for 0 minutes.    Palpation to determine trigger point locations.     Functional Dry Needling to L lumbar paraspinals at levels L2-L4. Patient hooked to estim today and had great muscular contraction. Patient had no adverse effects from today     STM to low back       Assessment & Plan   Assessment: Pt presents without sx in upper and lower back. Session with emphasis on thoracic mobility, posture and periscapular strength. Will discharge within next two sessions if continued to have good progress after trip  Evaluation/Treatment Tolerance: Patient tolerated treatment well    The patient will continue to benefit from skilled outpatient physical therapy in order to address the deficits listed in the problem list on the initial evaluation, provide patient and family education, and maximize the patients level of independence in the home and community environments.     The patient's spiritual, cultural, and educational needs were considered, and the patient is agreeable to the plan of care and goals.           Plan: Discharge if continuing to feel good next session    Goals:   Active       Range of Motion       Patient will achieve spinal flexion to WNL (Progressing)       Start:  06/16/25    Expected End:  08/02/25            Patient will achieve spinal extension to WNL (Progressing)       Start:  06/16/25    Expected End:  08/02/25            Patient will achieve bilateral spinal side bending ROM WNL (Progressing)       Start:  06/16/25    Expected End:  08/02/25               Strength       Patient will achieve bilateral LE strength of 5/5 (Progressing)       Start:  06/16/25    Expected End:  08/02/25              Resolved       Pain        Patient will report pain of 0/10 demonstrating a reduction of overall pain (Met)       Start:  06/16/25    Expected End:  08/02/25    Resolved:  07/03/25             Triston Coe PT, DPT

## 2025-07-29 NOTE — PROGRESS NOTES
07/30/2025    SUBJECTIVE:   35 y.o. female for annual exam.      Past Medical History:   Diagnosis Date    Allergy     Anxiety     Depression        Past Surgical History:   Procedure Laterality Date    EYE SURGERY  I dont know    Lazy eye twice / last time was 4/5 grade    STRABISMUS SURGERY      x 2    WISDOM TOOTH EXTRACTION  2005       Family History   Problem Relation Name Age of Onset    Allergies Mother      No Known Problems Father      Asthma Brother Brother     Diabetes Paternal Uncle Pro     Cancer Paternal Uncle Pro     Scleroderma Maternal Grandmother Patricia     Early death Maternal Grandmother Patricia     Diabetes Maternal Grandfather Vamsi     Hashimoto's thyroiditis Other COUSIN     Diabetes Maternal Uncle Hector        Social History     Socioeconomic History    Marital status: Single    Number of children: 0   Occupational History     Comment: [employer]   Tobacco Use    Smoking status: Never     Passive exposure: Past    Smokeless tobacco: Never   Substance and Sexual Activity    Alcohol use: Yes     Alcohol/week: 1.0 standard drink of alcohol     Types: 1 Shots of liquor per week     Comment: social    Drug use: Never    Sexual activity: Yes     Partners: Male     Birth control/protection: Condom   Social History Narrative    The patient does exercise regularly (rides bike to work ~8mi/day).          She is not satisfied with weight.    Rates diet as fair to poor.    She does drink at least 1/2 gallon water daily.    She drinks 0-1 coffee/tea/caffeine-containing soft drinks daily.    Total sleep time at night is 8-9 hours.    She works 40 hours per week.    She does wear seat belts.    Hobbies include sewing, embroidery, reading     Social Drivers of Health     Financial Resource Strain: Low Risk  (7/8/2024)    Overall Financial Resource Strain (CARDIA)     Difficulty of Paying Living Expenses: Not hard at all   Food Insecurity: No Food Insecurity (7/8/2024)    Hunger Vital Sign     Worried  About Running Out of Food in the Last Year: Never true     Ran Out of Food in the Last Year: Never true   Transportation Needs: Unmet Transportation Needs (10/23/2022)    PRAPARE - Transportation     Lack of Transportation (Medical): Yes     Lack of Transportation (Non-Medical): No   Physical Activity: Sufficiently Active (2024)    Exercise Vital Sign     Days of Exercise per Week: 5 days     Minutes of Exercise per Session: 40 min   Stress: No Stress Concern Present (2024)    [employer] [place] [employer] - Occupational Stress Questionnaire     Feeling of Stress : Only a little   Housing Stability: Low Risk  (10/23/2022)    Housing Stability Vital Sign     Unable to Pay for Housing in the Last Year: No     Number of Places Lived in the Last Year: 1     Unstable Housing in the Last Year: No       Current Outpatient Medications   Medication Sig Dispense Refill    estradioL (ESTRACE) 0.01 % (0.1 mg/gram) vaginal cream Place 1 g vaginally once daily. 42.5 g 3    mometasone (NASONEX) 50 mcg/actuation nasal spray 2 sprays by Nasal route 2 (two) times daily. 2 each 5    sertraline (ZOLOFT) 25 MG tablet TAKE 1 TABLET(25 MG) BY MOUTH DAILY 90 tablet 1     No current facility-administered medications for this visit.       Review of patient's allergies indicates:   Allergen Reactions    Latex, natural rubber Rash          Regular periods- last 3-4 days.  Around every 28 days  Denies intramenstrual bleeding    2022; 2023 pelvic ultrasound normal  No longer using ocp's      Well Woman:  Pap:2022 normal, negative HPV        Family History  Family History   Problem Relation Name Age of Onset    Allergies Mother      No Known Problems Father      Asthma Brother Brother     Diabetes Paternal Uncle Pro     Cancer Paternal Uncle Pro     Scleroderma Maternal Grandmother Patricia     Early death Maternal Grandmother Patricia     Diabetes Maternal Grandfather Vamsi     Hashimoto's thyroiditis Other  "COUSIN     Diabetes Maternal Uncle Hector       Colon CA: No  Breast CA: No  GYN CA: No   CA: No      ROS:  Feeling well.   No dyspnea or chest pain on exertion.    No abdominal pain, change in bowel habits, black or bloody stools.    No urinary tract symptoms.   GYN ROS: intrmentrual spotting around ovulation, no abnormal bleeding, pelvic pain or discharge, no breast pain or new or enlarging lumps on self exam.   No neurological complaints.    OBJECTIVE:   The patient appears well, alert, oriented x 3, in no distress.  /69 (BP Location: Left arm, Patient Position: Sitting)   Pulse 62   Ht 5' 3" (1.6 m)   Wt 60.3 kg (132 lb 15 oz)   BMI 23.55 kg/m²   ENT normal.  Neck supple. No adenopathy, thyroid is enlarged. RY.   Normal respiratory effort  Heart with regular rate and rhythm.   Abdomen soft without tenderness, guarding, mass or organomegaly.   Extremities show no edema, normal peripheral pulses. Has been having R arm pain, back and flank muscular pain-- followed by neurology  Neurological is normal, no focal findings.    BREAST EXAM: breasts appear normal, no suspicious masses, no skin or nipple changes or axillary nodes.  L nipple inverted    PELVIC EXAM:   VULVA: normal appearing vulva with no masses, tenderness or lesions,   VAGINA: normal appearing vagina with normal color and no discharge, no lesions, mild atrophy  CERVIX: normal appearing cervix without discharge or lesions,   UTERUS: uterus is normal size, shape, consistency and nontender,   ADNEXA: no masses,   RECTAL: deferred    ASSESSMENT:   1. Well woman exam        2. Encounter for screening for cervical cancer  Liquid-Based Pap Smear, Screening                  PLAN:     1. Well woman  --pap today  --takes 2-3 weeks for results    2.  RTC 1 year for annual      20 minutes were spent in face to face time with this patient  100 % of this time was spent in counseling and/or coordination of care    Lolis Dee  [facility]  Division of Female Pelvic Medicine and Reconstructive Surgery  Department of Obstetrics & Gynecology

## 2025-07-30 ENCOUNTER — PROCEDURE VISIT (OUTPATIENT)
Facility: CLINIC | Age: 35
End: 2025-07-30
Payer: COMMERCIAL

## 2025-07-30 ENCOUNTER — OFFICE VISIT (OUTPATIENT)
Dept: UROGYNECOLOGY | Facility: CLINIC | Age: 35
End: 2025-07-30
Payer: COMMERCIAL

## 2025-07-30 ENCOUNTER — OFFICE VISIT (OUTPATIENT)
Facility: CLINIC | Age: 35
End: 2025-07-30
Payer: COMMERCIAL

## 2025-07-30 VITALS
WEIGHT: 132.94 LBS | HEIGHT: 63 IN | DIASTOLIC BLOOD PRESSURE: 69 MMHG | SYSTOLIC BLOOD PRESSURE: 123 MMHG | BODY MASS INDEX: 23.55 KG/M2 | HEART RATE: 62 BPM

## 2025-07-30 DIAGNOSIS — Z12.4 ENCOUNTER FOR SCREENING FOR CERVICAL CANCER: ICD-10-CM

## 2025-07-30 DIAGNOSIS — M62.81 MUSCLE WEAKNESS OF LEFT UPPER EXTREMITY: ICD-10-CM

## 2025-07-30 DIAGNOSIS — M54.12 CERVICAL RADICULOPATHY: ICD-10-CM

## 2025-07-30 DIAGNOSIS — M79.2 NEURALGIA AND NEURITIS: Primary | ICD-10-CM

## 2025-07-30 DIAGNOSIS — R19.8 ABNORMAL DEFECATION: ICD-10-CM

## 2025-07-30 DIAGNOSIS — Z01.419 WELL WOMAN EXAM: Primary | ICD-10-CM

## 2025-07-30 PROCEDURE — 95913 NRV CNDJ TEST 13/> STUDIES: CPT | Mod: S$GLB,,, | Performed by: PSYCHIATRY & NEUROLOGY

## 2025-07-30 PROCEDURE — 1159F MED LIST DOCD IN RCRD: CPT | Mod: CPTII,S$GLB,, | Performed by: NURSE PRACTITIONER

## 2025-07-30 PROCEDURE — 3044F HG A1C LEVEL LT 7.0%: CPT | Mod: CPTII,S$GLB,, | Performed by: NURSE PRACTITIONER

## 2025-07-30 PROCEDURE — 99999 PR PBB SHADOW E&M-EST. PATIENT-LVL IV: CPT | Mod: PBBFAC,,, | Performed by: NURSE PRACTITIONER

## 2025-07-30 PROCEDURE — 3008F BODY MASS INDEX DOCD: CPT | Mod: CPTII,S$GLB,, | Performed by: NURSE PRACTITIONER

## 2025-07-30 PROCEDURE — 1160F RVW MEDS BY RX/DR IN RCRD: CPT | Mod: CPTII,S$GLB,, | Performed by: NURSE PRACTITIONER

## 2025-07-30 PROCEDURE — 99999 PR PBB SHADOW E&M-EST. PATIENT-LVL II: CPT | Mod: PBBFAC,,, | Performed by: PSYCHIATRY & NEUROLOGY

## 2025-07-30 PROCEDURE — 3078F DIAST BP <80 MM HG: CPT | Mod: CPTII,S$GLB,, | Performed by: NURSE PRACTITIONER

## 2025-07-30 PROCEDURE — 99395 PREV VISIT EST AGE 18-39: CPT | Mod: S$GLB,,, | Performed by: NURSE PRACTITIONER

## 2025-07-30 PROCEDURE — 87624 HPV HI-RISK TYP POOLED RSLT: CPT | Performed by: NURSE PRACTITIONER

## 2025-07-30 PROCEDURE — 3074F SYST BP LT 130 MM HG: CPT | Mod: CPTII,S$GLB,, | Performed by: NURSE PRACTITIONER

## 2025-07-30 PROCEDURE — 99499 UNLISTED E&M SERVICE: CPT | Mod: S$GLB,,, | Performed by: PSYCHIATRY & NEUROLOGY

## 2025-07-30 PROCEDURE — 95886 MUSC TEST DONE W/N TEST COMP: CPT | Mod: S$GLB,,, | Performed by: PSYCHIATRY & NEUROLOGY

## 2025-07-30 NOTE — PROCEDURES
Department of Neurology  Phone No: [phone], Fax: [phone]    Neurography & Electromyography Report        Full Name: Safia Morales Gender: Female  Patient ID: [id number] YOB: 1990      Visit Date: 7/30/2025 8:58 AM  Age: 35 Years  Examining Physician: Lolis Samano MD, FAAN   Referring Physician: Lolis Samano MD, FAAN   Technician: XANDER Fair   Height: 5 feet 3 inch  Weight: 134 lbs        Safia Morales [id number] 7/30/2025 8:58 AM     1 of 1  Reason for Referral:  Left upper extremity pain            Safia Morales [id number] 7/30/2025 8:58 AM     1 of 1    History and Examination:    35-yr-old woman with symptoms that began 4/1/25.  Left upper extremity pain (shoulder/scapular area).   No numbness/tingling.  Symptoms have improved.    Summary:  Nerve conduction studies were performed on the bilateral median, ulnar, and radial nerves.  All nerve studies were normal.  EMG was performed on select muscles of the left upper extremity including the cervical paraspinals.  Normal insertional and recruitment activity of motor unit potentials was observed.      Interpretation:     Normal study of the upper extremities      Lolis Samano MD, FAAN  Neuromuscular Consultant  [facility]    Safia Morales [id number] 7/30/2025 8:58 AM     1 of 1               Sensory NCS      Nerve / Sites Rec. Site Segments Onset Lat Peak Lat Onset Dilshad Temp. Amp Distance      ms ms m/s °C µV mm   R Median - Dig II (Antidromic)      Wrist Index Wrist - Index 2.34 3.13 59.7 31.5 50.4 140      Ref.  Ref. <=3.30 <=4.00   >=17.0    L Median - Dig II (Antidromic)      Wrist Index Wrist - Index 2.40 3.13 58.4 31.6 54.4 140      Ref.  Ref. <=3.30 <=4.00   >=17.0    R Ulnar - Dig V (Antidromic)      Wrist Dig V Wrist - Dig V 2.14 2.97 58.5 31.6 46.4 125      Ref.  Ref. <=3.10 <=4.00   >=14.0    L Ulnar - Dig V (Antidromic)      Wrist Dig V Wrist - Dig V 2.03 2.86 59.1 31.6 47.5 120      Ref.   Ref. <=3.10 <=4.00   >=14.0    R Radial - Superficial (Antidromic)      Forearm Wrist Forearm - Wrist 1.20 2.03 83.5 31.7 49.6 100      Ref.  Ref. <=2.20 <=2.80   >=7.0    R Median, Ulnar - Transcarpal comparison      Median Palm Wrist Median Palm - Wrist 1.88 2.40 48.0 31.9 47.6 90      Ulnar Palm Wrist Ulnar Palm - Wrist 1.51 2.08 56.3 31.9 32.5 85     Median Palm - Ulnar Palm         L Median, Ulnar - Transcarpal comparison      Median Palm Wrist Median Palm - Wrist 1.88 2.40 50.7 31.9 45.8 95      Ulnar Palm Wrist Ulnar Palm - Wrist 1.51 2.24 66.2 31.9 25.3 100     Median Palm - Ulnar Palm             Motor NCS      Nerve / Sites Muscle Segments Latency Ref. Velocity Ref. Amplitude Ref. Temp. Dur. Distance      ms ms m/s m/s mV mV °C ms mm   R Median - APB      Wrist APB Wrist - APB 3.29 <=4.40   7.2 >=5.9 31.9 6.4 80      Elbow APB Elbow - Wrist 6.83  58 >=53 7.1  31.7 6.7 205   L Median - APB      Wrist APB Wrist - APB 2.94 <=4.40   8.8 >=5.9 31.9 5.7 80      Elbow APB Elbow - Wrist 6.25  62 >=53 8.8  31.7 5.8 205   R Ulnar - ADM      Wrist ADM Wrist - ADM 2.35 <=3.70   14.7 >=7.9 31.6 5.6 80      B.Elbow ADM B.Elbow - Wrist 5.23  68 >=52 14.1  31.5 5.8 195      A.Elbow ADM A.Elbow - B.Elbow 6.88  61 >=43 14.0  31.5 6.3 100     A.Elbow - Wrist       31.5     L Ulnar - ADM      Wrist ADM Wrist - ADM 2.27 <=3.70   14.4 >=7.9 31.9 5.3 80      B.Elbow ADM B.Elbow - Wrist 5.08  66 >=52 13.6  32.2 5.4 185      A.Elbow ADM A.Elbow - B.Elbow 6.67  66 >=43 13.2  32.2 5.5 105     A.Elbow - Wrist       32.2         F  Wave      Nerve F Latency Ref. M Latency F - M Lat    ms ms ms ms   R Median - APB 24.8 <=28.5 3.6 21.1   R Ulnar - ADM 24.8 <=30.2 2.8 22.0   L Median - APB 23.0 <=28.5 3.2 19.8   L Ulnar - ADM 23.5 <=30.2 2.7 20.8       EMG Summary Table     Spontaneous Recruitment MUAP   Muscle Nerve Roots IA Fib PSW Fasc Other Pattern Amp Dur. PPP   L. Biceps brachii Musculocutaneous C5-C6 N None None None N N N N N    L. Deltoid Axillary C5-C6 N None None None N N N N N   L. First dorsal interosseous Ulnar C8-T1 N None None None N N N N N   L. Cervical paraspinals Spinal C4-C8 N None None None N N N N N   L. Trapezius (upper) Accessory (spinal) C3-C4 N None None None N N N N N

## 2025-07-30 NOTE — PROGRESS NOTES
[facility] - NEUROLOGY [address] [facility], [place]         Patient ID: [id number]       Follow up after MRI/NCS and EMG     Subjective:        History of Present Illness       Safia Morales is a 35 y.o. RH female with left upper extremity pain and chronic back pain who is seen in follow up after MRI scans (C- and L-spine) and NCS/EMG.  Findings were reviewed.    Labs after last visit:  +JESSIE 1:80, negative celiac panel    MRI C-spine:  6/27/25  Mild disc degeneration in the upper cervical spine.  No spinal canal or neural foraminal stenosis.    MRI L-spine: 6/27/25  Impression:  Bilateral L5 pars defects.  No spondylolisthesis.  No spinal canal or neural foraminal stenosis.    Xray thoracic spine:  Minor dextroscoliosis mid dorsal spine.  Minimal mild anterior spondylosis, partial ossification anterior and disc fibrosis mid lower dorsal spine.         FOOT PAIN:  She has a history of foot pain related to an extra muscle or muscle split affecting a nerve. Initially experienced severe pain causing difficulty walking, requiring boot immobilization and physical therapy. After receiving a cortisone injection for the foot condition, symptoms largely resolved with only occasional pain still present. She currently manages intermittent pain with Voltaren cream topically and denies current significant foot pain or functional limitations.    PHYSICAL THERAPY:  She reports receiving dry needling treatment which was helpful. Her last physical therapy session was prior to Fourth of July. She appears optimistic about potential rehabilitation based on recent medical discussions.    GASTROINTESTINAL SYMPTOMS:  She reports an inability to sense impending bowel movements until sitting on the toilet. She acknowledges being lactose intolerant but continues to consume dairy products, particularly cheese, which may exacerbate her GI issues.  MRI L-spine unremarkable.      ROS:  Gastrointestinal: +bowel  "incontinence  Musculoskeletal: +limb pain   Balance of systems is otherwise negative except as per HPI        Past Medical History:  -------------------------------------    Allergy    Anxiety    Depression     Allergies:  Review of patient's allergies indicates:   Allergen Reactions    Latex, natural rubber Rash     Pertinent Family History:  Family History   Problem Relation Name Age of Onset    Allergies Mother      No Known Problems Father      Asthma Brother Brother     Diabetes Paternal Uncle Pro     Cancer Paternal Uncle Pro     Scleroderma Maternal Grandmother Patricia     Early death Maternal Grandmother Patricia     Diabetes Maternal Grandfather Vamsi     Hashimoto's thyroiditis Other COUSIN     Diabetes Maternal Uncle Hector        Pertinent Social History:  Social History[1]    Medications:  Current Outpatient Medications   Medication Instructions    estradioL (ESTRACE) 1 g, Vaginal, Daily    mometasone (NASONEX) 50 mcg/actuation nasal spray 2 sprays, Nasal, 2 times daily    sertraline (ZOLOFT) 25 MG tablet TAKE 1 TABLET(25 MG) BY MOUTH DAILY        Objective:        General:  Well-appearing, well-nourished and in NAD  HEENT:  Normocephalic, atraumatic  Musculoskeletal:  Normal joints  Extremities:  No clubbing, cyanosis, or edema    Neurologic Exam:   Awake, alert, and oriented x3  Speech spontaneous and fluent, intact comprehension.   Adequate fund of knowledge, vocabulary.    Normal strength and gait        Pertinent lab results  Lab Results   Component Value Date    [id number] 631 10/27/2022     No results found for: "METHLYMALONI", "ARSENICBLD", "LEADBLOOD", "CADMIUM", "MERCURYBLOOD"  No results found for: "PATHINTPSPE", "PATHINTPSIF"  Lab Results   Component Value Date    ANAP1 Speckled 06/12/2025     Lab Results   Component Value Date    [id number] Negative 11/04/2019     Lab Results   Component Value Date    TSH 1.804 05/07/2025    FREET4 0.98 09/05/2019    WBC 11.60 05/07/2025    LYMPH 17.6 (L) " 05/07/2025    LYMPH 2.04 05/07/2025    RBC 4.83 05/07/2025    HGB 14.6 05/07/2025    HCT 45.8 05/07/2025    MCV 95 05/07/2025     05/07/2025     05/07/2025    K 4.0 05/07/2025    CO2 26 05/07/2025    BUN 9 05/07/2025    CREATININE 0.9 05/07/2025    CALCIUM 9.7 05/07/2025    AST 18 05/07/2025    ALT 16 05/07/2025            Assessment and Plan:    I   Ms. Morales is seen in follow up after MRI C- and L-spine and NCS/EMG.  Unremarkable findings on imaging and testing.  Symptoms suggestive of musculoskeletal overuse/orthopedic cause.  Symptoms which prompted her initial visit are much improved, and now she is having muscle aching left gluteal/hip area.  Bowel and bladder symptoms to be worked up by GI and urology/urogyn, respectively.       Considered potential causes for reported bladder/bowel symptoms, but found no clear neurological explanation based on imaging.   Explained that GI specialists are accustomed to addressing sensitive bowel-related issues.   Referred to GI specialist for evaluation of bowel symptoms.   Reviewed MRI cervical and lumbar spine, noting mild degenerative changes and disc bulging but no significant abnormalities requiring surgical intervention.   Symptoms likely manageable through physical therapy or pain management approaches.   Started Voltaren cream, apply as directed once or twice daily for about a week to assess effectiveness.   Follow up on August 4th as scheduled for orthopedics appointment.  Will defer to ortho for further treatment/rehab/PT.   Contact the office if any new symptoms or concerns arise.    PLAN SUMMARY:   Started Voltaren cream, apply once or twice daily for about a week   Referred to GI specialist for evaluation of bowel symptoms   Physical therapy or pain management approaches recommended for symptom management   Follow up on August 4th for scheduled orthopedics appointment   Contact office if new symptoms or concerns arise                  This is a  patient with a complex neurologic diagnosis whose overall, ongoing care is being managed and monitored by me and our Neurology clinic.   As such, since 2024,  is the appropriate add-on code to accompany the other E/M billing for this visit.    This note was generated with the assistance of ambient listening technology. Verbal consent was obtained by the patient and accompanying visitor(s) for the recording of patient appointment to facilitate this note. I attest to having reviewed and edited the generated note for accuracy, though some syntax or spelling errors may persist. Please contact the author of this note for any clarification.                     [1]   Social History  Tobacco Use    Smoking status: Never     Passive exposure: Past    Smokeless tobacco: Never   Substance Use Topics    Alcohol use: Yes     Alcohol/week: 1.0 standard drink of alcohol     Types: 1 Shots of liquor per week     Comment: social    Drug use: Never

## 2025-08-04 ENCOUNTER — HOSPITAL ENCOUNTER (OUTPATIENT)
Dept: RADIOLOGY | Facility: HOSPITAL | Age: 35
Discharge: HOME | End: 2025-08-04
Attending: PHYSICIAN ASSISTANT
Payer: COMMERCIAL

## 2025-08-04 ENCOUNTER — OFFICE VISIT (OUTPATIENT)
Dept: ORTHOPEDICS | Facility: CLINIC | Age: 35
End: 2025-08-04
Payer: COMMERCIAL

## 2025-08-04 DIAGNOSIS — M62.89 TENSOR FASCIA LATA SYNDROME: Primary | ICD-10-CM

## 2025-08-04 DIAGNOSIS — M25.552 PAIN OF LEFT HIP: ICD-10-CM

## 2025-08-04 PROCEDURE — 73502 X-RAY EXAM HIP UNI 2-3 VIEWS: CPT | Mod: TC,LT

## 2025-08-04 PROCEDURE — 73502 X-RAY EXAM HIP UNI 2-3 VIEWS: CPT | Mod: 26,LT,, | Performed by: RADIOLOGY

## 2025-08-04 PROCEDURE — 99214 OFFICE O/P EST MOD 30 MIN: CPT | Mod: S$GLB,,, | Performed by: PHYSICIAN ASSISTANT

## 2025-08-04 PROCEDURE — 99999 PR PBB SHADOW E&M-EST. PATIENT-LVL II: CPT | Mod: PBBFAC,,, | Performed by: PHYSICIAN ASSISTANT

## 2025-08-04 PROCEDURE — 3044F HG A1C LEVEL LT 7.0%: CPT | Mod: CPTII,S$GLB,, | Performed by: PHYSICIAN ASSISTANT

## 2025-08-04 RX ORDER — METHOCARBAMOL 750 MG/1
1500 TABLET, FILM COATED ORAL EVERY 8 HOURS PRN
Qty: 20 TABLET | Refills: 0 | Status: SHIPPED | OUTPATIENT
Start: 2025-08-04

## 2025-08-04 NOTE — PROGRESS NOTES
[facility]  Orthopedic Surgery  Clinic Note      Subjective:   History of Present Illness    CHIEF COMPLAINT:    I saw her on 5/21/2025 for L trapezius strain which improved.     Patient presents today for left hip pain.    HISTORY OF PRESENT ILLNESS:  She reports left hip pain present for more than one month that is worse in the morning and throughout the day. The pain initially started during physical therapy and has progressed from left shoulder to back and now involves the left hip. She was discharged from physical therapy as the current pain was deemed unrelated to the original shoulder issue. The pain fluctuates with periods of relative comfort interrupted by painful episodes that are severe enough to cause intermittent limping. She denies any specific injury or trauma causing the pain. The pain was potentially exacerbated by lifting her one-year-old niece over the weekend.    MEDICATIONS:  She has been taking Aleve (naproxen) as prescribed by her general doctor and took it twice yesterday for pain relief.      ROS:  Musculoskeletal: +joint pain, -muscle pain, +pain with movement, +back pain  Skin: -rash, -lesion  Neurological: -headache, -dizziness, -numbness, -tingling        Medications: I have reviewed medication list in the chart at the time of this encounter.     Review of patient's allergies indicates:   Allergen Reactions    Latex, natural rubber Rash      Past Medical History:   Diagnosis Date    Allergy     Anxiety     Depression      Past Surgical History:   Procedure Laterality Date    EYE SURGERY  I dont know    Lazy eye twice / last time was 4/5 grade    STRABISMUS SURGERY      x 2    [place] TOOTH EXTRACTION  2005       Objective:     Physical Exam  Musculoskeletal:      Right hip: No deformity, lacerations or bony tenderness. Normal range of motion. Normal strength.      Left hip: No deformity, lacerations or bony tenderness. Normal range of motion. Normal strength.          Right Ankle  Exam     Muscle Strength   Dorsiflexion:  5/5  Plantar flexion:  5/5       Left Ankle Exam     Muscle Strength   Dorsiflexion:  5/5   Plantar flexion:  5/5       Right Hip Exam     Tenderness   The patient is experiencing no tenderness.     Muscle Strength   Abduction: 5/5   Adduction: 5/5   Flexion: 5/5       Left Hip Exam     Tenderness   The patient is experiencing tenderness in the greater trochanter.    Muscle Strength   Abduction: 5/5   Adduction: 5/5   Flexion: 5/5     Comments:  Mild TTP at GTB and TFL muscle.      Back Exam     Muscle Strength   Right Quadriceps:  5/5   Left Quadriceps:  5/5   Right Hamstrings:  5/5   Left Hamstrings:  5/5            left hip:  normal gait.  100/130° flexion.  20/30° extension.   50/60° external rotation.  20/40° internal rotation.   50/50° abduction.  20/30° adduction.   Negative trendelenburg (hip abductor weakness) test.  Negative Shana test, but has pain over TFL muscle. Mild TTP at GTB.  Negative Randhawa test.  Negative Stinchfield's test at 20-30°.  Negative Rodrigo (hip extensor) test.  Negative Shira sign (Agus test).   Negative straight leg raise.  Negative FADDIR test.  Negative FAIR (piriformis syndrome) test.   Negative adductor squeeze test at 0, 45, and 90°.  Negative log roll (intraarticular) test.       Imaging:  I have independently interpreted and reviewed L hip XR with patient with patient.          Neurography & Electromyography Report         Full Name:      Safia Morales         Gender:          Female  Patient ID:      [id number]          YOB: 1990      Visit Date:      7/30/2025 8:58 AM  Age:    35 Years  Examining Physician:          Lolis Samano MD, FAAN   Referring Physician: Lolis Samano MD, FAAN   Technician:    XANDER Fair   Height:            5 feet 3 inch  Weight:           134 lbs        Safia Morales [id number] 7/30/2025 8:58 AM      1 of 1  Reason for Referral:  Left upper extremity pain             Safia  Carmen [id number] 7/30/2025 8:58 AM      1 of 1     History and Examination:    35-yr-old woman with symptoms that began 4/1/25.  Left upper extremity pain (shoulder/scapular area).   No numbness/tingling.  Symptoms have improved.    Summary:  Nerve conduction studies were performed on the bilateral median, ulnar, and radial nerves.  All nerve studies were normal.  EMG was performed on select muscles of the left upper extremity including the cervical paraspinals.  Normal insertional and recruitment activity of motor unit potentials was observed.       Interpretation:     Normal study of the upper extremities        Lolis Samano MD, FAAN  Neuromuscular Consultant  [facility]    MRI Cervical Spine Without Contrast  Order: [id number]   Status: Final result       Next appt: 08/06/2025 at 01:00 PM in Gastroenterology (Fernanda Reed NP)       Dx: Muscle weakness of left upper extremi...    Test Result Released: Yes (seen)    0 Result Notes       View Follow-Up Encounter  Details    Reading Physician Reading Date Result Priority   Chris Napier MD  [phone]  [phone]  6/27/2025 Routine     Narrative & Impression  EXAMINATION:  MRI CERVICAL SPINE WITHOUT CONTRAST     CLINICAL HISTORY:  Neck pain, chronic, degenerative changes on xray;Neck trauma, focal neuro deficit or paresthesia (Age 16-64y);.  Disease of spinal cord, unspecified     TECHNIQUE:  Multiplanar, multisequence MR images of the cervical spine were acquired without the administration of contrast.     COMPARISON:  Radiographs 05/07/2025     FINDINGS:  ALIGNMENT: Normal.     BONE: No compression fractures.  No marrow replacing lesions.     JOINT: Disc desiccation in the upper cervical spine.  No disc height loss.  Facet joints are unremarkable.  No bone marrow edema.     SPINAL CANAL: The cervical spinal cord is unremarkable.  No mass or collection.     POSTERIOR FOSSA: Unremarkable.     PARASPINAL SOFT TISSUES: Unremarkable.      SIGNIFICANT FINDINGS BY LEVEL: No focal disc abnormality, spinal canal stenosis, or neural foraminal stenosis at any level.     Impression:     Mild disc degeneration in the upper cervical spine.  No spinal canal or neural foraminal stenosis.        Electronically signed by:Chris Napier  Date:                                            06/27/2025  Time:                                           13:05        Exam Ended: 06/27/25 12:04 CDT Last Resulted: 06/27/25 13:05 CDT       MRI Lumbar Spine Without Contrast  Order: [id number]   Status: Final result       Next appt: 08/06/2025 at 01:00 PM in Gastroenterology (Fernanda Reed NP)       Dx: Lumbar radiculopathy, chronic    Test Result Released: Yes (seen)    0 Result Notes  Details    Reading Physician Reading Date Result Priority   Chris Napier MD  [phone]  [phone]  6/27/2025      Narrative & Impression  EXAMINATION:  MRI LUMBAR SPINE WITHOUT CONTRAST     CLINICAL HISTORY:  Lumbar radiculopathy, symptoms persist with conservative treatment;Can't sense urge to have bowel movement.  +Back pain, left > right; Radiculopathy, lumbar region     TECHNIQUE:  Multiplanar, multisequence MR images were acquired from the thoracolumbar junction to the sacrum without the administration of contrast.     COMPARISON:  Radiographs 05/21/2025     FINDINGS:  ALIGNMENT: Normal.     BONE: Bilateral L5 pars defects.  No compression fracture.  No marrow replacing lesions.     JOINT: Intervertebral discs are well-hydrated. No disc height loss. Facet joints are unremarkable.  No bone marrow edema.     SPINAL CANAL: The conus medullaris has a normal appearance and terminates at the L1 level.  Cauda equina nerve roots are unremarkable.  No mass or collection.     PARASPINAL SOFT TISSUES: 1.3 cm intramural fibroid in the posterior fundus.  Sigmoid diverticulosis.     SIGNIFICANT FINDINGS BY LEVEL: No focal disc abnormality, spinal canal stenosis, or neural foraminal stenosis at  any level.     Impression:     Bilateral L5 pars defects.  No spondylolisthesis.     No spinal canal or neural foraminal stenosis.     Other findings as described.        Electronically signed by:Chris Napier  Date:                                            06/27/2025  Time:                                           13:11        Exam Ended: 06/27/25 12:04 CDT Last Resulted: 06/27/25 13:11 CDT           Assessment:       1. Tensor fascia elisha syndrome    2. Pain of left hip       Plan:       Orders Placed This Encounter    X-Ray Hip 2 or 3 views Left with Pelvis when performed    Ambulatory Referral/Consult to Physical Therapy    methocarbamoL (ROBAXIN) 750 MG Tab        Assessment & Plan    IMPRESSION:  - Likely tensor fasciae latae syndrome of left hip. Could also be gluteal tendinitis at insertion at GTB. Treatment is the same.   - Treating conservatively with physical therapy and OTC anti-inflammatories.  - Previous trapezius pain resolved.  - Reviewed recent MRI C and L spine and EMG, all negative.    PATIENT EDUCATION:  - Discussed pathophysiology of tensor fasciae latae syndrome.    ACTION ITEMS/LIFESTYLE:  - Patient to avoid any activity that increases pain in the affected area.    MEDICATIONS:  - Started Robaxin for muscle spasms and tension as needed.  - Continue OTC anti-inflammatories: Ibuprofen 600 or 800 mg every 6 hours as needed for pain relief, OR Naproxen 500 mg every 12 hours as needed for pain relief. Take only 1 NSAID.    REFERRALS:  - Referred to physical therapy for tensor fasciae latae syndrome.    Follow up:  - Follow up with me in about 4-6 weeks or sooner if needed.          Reny Candelaria PA-C  Orthopedic Surgery  [facility]

## 2025-08-06 ENCOUNTER — OFFICE VISIT (OUTPATIENT)
Dept: GASTROENTEROLOGY | Facility: CLINIC | Age: 35
End: 2025-08-06
Payer: COMMERCIAL

## 2025-08-06 VITALS — WEIGHT: 130 LBS | BODY MASS INDEX: 23.04 KG/M2 | HEIGHT: 63 IN

## 2025-08-06 DIAGNOSIS — R19.8 ABNORMAL DEFECATION: Primary | ICD-10-CM

## 2025-08-06 PROCEDURE — 3044F HG A1C LEVEL LT 7.0%: CPT | Mod: CPTII,95,,

## 2025-08-06 PROCEDURE — 98002 SYNCH AUDIO-VIDEO NEW MOD 45: CPT | Mod: 95,,,

## 2025-08-06 PROCEDURE — 1159F MED LIST DOCD IN RCRD: CPT | Mod: CPTII,95,,

## 2025-08-06 NOTE — PROGRESS NOTES
"The patient location is: Work  The chief complaint leading to consultation is: no urge to defecate    Visit type: audiovisual    Face to Face time with patient: 8 mins  40 minutes of total time spent on the encounter, which includes face to face time and non-face to face time preparing to see the patient (eg, review of tests), Obtaining and/or reviewing separately obtained history, Documenting clinical information in the electronic or other health record, Independently interpreting results (not separately reported) and communicating results to the patient/family/caregiver, or Care coordination (not separately reported).     Each patient to whom he or she provides medical services by telemedicine is:  (1) informed of the relationship between the physician and patient and the respective role of any other health care provider with respect to management of the patient; and (2) notified that he or she may decline to receive medical services by telemedicine and may withdraw from such care at any time.      Gastroenterology Clinic Consultation Note    Patient ID: Safia Morales is a 35 y.o. female.    Chief Complaint: GI Problem (Change in bowel habits/)    History of Present Illness    CHIEF COMPLAINT:  Patient presents today for difficulty sensing bowel movements.    HISTORY OF PRESENT ILLNESS:  She reports difficulty with bowel movements characterized by lack of sensation to defecate for approximately two years. She has to sit on the toilet to have a bowel movement and lacks the physical sensation to initiate defecation, though she can hold bowel movements. She denies any bowel movement accidents, incontinence, or blood in stools. Her bowel movements vary in consistency, particularly when consuming lactose, but the underlying sensation issue has remained consistent. She describes this as her current "norm" and has not previously sought medical intervention for these symptoms.    MEDICAL HISTORY:  She has lactose " intolerance with GI symptoms occurring with excessive dairy consumption. She denies history of pregnancies and current pain. Previous neurological workup was normal with comprehensive spine evaluation showing no abnormalities.    FAMILY HISTORY:  She denies family history of colon, gastric, or esophageal cancers.      ROS:  General: -fever, -chills, -fatigue, -weight gain, -weight loss  Eyes: -vision changes, -redness, -discharge  ENT: -ear pain, -nasal congestion, -sore throat  Cardiovascular: -chest pain, -palpitations, -lower extremity edema  Respiratory: -cough, -shortness of breath  Gastrointestinal: -abdominal pain, -nausea, -vomiting, +diarrhea, -constipation, -blood in stool, +bowel incontinence  Genitourinary: -dysuria, -hematuria, -frequency  Musculoskeletal: -joint pain, -muscle pain  Skin: -rash, -lesion  Neurological: -headache, -dizziness, -numbness, -tingling  Psychiatric: -anxiety, -depression, -sleep difficulty         Physical Exam    General: No acute distress. Well-developed. Well-nourished.      Medical History:  has a past medical history of Allergy, Anxiety, and Depression.    Surgical History:  has a past surgical history that includes Strabismus surgery; [place] tooth extraction (2005); and Eye surgery (I dont know).    Family History: family history includes Allergies in her mother; Asthma in her brother; Cancer in her paternal uncle; Diabetes in her maternal grandfather, maternal uncle, and paternal uncle; Early death in her maternal grandmother; Hashimoto's thyroiditis in an other family member; No Known Problems in her father; Scleroderma in her maternal grandmother..       Review of patient's allergies indicates:   Allergen Reactions    Latex, natural rubber Rash       Medications Ordered Prior to Encounter[1]    Labs:  Lab Results   Component Value Date    WBC 11.60 05/07/2025    HGB 14.6 05/07/2025    HCT 45.8 05/07/2025     05/07/2025    CHOL 218 (H) 07/12/2024    TRIG 86  "07/12/2024    HDL 49 07/12/2024    ALKPHOS 70 05/07/2025    ALT 16 05/07/2025    AST 18 05/07/2025     05/07/2025    K 4.0 05/07/2025     05/07/2025    CREATININE 0.9 05/07/2025    BUN 9 05/07/2025    CO2 26 05/07/2025    TSH 1.804 05/07/2025    HGBA1C 5.1 05/07/2025       Vital Signs:  Ht 5' 3" (1.6 m)   Wt 59 kg (130 lb)   BMI 23.03 kg/m²   Body mass index is 23.03 kg/m².    Imaging reviewed: No pertinent imaging reviewed       Endoscopy reviewed: No prior endoscopy performed       Assessment:  1. Abnormal defecation      Orders Placed This Encounter    Ambulatory Referral/Consult to Physical Therapy       Assessment & Plan      CHANGE IN BOWEL HABIT:   Conservative approach preferred over surgical interventions like stimulators at this time, given no incontinence.   Consider pelvic floor therapy pending results of anal rectal manometry.   Discussed the role of pelvic floor muscles in defecation and flatulence.   Ordered anal rectal manometry to assess rectum function and guide potential pelvic floor therapy, including catheter insertion and balloon expulsion.   Started Metamucil (fiber supplement) to bulk up stools and potentially improve defecation sensation.    NEUROLOGICAL ASSESSMENT:   Neurologist workup showed no spinal issues.    FOLLOW-UP:   Follow up to sign consent form for anal rectal manometry on the 4th floor clinic.   Contact the office to schedule anal rectal manometry after signing consent.         No follow-ups on file.        TRAMAINE CHEN-C  Gastroenterology Department  [facility] [phone]      This note was generated with the assistance of ambient listening technology. Verbal consent was obtained by the patient and accompanying visitor(s) for the recording of patient appointment to facilitate this note. I attest to having reviewed and edited the generated note for accuracy, though some syntax or spelling errors may persist. Please contact " the author of this note for any clarification.                       [1]   Current Outpatient Medications on File Prior to Visit   Medication Sig Dispense Refill    estradioL (ESTRACE) 0.01 % (0.1 mg/gram) vaginal cream Place 1 g vaginally once daily. 42.5 g 3    methocarbamoL (ROBAXIN) 750 MG Tab Take 2 tablets (1,500 mg total) by mouth every 8 (eight) hours as needed (for muscle spasms or tension). 20 tablet 0    mometasone (NASONEX) 50 mcg/actuation nasal spray 2 sprays by Nasal route 2 (two) times daily. 2 each 5    sertraline (ZOLOFT) 25 MG tablet TAKE 1 TABLET(25 MG) BY MOUTH DAILY 90 tablet 1     No current facility-administered medications on file prior to visit.